# Patient Record
Sex: MALE | Race: WHITE | Employment: FULL TIME | URBAN - NONMETROPOLITAN AREA
[De-identification: names, ages, dates, MRNs, and addresses within clinical notes are randomized per-mention and may not be internally consistent; named-entity substitution may affect disease eponyms.]

---
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
## 2020-04-14 ENCOUNTER — OFFICE VISIT (OUTPATIENT)
Dept: PRIMARY CARE CLINIC | Age: 20
End: 2020-04-14
Payer: COMMERCIAL

## 2020-04-14 ENCOUNTER — HOSPITAL ENCOUNTER (OUTPATIENT)
Age: 20
Setting detail: SPECIMEN
Discharge: HOME | End: 2020-04-14
Payer: COMMERCIAL

## 2020-04-14 VITALS
HEART RATE: 93 BPM | BODY MASS INDEX: 30.54 KG/M2 | OXYGEN SATURATION: 100 % | SYSTOLIC BLOOD PRESSURE: 144 MMHG | TEMPERATURE: 98.2 F | RESPIRATION RATE: 18 BRPM | DIASTOLIC BLOOD PRESSURE: 91 MMHG | WEIGHT: 225.5 LBS | HEIGHT: 72 IN

## 2020-04-14 LAB — S PYO AG THROAT QL: NORMAL

## 2020-04-14 PROCEDURE — 87651 STREP A DNA AMP PROBE: CPT

## 2020-04-14 PROCEDURE — 99213 OFFICE O/P EST LOW 20 MIN: CPT | Performed by: NURSE PRACTITIONER

## 2020-04-14 PROCEDURE — 87880 STREP A ASSAY W/OPTIC: CPT | Performed by: NURSE PRACTITIONER

## 2020-04-14 PROCEDURE — U0002 COVID-19 LAB TEST NON-CDC: HCPCS

## 2020-04-14 NOTE — PATIENT INSTRUCTIONS
cause the common cold. They also cause more serious illnesses like [place] respiratory syndrome (MERS) and severe acute respiratory syndrome (SARS). COVID-19 is caused by a novel coronavirus. That means it's a new type that has not been seen in people before. This virus spreads person-to-person through droplets from coughing and sneezing. It can also spread when you are close to someone who is infected. And it can spread when you touch something that has the virus on it, such as a doorknob or a tabletop. What can you do to protect yourself from coronavirus (COVID-19)? The best way to protect yourself from getting sick is to:  · Avoid areas where there is an outbreak. · Avoid contact with people who may be infected. · Wash your hands often with soap or alcohol-based hand sanitizers. · Avoid crowds and try to stay at least 6 feet away from other people. · Wash your hands often, especially after you cough or sneeze. Use soap and water, and scrub for at least 20 seconds. If soap and water aren't available, use an alcohol-based hand . · Avoid touching your mouth, nose, and eyes. What can you do to avoid spreading the virus to others? To help avoid spreading the virus to others:  · Cover your mouth with a tissue when you cough or sneeze. Then throw the tissue in the trash. · Use a disinfectant to clean things that you touch often. · Stay home if you are sick or have been exposed to the virus. Don't go to school, work, or public areas. And don't use public transportation. · If you are sick:  ? Leave your home only if you need to get medical care. But call the doctor's office first so they know you're coming. And wear a face mask, if you have one.  ? If you have a face mask, wear it whenever you're around other people. It can help stop the spread of the virus when you cough or sneeze. ? Clean and disinfect your home every day. Use household  and disinfectant wipes or sprays.  Take special Instructions  Overview  The coronavirus disease (COVID-19) is caused by a virus. It causes a fever, a cough, and shortness of breath. It mainly spreads person-to-person through droplets from coughing and sneezing. The virus also can spread when people are in close contact with someone who is infected. Most people have mild symptoms and can take care of themselves at home. If their symptoms get worse, they may need care in a hospital. There is no medicine to fight the virus. It's important to not spread the virus to others. You need to isolate yourself while you are sick. Your doctor will tell you when you no longer need to be isolated. Leave your home only if you need to get medical care. Follow-up care is a key part of your treatment and safety. Be sure to make and go to all appointments, and call your doctor if you are having problems. It's also a good idea to know your test results and keep a list of the medicines you take. How can you care for yourself at home? · Get extra rest. It can help you feel better. · Drink plenty of fluids. This helps replace fluids lost from fever. Fluids also help ease a scratchy throat. Water, soup, fruit juice, and hot tea with lemon are good choices. · Take acetaminophen (such as Tylenol) to reduce a fever. It may also help with muscle aches. Read and follow all instructions on the label. · Sponge your body with lukewarm water to help with fever. Don't use cold water or ice. · Use petroleum jelly on sore skin. This can help if the skin around your nose and lips becomes sore from rubbing a lot with tissues. Tips for isolation  · Wear a face mask, if you have one, when you are around other people. It can help stop the spread of the virus when you cough or sneeze. · Limit contact with people in your home. If possible, stay in a separate bedroom and use a separate bathroom. · Avoid contact with pets and other animals.   · Cover your mouth and nose with a tissue when you

## 2020-04-14 NOTE — PROGRESS NOTES
Right cervical: Superficial cervical adenopathy present. No posterior cervical adenopathy. Left cervical: Superficial cervical adenopathy present. No posterior cervical adenopathy. Skin:     General: Skin is warm and dry. Coloration: Skin is not pale. Findings: No erythema or rash. Neurological:      Mental Status: He is alert and oriented to person, place, and time. Psychiatric:         Behavior: Behavior normal. Behavior is cooperative. Results for orders placed or performed in visit on 04/14/20   POCT rapid strep A   Result Value Ref Range    Strep A Ag None Detected None Detected   Centor Score:  +2, will send strep culture. With being symptomatic and recent exposure to possible COVID-19, will obtain COVID-19 test.      Assessment/Plan:  1. Suspected COVID-19 virus infection  - Covid-19 Ambulatory; Future    2. Sore throat  - POCT rapid strep A  - Strep A DNA probe, amplification; Future  - Covid-19 Ambulatory; Future    1. Suspected COVID 19 infection:  · Practice meticulous handwashing and cover cough to prevent spread of infection. · Advised to quarantine self at home until receives results from COVID-19 - will call with results. · Do not return to work or school until symptoms have resolved and no fever for 72 hrs without medication. · PUI (Person Under Investigation) form completed and sent to [facility]. · Get Well Loop program initiated and given Virtual Visit with ABBEY Jang on Thursday, April 16 at 11 AM.  · Encouraged to increase fluids and rest  · Tylenol OTC PRN for pain, discomfort or fever as directed on package  · Cool mist humidifier  · Hot tea with honey and lemon for cough PRN  · Patient instructions given for suspected Covid 19 infection. .  · To ER or call 911 if any increasing difficulty breathing, shortness of breath, inability to swallow, hives, rash, facial/tongue swelling or temp greater than 103 degrees.   · Follow up with

## 2020-04-15 LAB
DIRECT EXAM: NORMAL
Lab: NORMAL
SPECIMEN DESCRIPTION: NORMAL

## 2020-04-16 ENCOUNTER — TELEPHONE (OUTPATIENT)
Dept: PRIMARY CARE CLINIC | Age: 20
End: 2020-04-16

## 2020-04-16 ENCOUNTER — TELEMEDICINE (OUTPATIENT)
Dept: FAMILY MEDICINE CLINIC | Age: 20
End: 2020-04-16
Payer: COMMERCIAL

## 2020-04-16 LAB — SARS-COV-2, NAA: NOT DETECTED

## 2020-04-16 PROCEDURE — 99213 OFFICE O/P EST LOW 20 MIN: CPT | Performed by: NURSE PRACTITIONER

## 2020-04-16 ASSESSMENT — ENCOUNTER SYMPTOMS
NAUSEA: 1
VOMITING: 0
SHORTNESS OF BREATH: 0
DIARRHEA: 1
COUGH: 1

## 2020-04-16 NOTE — TELEPHONE ENCOUNTER
Phone call to patient regarding update on condition. He said he still feels about the same no worse. He did have VV with Neelam Cortés CNP today. He was instructed to report to ED if respiratory distress or change in condition.

## 2020-05-28 ENCOUNTER — OFFICE VISIT (OUTPATIENT)
Dept: FAMILY MEDICINE CLINIC | Age: 20
End: 2020-05-28
Payer: COMMERCIAL

## 2020-05-28 VITALS
HEART RATE: 110 BPM | TEMPERATURE: 98.7 F | WEIGHT: 225 LBS | DIASTOLIC BLOOD PRESSURE: 86 MMHG | BODY MASS INDEX: 30.52 KG/M2 | OXYGEN SATURATION: 99 % | SYSTOLIC BLOOD PRESSURE: 138 MMHG

## 2020-05-28 PROCEDURE — 99213 OFFICE O/P EST LOW 20 MIN: CPT | Performed by: NURSE PRACTITIONER

## 2020-05-28 ASSESSMENT — PATIENT HEALTH QUESTIONNAIRE - PHQ9
1. LITTLE INTEREST OR PLEASURE IN DOING THINGS: 0
SUM OF ALL RESPONSES TO PHQ QUESTIONS 1-9: 0
SUM OF ALL RESPONSES TO PHQ9 QUESTIONS 1 & 2: 0
SUM OF ALL RESPONSES TO PHQ QUESTIONS 1-9: 0
2. FEELING DOWN, DEPRESSED OR HOPELESS: 0

## 2020-05-28 ASSESSMENT — ENCOUNTER SYMPTOMS
NAUSEA: 0
DIARRHEA: 0
VOMITING: 0
SHORTNESS OF BREATH: 0
COUGH: 0

## 2020-05-28 NOTE — PROGRESS NOTES
HPI Notes    Name: Nirmala Montelongo  : 2000         Chief Complaint:     Chief Complaint   Patient presents with    Testicle Pain     Patient complains of right testicle pain, not sure if he feels a lump. Started a couple months ago. History of Present Illness:        Testicle Pain   This is a new problem. The current episode started more than 1 month ago (2 months ago). The problem occurs intermittently. The problem has been waxing and waning. Pertinent negatives include no chest pain, chills, coughing, fever, headaches, nausea or vomiting. Associated symptoms comments: Tenderness in right testicle only with palpation. Nothing aggravates the symptoms. He has tried nothing for the symptoms. Past Medical History:     No past medical history on file. Reviewed all health maintenance requirements and ordered appropriate tests  Health Maintenance Due   Topic Date Due    HPV vaccine (1 - Male 2-dose series) 2011    HIV screen  2015       Past Surgical History:     Past Surgical History:   Procedure Laterality Date    TONSILLECTOMY          Medications:       Prior to Admission medications    Not on File        Allergies:       Patient has no known allergies. Social History:     Tobacco:    reports that he has never smoked. He has never used smokeless tobacco.  Alcohol:      reports no history of alcohol use. Drug Use:  reports no history of drug use. Family History:      No family history on file. Review of Systems:         Review of Systems   Constitutional: Negative for chills and fever. Respiratory: Negative for cough and shortness of breath. Cardiovascular: Negative for chest pain and palpitations. Gastrointestinal: Negative for diarrhea, nausea and vomiting. Genitourinary: Positive for testicular pain (with palpation). Negative for discharge, dysuria, flank pain, frequency, genital sores, penile pain, penile swelling, scrotal swelling and urgency. Neurological: Negative for dizziness, seizures and headaches. Physical Exam:     Vitals:  /86   Pulse 110   Temp 98.7 °F (37.1 °C) (Oral)   Wt 225 lb (102.1 kg)   SpO2 99%   BMI 30.52 kg/m²       Physical Exam  Vitals signs and nursing note reviewed. Constitutional:       Appearance: He is well-developed. Cardiovascular:      Rate and Rhythm: Normal rate and regular rhythm. Heart sounds: S1 normal and S2 normal.   Pulmonary:      Effort: Pulmonary effort is normal. No respiratory distress. Breath sounds: Normal breath sounds. Abdominal:      General: Bowel sounds are normal.      Palpations: Abdomen is soft. Tenderness: There is no abdominal tenderness. Genitourinary:     Penis: Normal and circumcised. Scrotum/Testes:         Right: Tenderness present. Mass, swelling or testicular hydrocele not present. Epididymis:      Right: Tenderness present. Comments: Mild tenderness to the epididymal araya on right. Neg phren's sign. Skin:     General: Skin is warm and dry. Psychiatric:         Behavior: Behavior is cooperative.                Data:     Lab Results   Component Value Date     04/17/2017    K 4.0 04/17/2017     04/17/2017    CO2 26 04/17/2017    BUN 10 04/17/2017    CREATININE 1.05 04/17/2017    GLUCOSE 100 04/17/2017    GLUCOSE 93 11/25/2011    PROT 7.8 04/17/2017    LABALBU 4.5 04/17/2017    BILITOT 0.37 04/17/2017    ALKPHOS 202 04/17/2017    AST 23 04/17/2017    ALT 22 04/17/2017     Lab Results   Component Value Date    WBC 6.7 04/17/2017    RBC 5.24 04/17/2017    RBC 4.52 11/25/2011    HGB 15.0 04/17/2017    HCT 44.3 04/17/2017    MCV 84.5 04/17/2017    MCH 28.5 04/17/2017    MCHC 33.8 04/17/2017    RDW 13.1 04/17/2017     04/17/2017     11/25/2011    MPV NOT REPORTED 04/17/2017     Lab Results   Component Value Date    TSH 1.66 04/17/2017     No results found for: CHOL, HDL, PSA, LABA1C       Assessment & Plan

## 2021-03-25 ENCOUNTER — OFFICE VISIT (OUTPATIENT)
Dept: FAMILY MEDICINE CLINIC | Age: 21
End: 2021-03-25
Payer: COMMERCIAL

## 2021-03-25 VITALS
OXYGEN SATURATION: 99 % | BODY MASS INDEX: 32.69 KG/M2 | SYSTOLIC BLOOD PRESSURE: 130 MMHG | HEART RATE: 84 BPM | DIASTOLIC BLOOD PRESSURE: 80 MMHG | WEIGHT: 241 LBS | TEMPERATURE: 97.6 F

## 2021-03-25 DIAGNOSIS — F41.9 ANXIETY: ICD-10-CM

## 2021-03-25 DIAGNOSIS — R45.89 DEPRESSED MOOD: Primary | ICD-10-CM

## 2021-03-25 DIAGNOSIS — F33.1 MODERATE EPISODE OF RECURRENT MAJOR DEPRESSIVE DISORDER (HCC): ICD-10-CM

## 2021-03-25 PROCEDURE — 99214 OFFICE O/P EST MOD 30 MIN: CPT | Performed by: STUDENT IN AN ORGANIZED HEALTH CARE EDUCATION/TRAINING PROGRAM

## 2021-03-25 RX ORDER — FLUOXETINE HYDROCHLORIDE 20 MG/1
CAPSULE ORAL
Qty: 30 CAPSULE | Refills: 0 | Status: SHIPPED | OUTPATIENT
Start: 2021-03-25 | End: 2021-11-16 | Stop reason: SDUPTHER

## 2021-03-25 ASSESSMENT — PATIENT HEALTH QUESTIONNAIRE - PHQ9
SUM OF ALL RESPONSES TO PHQ QUESTIONS 1-9: 17
7. TROUBLE CONCENTRATING ON THINGS, SUCH AS READING THE NEWSPAPER OR WATCHING TELEVISION: 1
6. FEELING BAD ABOUT YOURSELF - OR THAT YOU ARE A FAILURE OR HAVE LET YOURSELF OR YOUR FAMILY DOWN: 1
SUM OF ALL RESPONSES TO PHQ9 QUESTIONS 1 & 2: 5
4. FEELING TIRED OR HAVING LITTLE ENERGY: 2
3. TROUBLE FALLING OR STAYING ASLEEP: 2
10. IF YOU CHECKED OFF ANY PROBLEMS, HOW DIFFICULT HAVE THESE PROBLEMS MADE IT FOR YOU TO DO YOUR WORK, TAKE CARE OF THINGS AT HOME, OR GET ALONG WITH OTHER PEOPLE: 3
1. LITTLE INTEREST OR PLEASURE IN DOING THINGS: 3
9. THOUGHTS THAT YOU WOULD BE BETTER OFF DEAD, OR OF HURTING YOURSELF: 1

## 2021-03-25 ASSESSMENT — COLUMBIA-SUICIDE SEVERITY RATING SCALE - C-SSRS
2. HAVE YOU ACTUALLY HAD ANY THOUGHTS OF KILLING YOURSELF?: NO
1. WITHIN THE PAST MONTH, HAVE YOU WISHED YOU WERE DEAD OR WISHED YOU COULD GO TO SLEEP AND NOT WAKE UP?: YES
6. HAVE YOU EVER DONE ANYTHING, STARTED TO DO ANYTHING, OR PREPARED TO DO ANYTHING TO END YOUR LIFE?: NO

## 2021-03-25 ASSESSMENT — ENCOUNTER SYMPTOMS
ABDOMINAL PAIN: 0
DIARRHEA: 0
WHEEZING: 0
SINUS PAIN: 0
VOMITING: 0
NAUSEA: 0
SORE THROAT: 0
COUGH: 0

## 2021-03-25 NOTE — PROGRESS NOTES
HPI Notes    Name: Catherine [place]  : 2000         Chief Complaint:     Chief Complaint   Patient presents with   [address]     Patient is here for Anxiety and Depression. States the Anxiety has been going on for about a year and Depression has been going on for awhile off and on. History of Present Illness:      HPI    Is a 25-year-old man with no known past medical history presenting to discuss mental health question with me. States he would like to discuss his depressed mood, as well as recent anxiety. He states that his anxiety has been ongoing for over 12 months, and his mood has been depressed \"on and off\" for at least that long, but over the past three months in particular. He is unable to recall any inciting event, but does have a history of familial discord including two divorces in his parents. He states he is able to concentrate at work and complete necessary tasks, but states that outside of work he is noticing several problems with function, endorsing low appetite, decreased sleep d/t difficulty, self isolation, anhedonia. He has been experiencing psychomotor retardation as well, and has had passive suicidal ideation without a plan. He has been smoking cannabis, 2 bongs/day over the past few months as depressed mood has worsened, was less frequent before that. He has never been treated for MDD before. Past Medical History:     No past medical history on file. Reviewed all health maintenance requirements and ordered appropriate tests  Health Maintenance Due   Topic Date Due    Hepatitis C screen  Never done    HPV vaccine (1 - Male 2-dose series) Never done    HIV screen  Never done    COVID-19 Vaccine (1) Never done       Past Surgical History:     Past Surgical History:   Procedure Laterality Date    TONSILLECTOMY          Medications:       Prior to Admission medications    Not on File        Allergies:       Patient has no known allergies.     Social History: Tobacco:    reports that he has never smoked. He has never used smokeless tobacco.  Alcohol:      reports no history of alcohol use. Drug Use:  reports no history of drug use. Family History:     No family history on file. Review of Systems:         Review of Systems   Constitutional: Negative for fever. HENT: Negative for sinus pain, sneezing and sore throat. Respiratory: Negative for cough and wheezing. Cardiovascular: Negative for chest pain. Gastrointestinal: Negative for abdominal pain, diarrhea, nausea and vomiting. Psychiatric/Behavioral: Positive for behavioral problems, decreased concentration, dysphoric mood and suicidal ideas. Negative for agitation, hallucinations, self-injury and sleep disturbance. The patient is nervous/anxious. The patient is not hyperactive. Physical Exam:     Vitals: Wt 241 lb (109.3 kg)   BMI 32.69 kg/m²       Physical Exam  Vitals signs and nursing note reviewed. Constitutional:       General: He is not in acute distress. Appearance: Normal appearance. He is normal weight. Musculoskeletal: Normal range of motion. General: No swelling or tenderness. Skin:     General: Skin is warm and dry. Capillary Refill: Capillary refill takes less than 2 seconds. Neurological:      General: No focal deficit present. Mental Status: He is alert and oriented to person, place, and time. Mental status is at baseline. Psychiatric:         Attention and Perception: Attention normal.         Mood and Affect: Mood is depressed. Speech: Speech normal.         Behavior: Behavior normal. Behavior is cooperative. Thought Content: Thought content normal.         Cognition and Memory: Cognition and memory normal.      Comments: Good insight. Consistently demonstrating future oriented thought.                  Data:     Lab Results   Component Value Date     04/17/2017    K 4.0 04/17/2017     04/17/2017    CO2 26 04/17/2017    BUN 10 04/17/2017    CREATININE 1.05 04/17/2017    GLUCOSE 100 04/17/2017    GLUCOSE 93 11/25/2011    PROT 7.8 04/17/2017    LABALBU 4.5 04/17/2017    BILITOT 0.37 04/17/2017    ALKPHOS 202 04/17/2017    AST 23 04/17/2017    ALT 22 04/17/2017     Lab Results   Component Value Date    WBC 6.7 04/17/2017    RBC 5.24 04/17/2017    RBC 4.52 11/25/2011    HGB 15.0 04/17/2017    HCT 44.3 04/17/2017    MCV 84.5 04/17/2017    MCH 28.5 04/17/2017    MCHC 33.8 04/17/2017    RDW 13.1 04/17/2017     04/17/2017     11/25/2011    MPV NOT REPORTED 04/17/2017     Lab Results   Component Value Date    TSH 1.66 04/17/2017     No results found for: CHOL, HDL, PSA, LABA1C       Assessment & Plan        Diagnosis Orders   1. Depressed mood     2. Anxiety         1. Based on history, interview he certainly does meet DSM-V criteria for major depressive disorder. He is also experiencing panic attacks and emotional dysregulation. I would recommend treatment for 6 to 12 months with fluoxetine. The patient I had a long discussion about starting fluoxetine. We discussed its mechanism of action, intended goals, adverse effects, as well as common side effects. They were able to verbalize understanding, and repeat plan back to me. I would also recommend he undergo counseling, and cognitive behavioral therapy specifically. I have referred him to family life counseling in [facility]. He states he is not particularly [employer]an, thus I see no need to recommend involving his mary group in treatment. Follow-up in 2 weeks            Completed Refills   Requested Prescriptions      No prescriptions requested or ordered in this encounter     No follow-ups on file. No orders of the defined types were placed in this encounter. No orders of the defined types were placed in this encounter. Patient Instructions     SURVEY:    You may be receiving a survey from [employer] regarding your visit today.     Please

## 2021-03-25 NOTE — PATIENT INSTRUCTIONS
Chance,   I believe that you have Major depression. I'd recommend treatment with counseling (cognitive behavioral therapy) and generic Prozac (fluoxetine). Take 1 prozac capsule daily for 2 weeks, then go to 2 capsules daily after that. Come back and see me in 2 weeks. Total treatment time is usually 6-12 months. You've shown a lot of fortitude coming here and you should be proud. Dr. Dea Auguste:    Alexyserwinmurray Arboleda may be receiving a survey from [employer] regarding your visit today. Please complete the survey to enable us to provide the highest quality of care to you and your family. If you cannot score us a very good on any question, please call the office to discuss how we could of made your experience a very good one. Thank you. Patient Education        Recovering From Depression: Care Instructions  Your Care Instructions     Taking good care of yourself is important as you recover from depression. In time, your symptoms will fade as your treatment takes hold. Do not give up. Instead, focus your energy on getting better. Your mood will improve. It just takes some time. Focus on things that can help you feel better, such as being with friends and family, eating well, and getting enough rest. But take things slowly. Do not do too much too soon. You will begin to feel better gradually. Follow-up care is a key part of your treatment and safety. Be sure to make and go to all appointments, and call your doctor if you are having problems. It's also a good idea to know your test results and keep a list of the medicines you take. How can you care for yourself at home? Be realistic  · If you have a large task to do, break it up into smaller steps you can handle, and just do what you can. · You may want to put off important decisions until your depression has lifted. If you have plans that will have a major impact on your life, such as marriage, divorce, or a job change, try to wait a bit.  Talk it over with effects from your medicine, tell your doctor. Many side effects are mild and will go away on their own after you have been taking the medicine for a few weeks. Some may last longer. Talk to your doctor if side effects are bothering you too much. You might be able to try a different medicine. · Continue counseling. It may help prevent depression from returning, especially if you've had multiple episodes of depression. Talk with your counselor if you are having a hard time attending your sessions or you think the sessions aren't working. Don't just stop going. · Get enough sleep. Talk to your doctor if you are having problems sleeping. · Avoid sleeping pills unless they are prescribed by the doctor treating your depression. Sleeping pills may make you groggy during the day, and they may interact with other medicine you are taking. · If you have any other illnesses, such as diabetes, heart disease, or high blood pressure, make sure to continue with your treatment. Tell your doctor about all of the medicines you take, including those with or without a prescription. · If you or someone you know talks about suicide, self-harm, or feeling hopeless, get help right away. Call the [address] [place] [address] at 1-800-273-talk (1-112.144.9288) or text HOME to 218833 to access the Crisis Text Line. Consider saving these numbers in your phone. When should you call for help? Call 455 anytime you think you may need emergency care. For example, call if:    · You feel like hurting yourself or someone else.     · Someone you know has depression and is about to attempt or is attempting suicide. Call your doctor now or seek immediate medical care if:    · You hear voices.     · Someone you know has depression and:  ? Starts to give away his or her possessions. ? Uses illegal drugs or drinks alcohol heavily. ? Talks or writes about death, including writing suicide notes or talking about guns, knives, or pills.   ? Starts to spend a lot of time alone. ? Acts very aggressively or suddenly appears calm. Watch closely for changes in your health, and be sure to contact your doctor if:    · You do not get better as expected. Where can you learn more? Go to https://chloliseb.PedidosYa / PedidosJÃ¡. org and sign in to your [employer] account. Enter V223 in the Kybernesis box to learn more about \"Recovering From Depression: Care Instructions. \"     If you do not have an account, please click on the \"Sign Up Now\" link. Current as of: September 23, 2020               Content Version: 12.8  © 8742-7458 Healthwise, Incorporated. Care instructions adapted under license by [facility] ([facility]). If you have questions about a medical condition or this instruction, always ask your healthcare professional. [address] any warranty or liability for your use of this information.

## 2021-04-08 ENCOUNTER — OFFICE VISIT (OUTPATIENT)
Dept: FAMILY MEDICINE CLINIC | Age: 21
End: 2021-04-08
Payer: COMMERCIAL

## 2021-04-08 VITALS
OXYGEN SATURATION: 99 % | SYSTOLIC BLOOD PRESSURE: 138 MMHG | TEMPERATURE: 97.2 F | DIASTOLIC BLOOD PRESSURE: 88 MMHG | BODY MASS INDEX: 31.87 KG/M2 | HEART RATE: 104 BPM | WEIGHT: 235 LBS

## 2021-04-08 DIAGNOSIS — G43.109 MIGRAINE WITH AURA AND WITHOUT STATUS MIGRAINOSUS, NOT INTRACTABLE: ICD-10-CM

## 2021-04-08 DIAGNOSIS — F32.1 CURRENT MODERATE EPISODE OF MAJOR DEPRESSIVE DISORDER WITHOUT PRIOR EPISODE (HCC): Primary | ICD-10-CM

## 2021-04-08 PROBLEM — F32.9 CURRENT EPISODE OF MAJOR DEPRESSIVE DISORDER WITHOUT PRIOR EPISODE: Status: ACTIVE | Noted: 2021-04-08

## 2021-04-08 PROCEDURE — 99214 OFFICE O/P EST MOD 30 MIN: CPT | Performed by: STUDENT IN AN ORGANIZED HEALTH CARE EDUCATION/TRAINING PROGRAM

## 2021-04-08 RX ORDER — NAPROXEN SODIUM 550 MG/1
TABLET ORAL
Qty: 60 TABLET | Refills: 3 | Status: SHIPPED | OUTPATIENT
Start: 2021-04-08 | End: 2021-09-10

## 2021-04-08 RX ORDER — FLUOXETINE HYDROCHLORIDE 40 MG/1
40 CAPSULE ORAL DAILY
Qty: 90 CAPSULE | Refills: 1 | Status: SHIPPED | OUTPATIENT
Start: 2021-04-08 | End: 2021-05-20 | Stop reason: SDUPTHER

## 2021-04-08 ASSESSMENT — ENCOUNTER SYMPTOMS
SINUS PAIN: 0
SORE THROAT: 0
COUGH: 0
VOMITING: 0
BACK PAIN: 0
DIARRHEA: 0
WHEEZING: 0
ABDOMINAL PAIN: 0
NAUSEA: 0
PHOTOPHOBIA: 1

## 2021-04-08 NOTE — PATIENT INSTRUCTIONS
on low to relax tight shoulder and neck muscles. · Have someone gently massage your neck and shoulders. · Take your medicines exactly as prescribed. Call your doctor if you think you are having a problem with your medicine. You will get more details on the specific medicines your doctor prescribes. · Don't take medicine for headache pain too often. Talk to your doctor if you are taking medicine more than 2 days a week to stop a headache. Taking too much pain medicine can lead to more headaches. These are called medicine-overuse headaches. To prevent migraines  · Keep a headache diary so you can figure out what triggers your headaches. Avoiding triggers may help you prevent headaches. Record when each headache began, how long it lasted, and what the pain was like. Write down any other symptoms you had with the headache, such as nausea, flashing lights or dark spots, or sensitivity to bright light or loud noise. Note if the headache occurred near your period. List anything that might have triggered the headache. Triggers may include certain foods (chocolate, cheese, wine) or odors, smoke, bright light, stress, or lack of sleep. · If your doctor has prescribed medicine for your migraines, take it as directed. You may have medicine that you take only when you get a migraine and medicine that you take all the time to help prevent migraines. ? If your doctor has prescribed medicine for when you get a headache, take it at the first sign of a migraine, unless your doctor has given you other instructions. ? If your doctor has prescribed medicine to prevent migraines, take it exactly as prescribed. Call your doctor if you think you are having a problem with your medicine. · Find healthy ways to deal with stress. Migraines are most common during or right after stressful times. Try finding ways to reduce stress like practicing mindfulness or deep breathing exercises. · Get plenty of sleep and exercise.  But be careful to not push yourself too hard during exercise. It may trigger a headache. · Eat meals on a regular schedule. Avoid foods and drinks that often trigger migraines. These include chocolate, alcohol (especially red wine and port), aspartame, monosodium glutamate (MSG), and some additives found in foods (such as hot dogs, aguiar, cold cuts, aged cheeses, and pickled foods). · Limit caffeine. Don't drink too much coffee, tea, or soda. But don't quit caffeine suddenly. That can also give you migraines. · Do not smoke or allow others to smoke around you. If you need help quitting, talk to your doctor about stop-smoking programs and medicines. These can increase your chances of quitting for good. · If you are taking birth control pills or hormone therapy, talk to your doctor about whether they are triggering your migraines. When should you call for help? Call 911 anytime you think you may need emergency care. For example, call if:    · You have signs of a stroke. These may include:  ? Sudden numbness, paralysis, or weakness in your face, arm, or leg, especially on only one side of your body. ? Sudden vision changes. ? Sudden trouble speaking. ? Sudden confusion or trouble understanding simple statements. ? Sudden problems with walking or balance. ? A sudden, severe headache that is different from past headaches. Call your doctor now or seek immediate medical care if:    · You have new or worse nausea and vomiting.     · You have a new or higher fever.     · Your headache gets much worse. Watch closely for changes in your health, and be sure to contact your doctor if:    · You are not getting better after 2 days (48 hours). Where can you learn more? Go to https://Jeds Barbeque and Brewlucina.Bespoke. org and sign in to your [employer] account. Enter Z888 in the Meituan.com box to learn more about \"Migraine Headache: Care Instructions. \"     If you do not have an account, please click on the \"Sign Up Now\" link.  Current as of: August 4, 2020               Content Version: 12.8  © 3038-0013 [employer], Incorporated. Care instructions adapted under license by [facility] ([facility]). If you have questions about a medical condition or this instruction, always ask your healthcare professional. [address] any warranty or liability for your use of this information.

## 2021-04-08 NOTE — PROGRESS NOTES
HPI Notes    Name: Lamont Lima  : 2000         Chief Complaint:     Chief Complaint   Patient presents with   [address]     Patient is here for follow up for depression. Started on Prozac and  hasn't seen a change.  Dizziness     Started about a month ago with lightheadedness and blurred vision.  Blurred Vision       History of Present Illness:        HPI    Major Depressive disorder: has been \"forcing himself to go to the gymn, spend time with family\". No change with passive suicidality, mood still lower, sleep is same as is appetite. Has not noticed any bothersome side effects from the Prozac. Has contacted counseling, looking to get set with an appointment. Endorses occasional lightheadedness with blurry vision, endorses pounding unihemispheric headache centered behind right eye with photo/phonophobia. No nausea. Does have blurry vision in context of headache. Dizziness has increased with frequency of headaches. Is reporting about 3 headaches per week. Past Medical History:     No past medical history on file. Reviewed all health maintenance requirements and ordered appropriate tests  Health Maintenance Due   Topic Date Due    Hepatitis C screen  Never done    HPV vaccine (1 - Male 2-dose series) Never done    HIV screen  Never done    COVID-19 Vaccine (1) Never done       Past Surgical History:     Past Surgical History:   Procedure Laterality Date    TONSILLECTOMY          Medications:       Prior to Admission medications    Medication Sig Start Date End Date Taking? Authorizing Provider   naproxen sodium (ANAPROX) 550 MG tablet Take 1100mg PO at onset of headache, may take 550mg one hour later if headache persists 21  Yes Arnie Claire DO   FLUoxetine (PROZAC) 40 MG capsule Take 1 capsule by mouth daily 21  Yes Arnie Claire DO        Allergies:       Patient has no known allergies.     Social History:     Tobacco:    reports that he has never smoked. He has never used smokeless tobacco.  Alcohol:      reports no history of alcohol use. Drug Use:  reports no history of drug use. Family History:     No family history on file. Review of Systems:         Review of Systems   Constitutional: Negative for fever. HENT: Negative for sinus pain, sneezing and sore throat. Eyes: Positive for photophobia and visual disturbance. Respiratory: Negative for cough and wheezing. Cardiovascular: Negative for chest pain. Gastrointestinal: Negative for abdominal pain, diarrhea, nausea and vomiting. Musculoskeletal: Negative for back pain. Skin: Negative for rash. Neurological: Positive for dizziness, light-headedness and headaches. Negative for facial asymmetry, weakness and numbness. Hematological: Negative for adenopathy. Psychiatric/Behavioral: Positive for sleep disturbance and suicidal ideas. Negative for decreased concentration. Depressed mood           Physical Exam:     Vitals:  /88   Pulse 104   Temp 97.2 °F (36.2 °C) (Temporal)   Wt 235 lb (106.6 kg)   SpO2 99%   BMI 31.87 kg/m²       Physical Exam  Vitals signs and nursing note reviewed. Constitutional:       General: He is not in acute distress. Appearance: Normal appearance. He is normal weight. Cardiovascular:      Rate and Rhythm: Normal rate and regular rhythm. Pulses: Normal pulses. Heart sounds: Normal heart sounds. No murmur. Pulmonary:      Effort: Pulmonary effort is normal. No respiratory distress. Breath sounds: Normal breath sounds. Musculoskeletal: Normal range of motion. General: No swelling or tenderness. Skin:     General: Skin is warm and dry. Capillary Refill: Capillary refill takes less than 2 seconds. Neurological:      General: No focal deficit present. Mental Status: He is alert and oriented to person, place, and time. Mental status is at baseline.    Psychiatric:         Mood and Affect: Mood normal.         Behavior: Behavior normal.         Thought Content: Thought content normal.                 Data:     Lab Results   Component Value Date     04/17/2017    K 4.0 04/17/2017     04/17/2017    CO2 26 04/17/2017    BUN 10 04/17/2017    CREATININE 1.05 04/17/2017    GLUCOSE 100 04/17/2017    GLUCOSE 93 11/25/2011    PROT 7.8 04/17/2017    LABALBU 4.5 04/17/2017    BILITOT 0.37 04/17/2017    ALKPHOS 202 04/17/2017    AST 23 04/17/2017    ALT 22 04/17/2017     Lab Results   Component Value Date    WBC 6.7 04/17/2017    RBC 5.24 04/17/2017    RBC 4.52 11/25/2011    HGB 15.0 04/17/2017    HCT 44.3 04/17/2017    MCV 84.5 04/17/2017    MCH 28.5 04/17/2017    MCHC 33.8 04/17/2017    RDW 13.1 04/17/2017     04/17/2017     11/25/2011    MPV NOT REPORTED 04/17/2017     Lab Results   Component Value Date    TSH 1.66 04/17/2017     No results found for: CHOL, HDL, PSA, LABA1C       Assessment & Plan        Diagnosis Orders   1. Current moderate episode of major depressive disorder without prior episode (HCC)  FLUoxetine (PROZAC) 40 MG capsule   2. Migraine with aura and without status migrainosus, not intractable  naproxen sodium (ANAPROX) 550 MG tablet       1. Increase to 40mg fluoxetine PO qD. Reevaluate in 6 weeks  2. Will use abortive therapy with naproxen 550mg. 1100mg PO at onset of headache followed by 550mg one hour later if needed. Completed Refills   Requested Prescriptions     Signed Prescriptions Disp Refills    naproxen sodium (ANAPROX) 550 MG tablet 60 tablet 3     Sig: Take 1100mg PO at onset of headache, may take 550mg one hour later if headache persists    FLUoxetine (PROZAC) 40 MG capsule 90 capsule 1     Sig: Take 1 capsule by mouth daily     Return in about 6 weeks (around 5/20/2021) for Mental Health, MDD.   Orders Placed This Encounter   Medications    naproxen sodium (ANAPROX) 550 MG tablet     Sig: Take 1100mg PO at onset of headache, may take 550mg one hour later if headache persists     Dispense:  60 tablet     Refill:  3    FLUoxetine (PROZAC) 40 MG capsule     Sig: Take 1 capsule by mouth daily     Dispense:  90 capsule     Refill:  1     No orders of the defined types were placed in this encounter. Patient Instructions   Chance,  Start taking two 20mg Prozac capsules/day for total of 40mg once per day. We'll reevaluate you in 6 weeks for this problem. Please keep your appointment with counseling. Based on your history, you seem to have migraine headaches. Call me if you get more than 3 per week. We need to use abortive therapy with naproxen. Take 1100mg naproxen once with a coffee or tea then you may take an additional 550mg 1 hour later if the headache is still there. Dr. Tia Castro:    Sherly Islas may be receiving a survey from "[employer] regarding your visit today. Please complete the survey to enable us to provide the highest quality of care to you and your family. If you cannot score us a very good on any question, please call the office to discuss how we could of made your experience a very good one. Thank you. Electronically signed by Prosper Breen DO on 4/8/2021 at 9:31 AM           Completed Refills   Requested Prescriptions     Signed Prescriptions Disp Refills    naproxen sodium (ANAPROX) 550 MG tablet 60 tablet 3     Sig: Take 1100mg PO at onset of headache, may take 550mg one hour later if headache persists    FLUoxetine (PROZAC) 40 MG capsule 90 capsule 1     Sig: Take 1 capsule by mouth daily         Chance received counseling on the following healthy behaviors: medication adherence  Reviewed prior labs and health maintenance. Continue current medications, diet and exercise. Discussed use, benefit, and side effects of prescribed medications. Barriers to medication compliance addressed. Patient given educational materials - see patient instructions. All patient questions answered.   Patient voiced

## 2021-05-20 ENCOUNTER — OFFICE VISIT (OUTPATIENT)
Dept: FAMILY MEDICINE CLINIC | Age: 21
End: 2021-05-20
Payer: COMMERCIAL

## 2021-05-20 VITALS
OXYGEN SATURATION: 100 % | HEIGHT: 72 IN | WEIGHT: 229 LBS | BODY MASS INDEX: 31.02 KG/M2 | HEART RATE: 64 BPM | DIASTOLIC BLOOD PRESSURE: 80 MMHG | TEMPERATURE: 97 F | SYSTOLIC BLOOD PRESSURE: 120 MMHG

## 2021-05-20 DIAGNOSIS — F41.9 ANXIETY: ICD-10-CM

## 2021-05-20 DIAGNOSIS — F51.02 ADJUSTMENT INSOMNIA: ICD-10-CM

## 2021-05-20 DIAGNOSIS — F32.1 CURRENT MODERATE EPISODE OF MAJOR DEPRESSIVE DISORDER WITHOUT PRIOR EPISODE (HCC): Primary | ICD-10-CM

## 2021-05-20 DIAGNOSIS — Z76.0 MEDICATION REFILL: ICD-10-CM

## 2021-05-20 PROCEDURE — 99214 OFFICE O/P EST MOD 30 MIN: CPT | Performed by: STUDENT IN AN ORGANIZED HEALTH CARE EDUCATION/TRAINING PROGRAM

## 2021-05-20 RX ORDER — FLUOXETINE HYDROCHLORIDE 40 MG/1
40 CAPSULE ORAL DAILY
Qty: 90 CAPSULE | Refills: 1 | Status: SHIPPED | OUTPATIENT
Start: 2021-05-20 | End: 2021-06-14 | Stop reason: SDUPTHER

## 2021-05-20 RX ORDER — HYDROXYZINE HYDROCHLORIDE 25 MG/1
25 TABLET, FILM COATED ORAL 4 TIMES DAILY PRN
Qty: 120 TABLET | Refills: 5 | Status: SHIPPED | OUTPATIENT
Start: 2021-05-20 | End: 2021-05-30

## 2021-05-20 SDOH — ECONOMIC STABILITY: FOOD INSECURITY: WITHIN THE PAST 12 MONTHS, YOU WORRIED THAT YOUR FOOD WOULD RUN OUT BEFORE YOU GOT MONEY TO BUY MORE.: NEVER TRUE

## 2021-05-20 ASSESSMENT — ENCOUNTER SYMPTOMS
VOMITING: 0
WHEEZING: 0
NAUSEA: 0
BACK PAIN: 0
SORE THROAT: 0
ABDOMINAL PAIN: 0
SINUS PAIN: 0
COUGH: 0
DIARRHEA: 0

## 2021-05-20 NOTE — PATIENT INSTRUCTIONS
Chance,  Use hydroxyzine one tablet up to four times a day as needed for symptoms of anxiety. For using it as a sleep aid, take two tablets one hour before bedtime. Dr. Almanzar Memos:    Tressa Sanders may be receiving a survey from [employer] regarding your visit today. Please complete the survey to enable us to provide the highest quality of care to you and your family. If you cannot score us a very good on any question, please call the office to discuss how we could of made your experience a very good one. Thank you.       Clinical Care Team:     Dr. Nitin Hunt, [facility]      ClericalTeam:     Florinda Poe     [address]

## 2021-05-20 NOTE — PROGRESS NOTES
HPI Notes    Name: Jorge Cam  : 2000         Chief Complaint:     Chief Complaint   Patient presents with   [address] Problem     Patient is here for follow up on Depression. he is currently taking Prozac 40mg and doing good.  Medication Refill     Needs prozac refilled. History of Present Illness:        HPI    This is a pleasant 26-year-old man presenting for interval follow-up for treatment of major depression. He is currently treated with Prozac 40 mg p.o. daily. He reports he is \"doing well\". He is reporting improved functionality, has less \"in bed days\" and is going to the gym more, reports increased ease of being social, interacting with the public and family. He is reporting improved relationships with his immediate family, and less agitation as well. He is still reporting insomnia, both difficulty falling asleep and difficulty staying asleep. He is still reporting difficulty with appetite and ordinarily eats once per day. He feels his depression has improved but he is still having \"anxiety symptoms\", particularly cold sweats, frequent need to defecate. He frequently feels a headache behind his right eye when this happens. This does not happen during his work, as \"I am the boss, I know I am the boss, and my anxiety has no place in that situation\". He reports he is more symptomatic during social events, and during his recreational time. Past Medical History:     No past medical history on file. Reviewed all health maintenance requirements and ordered appropriate tests  Health Maintenance Due   Topic Date Due    Hepatitis C screen  Never done    HPV vaccine (1 - Male 2-dose series) Never done    COVID-19 Vaccine (1) Never done    HIV screen  Never done       Past Surgical History:     Past Surgical History:   Procedure Laterality Date    TONSILLECTOMY          Medications:       Prior to Admission medications    Medication Sig Start Date End Date Taking?  Authorizing Provider   FLUoxetine (PROZAC) 40 MG capsule Take 1 capsule by mouth daily 5/20/21  Yes Leeanna Raw, DO   hydrOXYzine (ATARAX) 25 MG tablet Take 1 tablet by mouth 4 times daily as needed for Anxiety 5/20/21 5/30/21 Yes Leeanna Raw, DO   naproxen sodium (ANAPROX) 550 MG tablet Take 1100mg PO at onset of headache, may take 550mg one hour later if headache persists 4/8/21  Yes Leeanna Raw, DO        Allergies:       Patient has no known allergies. Social History:     Tobacco:    reports that he has never smoked. He has never used smokeless tobacco.  Alcohol:      reports no history of alcohol use. Drug Use:  reports no history of drug use. Family History:     No family history on file. Review of Systems:         Review of Systems   Constitutional: Positive for appetite change. Negative for fever. HENT: Negative for sinus pain, sneezing and sore throat. Respiratory: Negative for cough and wheezing. Cardiovascular: Negative for chest pain. Gastrointestinal: Negative for abdominal pain, diarrhea, nausea and vomiting. Musculoskeletal: Negative for back pain. Skin: Negative for rash. Hematological: Negative for adenopathy. Psychiatric/Behavioral: Positive for sleep disturbance. Negative for agitation, behavioral problems, dysphoric mood, hallucinations and suicidal ideas. The patient is nervous/anxious. Physical Exam:     Vitals:  /80   Pulse 64   Temp 97 °F (36.1 °C) (Temporal)   Ht 6' (1.829 m)   Wt 229 lb (103.9 kg)   SpO2 100%   BMI 31.06 kg/m²       Physical Exam  Vitals and nursing note reviewed. Constitutional:       General: He is not in acute distress. Appearance: Normal appearance. Musculoskeletal:         General: No swelling or tenderness. Normal range of motion. Skin:     General: Skin is warm and dry. Capillary Refill: Capillary refill takes less than 2 seconds. Neurological:      General: No focal deficit present. mechanism of action, intended goals, adverse effects, as well as common side effects. They were able to verbalize understanding, and repeat plan back to me. Overall, treatment is progressing well, he is noticing improvement in personal and social functioning. Would recommend he be reevaluated in office in 3 months            Completed Refills   Requested Prescriptions     Signed Prescriptions Disp Refills    FLUoxetine (PROZAC) 40 MG capsule 90 capsule 1     Sig: Take 1 capsule by mouth daily    hydrOXYzine (ATARAX) 25 MG tablet 120 tablet 5     Sig: Take 1 tablet by mouth 4 times daily as needed for Anxiety     Return in about 3 months (around 8/20/2021) for Mental Health--MDD, insomnia. Orders Placed This Encounter   Medications    FLUoxetine (PROZAC) 40 MG capsule     Sig: Take 1 capsule by mouth daily     Dispense:  90 capsule     Refill:  1    hydrOXYzine (ATARAX) 25 MG tablet     Sig: Take 1 tablet by mouth 4 times daily as needed for Anxiety     Dispense:  120 tablet     Refill:  5     No orders of the defined types were placed in this encounter. Patient Instructions   Chance,  Use hydroxyzine one tablet up to four times a day as needed for symptoms of anxiety. For using it as a sleep aid, take two tablets one hour before bedtime. Dr. Oksana David:    Mark Wilkins may be receiving a survey from [employer] regarding your visit today. Please complete the survey to enable us to provide the highest quality of care to you and your family. If you cannot score us a very good on any question, please call the office to discuss how we could of made your experience a very good one. Thank you.       Clinical Care Team:     Dr. Alton Villalpando, [facility]      ClericalTeam:     Tom Sebastian        Electronically signed by Janet Savage DO on 5/20/2021 at 9:19 AM           Completed Refills   Requested Prescriptions     Signed Prescriptions Disp Refills   

## 2021-05-27 ENCOUNTER — OFFICE VISIT (OUTPATIENT)
Dept: FAMILY MEDICINE CLINIC | Age: 21
End: 2021-05-27
Payer: COMMERCIAL

## 2021-05-27 ENCOUNTER — HOSPITAL ENCOUNTER (OUTPATIENT)
Age: 21
Discharge: HOME | End: 2021-05-27
Payer: COMMERCIAL

## 2021-05-27 VITALS
BODY MASS INDEX: 30.92 KG/M2 | DIASTOLIC BLOOD PRESSURE: 80 MMHG | SYSTOLIC BLOOD PRESSURE: 122 MMHG | TEMPERATURE: 97.1 F | WEIGHT: 228 LBS | HEART RATE: 88 BPM | OXYGEN SATURATION: 100 %

## 2021-05-27 DIAGNOSIS — F41.0 PANIC ATTACKS: Primary | ICD-10-CM

## 2021-05-27 DIAGNOSIS — K59.1 FUNCTIONAL DIARRHEA: ICD-10-CM

## 2021-05-27 DIAGNOSIS — F41.9 ANXIETY: ICD-10-CM

## 2021-05-27 LAB — TSH SERPL DL<=0.05 MIU/L-ACNC: 0.81 MIU/L (ref 0.3–5)

## 2021-05-27 PROCEDURE — 36415 COLL VENOUS BLD VENIPUNCTURE: CPT

## 2021-05-27 PROCEDURE — 84443 ASSAY THYROID STIM HORMONE: CPT

## 2021-05-27 PROCEDURE — 99214 OFFICE O/P EST MOD 30 MIN: CPT | Performed by: STUDENT IN AN ORGANIZED HEALTH CARE EDUCATION/TRAINING PROGRAM

## 2021-05-27 RX ORDER — ALPRAZOLAM 0.25 MG/1
0.25 TABLET ORAL 3 TIMES DAILY PRN
Qty: 20 TABLET | Refills: 0 | Status: SHIPPED | OUTPATIENT
Start: 2021-05-27 | End: 2021-06-26

## 2021-05-27 NOTE — PATIENT INSTRUCTIONS
SURVEY:    You may be receiving a survey from [employer] regarding your visit today. Please complete the survey to enable us to provide the highest quality of care to you and your family. If you cannot score us a very good on any question, please call the office to discuss how we could of made your experience a very good one. Thank you.       Clinical Care Team:     Dr. Radha Ayers, [facility]      ClericalTeam:     Teodoro Leong     [facility] [address]

## 2021-05-27 NOTE — PROGRESS NOTES
HPI Notes    Name: Johan Cruz  : 2000         Chief Complaint:     Chief Complaint   Patient presents with    Abdominal Pain     Patient is here for Vomiting, diarrhea and stomach pain.  Emesis     Started saturday     Diarrhea     Been going on for weeks.  Anxiety     Patient is having increased anxiety which is making the diarrhea and vomiting worse. History of Present Illness:      HPI    Panic attack this morning so bad he had to leave work. He reported symptoms started after considering life stressors, vomiting in the bathroom, then felt light headed, chest tightness, dizzy, overwhelming sense of panic. He tried using hydroxyzine this morning, but it did not help. He is reporting that the hydroxyzine is helpful for \"the smaller panic attacks\". He state he has had three panic attacks severe enough that the hydroxyzine did not help. The vomiting is a new feature of his panic attacks. He additionally has a complaint of loose stools for several weeks, usually 2-3 times/day. Indicated type 6, 7 on [place] stool chart. Also endorses midline cramping abdominal pain occasionally relieved by defecating. This is worse when he leaves the house, he never has symptoms at home. He denies melena, hematochezia, fevers, chills, recent travel, new foods. Or food triggers. Past Medical History:     Past Medical History:   Diagnosis Date    Panic attacks 2021      Reviewed all health maintenance requirements and ordered appropriate tests  Health Maintenance Due   Topic Date Due    Hepatitis C screen  Never done    HPV vaccine (1 - Male 2-dose series) Never done    COVID-19 Vaccine (1) Never done    HIV screen  Never done       Past Surgical History:     Past Surgical History:   Procedure Laterality Date    TONSILLECTOMY          Medications:       Prior to Admission medications    Medication Sig Start Date End Date Taking?  Authorizing Provider   ALPRAZolam Francisco Overall) 0.25 MG tablet Take 1 tablet by mouth 3 times daily as needed for Anxiety for up to 30 days. 5/27/21 6/26/21 Yes Leon Pena,    FLUoxetine (PROZAC) 40 MG capsule Take 1 capsule by mouth daily 5/20/21  Yes Leon Pena DO   hydrOXYzine (ATARAX) 25 MG tablet Take 1 tablet by mouth 4 times daily as needed for Anxiety 5/20/21 5/30/21 Yes Jluis Parada, DO   naproxen sodium (ANAPROX) 550 MG tablet Take 1100mg PO at onset of headache, may take 550mg one hour later if headache persists 4/8/21  Yes Marzachery Favors, DO        Allergies:       Patient has no known allergies. Social History:     Tobacco:    reports that he has never smoked. He has never used smokeless tobacco.  Alcohol:      reports no history of alcohol use. Drug Use:  reports no history of drug use. Family History:     No family history on file. Review of Systems:         Review of Systems   Constitutional: Negative for fever. HENT: Negative for sinus pain, sneezing and sore throat. Respiratory: Negative for cough and wheezing. Cardiovascular: Negative for chest pain. Gastrointestinal: Positive for abdominal pain and diarrhea. Negative for nausea and vomiting. Genitourinary: Negative for difficulty urinating, discharge and dysuria. Musculoskeletal: Negative for back pain. Skin: Negative for rash. Hematological: Negative for adenopathy. Psychiatric/Behavioral: Positive for behavioral problems. Negative for sleep disturbance. The patient is nervous/anxious. Physical Exam:     Vitals:  /80   Pulse 88   Temp 97.1 °F (36.2 °C) (Temporal)   Wt 228 lb (103.4 kg)   SpO2 100%   BMI 30.92 kg/m²       Physical Exam  Vitals and nursing note reviewed. Constitutional:       General: He is not in acute distress. Appearance: Normal appearance. Musculoskeletal:         General: No swelling or tenderness. Normal range of motion. Skin:     General: Skin is warm and dry.       Capillary Refill: Capillary refill takes of action, intended goals, adverse effects, as well as common side effects. They were able to verbalize understanding, and repeat plan back to me. We also discussed that this was a controlled medication and that limited quantities would be prescribed. 3.  Based on history, I am strongly suspicious of diarrhea predominant irritable bowel syndrome, or the very least functional constipation. I believe would be beneficial to exclude organic causes of repeated loose stools, diarrhea in the treatment of Xifaxan if work-up suggests irritable bowel syndrome. Follow-up as needed before next appointment. Completed Refills   Requested Prescriptions     Signed Prescriptions Disp Refills    ALPRAZolam (XANAX) 0.25 MG tablet 20 tablet 0     Sig: Take 1 tablet by mouth 3 times daily as needed for Anxiety for up to 30 days. Return if symptoms worsen or fail to improve. Orders Placed This Encounter   Medications    ALPRAZolam (XANAX) 0.25 MG tablet     Sig: Take 1 tablet by mouth 3 times daily as needed for Anxiety for up to 30 days. Dispense:  20 tablet     Refill:  0     Orders Placed This Encounter   Procedures    Giardia Antigen     Standing Status:   Future     Number of Occurrences:   1     Standing Expiration Date:   5/27/2022    Gastrointestinal Panel, Molecular     Standing Status:   Future     Number of Occurrences:   1     Standing Expiration Date:   5/27/2022    Blood Occult Stool #1     Standing Status:   Future     Number of Occurrences:   1     Standing Expiration Date:   5/27/2022    TSH With Reflex Ft4     Standing Status:   Future     Number of Occurrences:   1     Standing Expiration Date:   5/27/2022    Fecal lactoferrin     Standing Status:   Future     Number of Occurrences:   1     Standing Expiration Date:   5/27/2022         Patient Instructions     SURVEY:    You may be receiving a survey from [employer] regarding your visit today.     Please complete the survey to

## 2021-05-28 ENCOUNTER — HOSPITAL ENCOUNTER (OUTPATIENT)
Age: 21
Setting detail: SPECIMEN
Discharge: HOME | End: 2021-05-28
Payer: COMMERCIAL

## 2021-05-28 DIAGNOSIS — K59.1 FUNCTIONAL DIARRHEA: ICD-10-CM

## 2021-05-28 LAB
DATE, STOOL #1: NORMAL
DATE, STOOL #2: NORMAL
DATE, STOOL #3: NORMAL
HEMOCCULT SP1 STL QL: NEGATIVE
HEMOCCULT SP2 STL QL: NORMAL
HEMOCCULT SP3 STL QL: NORMAL
TIME, STOOL #1: 1920
TIME, STOOL #2: NORMAL
TIME, STOOL #3: NORMAL

## 2021-05-28 PROCEDURE — 83630 LACTOFERRIN FECAL (QUAL): CPT

## 2021-05-28 PROCEDURE — 87506 IADNA-DNA/RNA PROBE TQ 6-11: CPT

## 2021-05-28 PROCEDURE — 82272 OCCULT BLD FECES 1-3 TESTS: CPT

## 2021-05-28 PROCEDURE — 87329 GIARDIA AG IA: CPT

## 2021-05-28 ASSESSMENT — ENCOUNTER SYMPTOMS
DIARRHEA: 1
COUGH: 0
WHEEZING: 0
ABDOMINAL PAIN: 1
SORE THROAT: 0
SINUS PAIN: 0
BACK PAIN: 0
VOMITING: 0
NAUSEA: 0

## 2021-05-29 LAB
CAMPYLOBACTER PCR: NORMAL
E COLI ENTEROTOXIGENIC PCR: NORMAL
LACTOFERRIN, QUAL: NORMAL
PLESIOMONAS SHIGELLOIDES PCR: NORMAL
SALMONELLA PCR: NORMAL
SHIGATOXIN GENE PCR: NORMAL
SHIGELLA SP PCR: NORMAL
SPECIMEN DESCRIPTION: NORMAL
VIBRIO PCR: NORMAL
YERSINIA ENTEROCOLITICA PCR: NORMAL

## 2021-06-01 DIAGNOSIS — K59.1 FUNCTIONAL DIARRHEA: Primary | ICD-10-CM

## 2021-06-01 DIAGNOSIS — K58.0 IRRITABLE BOWEL SYNDROME WITH DIARRHEA: ICD-10-CM

## 2021-06-01 LAB
DIRECT EXAM: NORMAL
Lab: NORMAL
SPECIMEN DESCRIPTION: NORMAL

## 2021-06-14 DIAGNOSIS — Z76.0 MEDICATION REFILL: ICD-10-CM

## 2021-06-14 DIAGNOSIS — F32.1 CURRENT MODERATE EPISODE OF MAJOR DEPRESSIVE DISORDER WITHOUT PRIOR EPISODE (HCC): ICD-10-CM

## 2021-06-15 RX ORDER — FLUOXETINE HYDROCHLORIDE 40 MG/1
40 CAPSULE ORAL DAILY
Qty: 90 CAPSULE | Refills: 1 | Status: SHIPPED | OUTPATIENT
Start: 2021-06-15 | End: 2021-09-10

## 2021-09-04 ENCOUNTER — HOSPITAL ENCOUNTER (EMERGENCY)
Age: 21
Discharge: HOME | End: 2021-09-04
Attending: FAMILY MEDICINE
Payer: COMMERCIAL

## 2021-09-04 ENCOUNTER — APPOINTMENT (OUTPATIENT)
Dept: GENERAL RADIOLOGY | Age: 21
End: 2021-09-04
Payer: COMMERCIAL

## 2021-09-04 VITALS
TEMPERATURE: 98.2 F | WEIGHT: 234.1 LBS | OXYGEN SATURATION: 98 % | SYSTOLIC BLOOD PRESSURE: 144 MMHG | RESPIRATION RATE: 18 BRPM | DIASTOLIC BLOOD PRESSURE: 91 MMHG | HEART RATE: 100 BPM | BODY MASS INDEX: 31.75 KG/M2

## 2021-09-04 DIAGNOSIS — J40 BRONCHITIS: Primary | ICD-10-CM

## 2021-09-04 LAB
SARS-COV-2, RAPID: NOT DETECTED
SPECIMEN DESCRIPTION: NORMAL

## 2021-09-04 PROCEDURE — 6360000002 HC RX W HCPCS: Performed by: FAMILY MEDICINE

## 2021-09-04 PROCEDURE — 99284 EMERGENCY DEPT VISIT MOD MDM: CPT

## 2021-09-04 PROCEDURE — C9803 HOPD COVID-19 SPEC COLLECT: HCPCS

## 2021-09-04 PROCEDURE — 94664 DEMO&/EVAL PT USE INHALER: CPT

## 2021-09-04 PROCEDURE — 87635 SARS-COV-2 COVID-19 AMP PRB: CPT

## 2021-09-04 PROCEDURE — 71046 X-RAY EXAM CHEST 2 VIEWS: CPT

## 2021-09-04 RX ORDER — ALBUTEROL SULFATE 2.5 MG/3ML
2.5 SOLUTION RESPIRATORY (INHALATION) ONCE
Status: DISCONTINUED | OUTPATIENT
Start: 2021-09-04 | End: 2021-09-04

## 2021-09-04 RX ORDER — DEXAMETHASONE 4 MG/1
4 TABLET ORAL
Qty: 10 TABLET | Refills: 0 | Status: SHIPPED | OUTPATIENT
Start: 2021-09-04 | End: 2021-09-13

## 2021-09-04 RX ORDER — LEVALBUTEROL INHALATION SOLUTION 0.63 MG/3ML
0.63 SOLUTION RESPIRATORY (INHALATION) ONCE
Status: COMPLETED | OUTPATIENT
Start: 2021-09-04 | End: 2021-09-04

## 2021-09-04 RX ORDER — PROMETHAZINE HYDROCHLORIDE AND CODEINE PHOSPHATE 6.25; 1 MG/5ML; MG/5ML
5 SOLUTION ORAL EVERY 4 HOURS PRN
Qty: 120 ML | Refills: 0 | Status: SHIPPED | OUTPATIENT
Start: 2021-09-04 | End: 2021-09-09

## 2021-09-04 RX ORDER — DEXAMETHASONE 4 MG/1
6 TABLET ORAL ONCE
Status: COMPLETED | OUTPATIENT
Start: 2021-09-04 | End: 2021-09-04

## 2021-09-04 RX ADMIN — DEXAMETHASONE 6 MG: 4 TABLET ORAL at 06:37

## 2021-09-04 RX ADMIN — LEVALBUTEROL 0.63 MG: 0.63 SOLUTION RESPIRATORY (INHALATION) at 05:58

## 2021-09-04 ASSESSMENT — PAIN SCALES - GENERAL: PAINLEVEL_OUTOF10: 3

## 2021-09-04 ASSESSMENT — PAIN DESCRIPTION - LOCATION: LOCATION: GENERALIZED

## 2021-09-04 ASSESSMENT — PAIN DESCRIPTION - DESCRIPTORS: DESCRIPTORS: ACHING

## 2021-09-04 ASSESSMENT — PAIN DESCRIPTION - PAIN TYPE: TYPE: ACUTE PAIN

## 2021-09-04 NOTE — ED PROVIDER NOTES
eMERGENCY dEPARTMENT eNCOUnter        [address] [facility]    Chief Complaint   Patient presents with    Concern For [id number]     patient states on Thursday he developed fever, cough, body ache and chest tightness after coughing. Not vaccinated       HPI    Lorne Gunn is a 24 y.o. male who presents with cough and fever since Thursday. He has had body aches. Concerned about Covid type infection. His girlfriend had Covid test yesterday which was negative. His cough is nonproductive. It causes significant tightness in his chest.  He uses DayQuil and NyQuil without resolution of this problem. No hemoptysis. His mother does smoke. REVIEW OF SYSTEMS    Const: Fever and chills. No loss of taste or smell  GI: No abdominal pain or diarrhea. No nausea or vomiting. : No urinary difficulty or flank pain. CV: Chest discomfort relates mostly to breathing difficulty. Derm: No skin rashes or itching. No hive formation. Neuro: Feels fatigued. No syncope. Occasional headache. No paresthesias or dysesthesias. All body systems reviewed and otherwise negative    PAST MEDICAL HISTORY    Past Medical History:   Diagnosis Date    Panic attacks 5/27/2021       SURGICAL HISTORY    Past Surgical History:   Procedure Laterality Date    TONSILLECTOMY         CURRENT MEDICATIONS    Current Outpatient Rx   Medication Sig Dispense Refill    dexamethasone (DECADRON) 4 MG tablet Take 1 tablet by mouth daily (with breakfast) for 10 days For lung inflammation 10 tablet 0    promethazine-codeine (PHENERGAN WITH CODEINE) 6.25-10 MG/5ML SOLN solution Take 5 mLs by mouth every 4 hours as needed for Cough for up to 5 days. 120 mL 0    FLUoxetine (PROZAC) 40 MG capsule Take 1 capsule by mouth daily 90 capsule 1    naproxen sodium (ANAPROX) 550 MG tablet Take 1100mg PO at onset of headache, may take 550mg one hour later if headache persists 60 tablet 3       ALLERGIES    No Known Allergies    FAMILY HISTORY    History reviewed. No pertinent family history. SOCIAL HISTORY    Social History     Socioeconomic History    Marital status: Single     Spouse name: None    Number of children: None    Years of education: None    Highest education level: None   Occupational History    None   Tobacco Use    Smoking status: Never Smoker    Smokeless tobacco: Never Used   Vaping Use    Vaping Use: Every day    Substances: Never   Substance and Sexual Activity    Alcohol use: No    Drug use: No    Sexual activity: None   Other Topics Concern    None   Social History Narrative    None     Social Determinants of Health     Financial Resource Strain: Low Risk     Difficulty of Paying Living Expenses: Not hard at all   Food Insecurity: No Food Insecurity    Worried About Running Out of Food in the Last Year: Never true    Latoya of Food in the Last Year: Never true   Transportation Needs:     Lack of Transportation (Medical):  Lack of Transportation (Non-Medical):    Physical Activity:     Days of Exercise per Week:     Minutes of Exercise per Session:    Stress:     Feeling of Stress :    Social Connections:     Frequency of Communication with Friends and Family:     Frequency of Social Gatherings with Friends and Family:     Attends [employer] Services:     Active Member of Clubs or Organizations:     Attends Club or Organization Meetings:     Marital Status:    Intimate Partner Violence:     Fear of Current or Ex-Partner:     Emotionally Abused:     Physically Abused:     Sexually Abused:        PHYSICAL EXAM    VITAL SIGNS: BP (!) 144/91   Pulse 100   Temp 98.2 °F (36.8 °C) (Oral)   Resp 18   Wt 234 lb 1.6 oz (106.2 kg)   SpO2 98%   BMI 31.75 kg/m²   Constitutional:  Well developed, well nourished, 49-year-old male with harsh cough and body aches, no acute distress, non-toxic appearance   Eyes: PERRL, conjunctiva normal, sclera white  HENT: Head is normocephalic and no trauma. EACs are patent.   Nares patent with no epistaxis or rhinorrhea. Oropharynx shows tongue and uvula normal size. Posterior erythema but no exudate. The neck is supple and moved well. Trachea is midline. No thyroid tenderness. Respiratory: Harsh cough with expiratory wheeze. No stridor. Cardiovascular:  Normal rate, normal rhythm, no murmurs, no gallops, no rubs   Abdomen is soft and nontender  Musculoskeletal:  No edema or tenderness of the extremities. The patient shows controlled coordinated movements of his extremities. His gait is normal.  Spine is grossly aligned and nontender. Integument:  Well hydrated, no rash     RADIOLOGY/PROCEDURES    Chest x-ray is unremarkable    Covid PCR test is negative  ED COURSE & MEDICAL DECISION MAKING    Pertinent Labs & Imaging studies reviewed. (See chart for details)    Summation      Patient Course: 80-year-old male with cough and chest discomfort is evaluated. Harsh nonproductive cough and tachycardia. Improved breathing after Xopenex treatment. His heart rate does drop also. His chest x-ray is unremarkable. His Covid PCR test is negative. He will be treated for bronchitis and prescriptions for dexamethasone and Phenergan with codeine. Findings are discussed with the patient. He is significantly improved after aerosol treatment. Continue treatment at home. Follow-up with PCP stable at discharge. ED Medications administered this visit:    Medications   dexamethasone (DECADRON) tablet 6 mg (has no administration in time range)   levalbuterol (XOPENEX) nebulization 0.63 mg (0.63 mg Nebulization Given 9/4/21 0558)       New Prescriptions from this visit:    New Prescriptions    DEXAMETHASONE (DECADRON) 4 MG TABLET    Take 1 tablet by mouth daily (with breakfast) for 10 days For lung inflammation    PROMETHAZINE-CODEINE (PHENERGAN WITH CODEINE) 6.25-10 MG/5ML SOLN SOLUTION    Take 5 mLs by mouth every 4 hours as needed for Cough for up to 5 days.        Follow-up:  Rachelle Casiano   [address] [address]  [phone]    Schedule an appointment as soon as possible for a visit in 3 days  If symptoms worsen        Final Impression:   1.  Bronchitis               (Please note that portions of this note were completed with a voice recognition program.  Efforts were made to edit the dictations but occasionally words are mis-transcribed.)        Matthew Palacios DO  09/14/21 0002

## 2021-09-10 ENCOUNTER — HOSPITAL ENCOUNTER (EMERGENCY)
Age: 21
Discharge: HOME | DRG: 872 | End: 2021-09-10
Attending: INTERNAL MEDICINE
Payer: COMMERCIAL

## 2021-09-10 ENCOUNTER — APPOINTMENT (OUTPATIENT)
Dept: GENERAL RADIOLOGY | Age: 21
DRG: 872 | End: 2021-09-10
Payer: COMMERCIAL

## 2021-09-10 ENCOUNTER — APPOINTMENT (OUTPATIENT)
Dept: CT IMAGING | Age: 21
DRG: 872 | End: 2021-09-10
Payer: COMMERCIAL

## 2021-09-10 VITALS
SYSTOLIC BLOOD PRESSURE: 126 MMHG | HEIGHT: 72 IN | WEIGHT: 242.9 LBS | RESPIRATION RATE: 22 BRPM | OXYGEN SATURATION: 97 % | TEMPERATURE: 98.4 F | BODY MASS INDEX: 32.9 KG/M2 | DIASTOLIC BLOOD PRESSURE: 75 MMHG | HEART RATE: 80 BPM

## 2021-09-10 DIAGNOSIS — M02.9: ICD-10-CM

## 2021-09-10 DIAGNOSIS — M25.50 POLYARTHRALGIA: Primary | ICD-10-CM

## 2021-09-10 LAB
ABSOLUTE EOS #: 0.2 K/UL (ref 0–0.4)
ABSOLUTE IMMATURE GRANULOCYTE: ABNORMAL K/UL (ref 0–0.3)
ABSOLUTE LYMPH #: 4.8 K/UL (ref 1–4.8)
ABSOLUTE MONO #: 0.8 K/UL (ref 0–1)
ALBUMIN SERPL-MCNC: 4.1 G/DL (ref 3.5–5.2)
ALBUMIN/GLOBULIN RATIO: ABNORMAL (ref 1–2.5)
ALP BLD-CCNC: 116 U/L (ref 40–129)
ALT SERPL-CCNC: 37 U/L (ref 5–41)
ANION GAP SERPL CALCULATED.3IONS-SCNC: 11 MMOL/L (ref 9–17)
AST SERPL-CCNC: 26 U/L
BASOPHILS # BLD: 3 % (ref 0–2)
BASOPHILS ABSOLUTE: 0.5 K/UL (ref 0–0.2)
BILIRUB SERPL-MCNC: 0.24 MG/DL (ref 0.3–1.2)
BILIRUBIN URINE: NEGATIVE
BUN BLDV-MCNC: 14 MG/DL (ref 6–20)
BUN/CREAT BLD: 12 (ref 9–20)
CALCIUM SERPL-MCNC: 9.7 MG/DL (ref 8.6–10.4)
CHLORIDE BLD-SCNC: 102 MMOL/L (ref 98–107)
CO2: 28 MMOL/L (ref 20–31)
COLOR: YELLOW
COMMENT UA: NORMAL
CREAT SERPL-MCNC: 1.13 MG/DL (ref 0.7–1.2)
D-DIMER QUANTITATIVE: <0.27 MG/L FEU (ref 0–0.59)
DIFFERENTIAL TYPE: YES
DIRECT EXAM: NORMAL
EOSINOPHILS RELATIVE PERCENT: 1 % (ref 0–5)
GFR AFRICAN AMERICAN: >60 ML/MIN
GFR NON-AFRICAN AMERICAN: >60 ML/MIN
GFR SERPL CREATININE-BSD FRML MDRD: ABNORMAL ML/MIN/{1.73_M2}
GFR SERPL CREATININE-BSD FRML MDRD: ABNORMAL ML/MIN/{1.73_M2}
GLUCOSE BLD-MCNC: 100 MG/DL (ref 70–99)
GLUCOSE URINE: NEGATIVE
HCT VFR BLD CALC: 42.3 % (ref 41–53)
HEMOGLOBIN: 14.2 G/DL (ref 13.5–17.5)
IMMATURE GRANULOCYTES: ABNORMAL %
KETONES, URINE: NEGATIVE
LEUKOCYTE ESTERASE, URINE: NEGATIVE
LYMPHOCYTES # BLD: 29 % (ref 13–44)
Lab: NORMAL
MCH RBC QN AUTO: 28.2 PG (ref 26–34)
MCHC RBC AUTO-ENTMCNC: 33.5 G/DL (ref 31–37)
MCV RBC AUTO: 84 FL (ref 80–100)
MONOCYTES # BLD: 5 % (ref 5–9)
NITRITE, URINE: NEGATIVE
NRBC AUTOMATED: ABNORMAL PER 100 WBC
PDW BLD-RTO: 14.4 % (ref 12.1–15.2)
PH UA: 7 (ref 5–8)
PLATELET # BLD: 413 K/UL (ref 140–450)
PLATELET ESTIMATE: ABNORMAL
PMV BLD AUTO: ABNORMAL FL (ref 6–12)
POTASSIUM SERPL-SCNC: 3.8 MMOL/L (ref 3.7–5.3)
PROTEIN UA: NEGATIVE
RBC # BLD: 5.04 M/UL (ref 4.5–5.9)
RBC # BLD: ABNORMAL 10*6/UL
SARS-COV-2, RAPID: NOT DETECTED
SEDIMENTATION RATE, ERYTHROCYTE: 17 MM (ref 0–15)
SEG NEUTROPHILS: 62 % (ref 39–75)
SEGMENTED NEUTROPHILS ABSOLUTE COUNT: 10.4 K/UL (ref 2.1–6.5)
SODIUM BLD-SCNC: 141 MMOL/L (ref 135–144)
SPECIFIC GRAVITY UA: 1.01 (ref 1–1.03)
SPECIMEN DESCRIPTION: NORMAL
SPECIMEN DESCRIPTION: NORMAL
TOTAL CK: 41 U/L (ref 39–308)
TOTAL PROTEIN: 7.2 G/DL (ref 6.4–8.3)
TURBIDITY: CLEAR
URINE HGB: NEGATIVE
UROBILINOGEN, URINE: NORMAL
WBC # BLD: 16.7 K/UL (ref 4.5–13.5)
WBC # BLD: ABNORMAL 10*3/UL

## 2021-09-10 PROCEDURE — 82550 ASSAY OF CK (CPK): CPT

## 2021-09-10 PROCEDURE — 85379 FIBRIN DEGRADATION QUANT: CPT

## 2021-09-10 PROCEDURE — 87205 SMEAR GRAM STAIN: CPT

## 2021-09-10 PROCEDURE — 2580000003 HC RX 258: Performed by: INTERNAL MEDICINE

## 2021-09-10 PROCEDURE — 93005 ELECTROCARDIOGRAM TRACING: CPT | Performed by: INTERNAL MEDICINE

## 2021-09-10 PROCEDURE — 6360000002 HC RX W HCPCS: Performed by: INTERNAL MEDICINE

## 2021-09-10 PROCEDURE — 87880 STREP A ASSAY W/OPTIC: CPT

## 2021-09-10 PROCEDURE — 83874 ASSAY OF MYOGLOBIN: CPT

## 2021-09-10 PROCEDURE — 71275 CT ANGIOGRAPHY CHEST: CPT

## 2021-09-10 PROCEDURE — 86063 ANTISTREPTOLYSIN O SCREEN: CPT

## 2021-09-10 PROCEDURE — 81003 URINALYSIS AUTO W/O SCOPE: CPT

## 2021-09-10 PROCEDURE — 87081 CULTURE SCREEN ONLY: CPT

## 2021-09-10 PROCEDURE — 6370000000 HC RX 637 (ALT 250 FOR IP): Performed by: INTERNAL MEDICINE

## 2021-09-10 PROCEDURE — 36415 COLL VENOUS BLD VENIPUNCTURE: CPT

## 2021-09-10 PROCEDURE — 85025 COMPLETE CBC W/AUTO DIFF WBC: CPT

## 2021-09-10 PROCEDURE — 71046 X-RAY EXAM CHEST 2 VIEWS: CPT

## 2021-09-10 PROCEDURE — 87635 SARS-COV-2 COVID-19 AMP PRB: CPT

## 2021-09-10 PROCEDURE — 85652 RBC SED RATE AUTOMATED: CPT

## 2021-09-10 PROCEDURE — C9803 HOPD COVID-19 SPEC COLLECT: HCPCS

## 2021-09-10 PROCEDURE — 87040 BLOOD CULTURE FOR BACTERIA: CPT

## 2021-09-10 PROCEDURE — 86738 MYCOPLASMA ANTIBODY: CPT

## 2021-09-10 PROCEDURE — 86403 PARTICLE AGGLUT ANTBDY SCRN: CPT

## 2021-09-10 PROCEDURE — 6360000004 HC RX CONTRAST MEDICATION: Performed by: INTERNAL MEDICINE

## 2021-09-10 PROCEDURE — 80053 COMPREHEN METABOLIC PANEL: CPT

## 2021-09-10 RX ORDER — OXYCODONE HYDROCHLORIDE AND ACETAMINOPHEN 5; 325 MG/1; MG/1
1 TABLET ORAL EVERY 6 HOURS PRN
Qty: 10 TABLET | Refills: 0 | Status: SHIPPED | OUTPATIENT
Start: 2021-09-10 | End: 2021-09-13

## 2021-09-10 RX ORDER — METHYLPREDNISOLONE SODIUM SUCCINATE 125 MG/2ML
80 INJECTION, POWDER, LYOPHILIZED, FOR SOLUTION INTRAMUSCULAR; INTRAVENOUS ONCE
Status: COMPLETED | OUTPATIENT
Start: 2021-09-10 | End: 2021-09-10

## 2021-09-10 RX ORDER — ONDANSETRON 2 MG/ML
4 INJECTION INTRAMUSCULAR; INTRAVENOUS ONCE
Status: COMPLETED | OUTPATIENT
Start: 2021-09-10 | End: 2021-09-10

## 2021-09-10 RX ORDER — CYCLOBENZAPRINE HCL 10 MG
10 TABLET ORAL ONCE
Status: COMPLETED | OUTPATIENT
Start: 2021-09-10 | End: 2021-09-10

## 2021-09-10 RX ORDER — OXYCODONE HYDROCHLORIDE AND ACETAMINOPHEN 5; 325 MG/1; MG/1
1 TABLET ORAL ONCE
Status: COMPLETED | OUTPATIENT
Start: 2021-09-10 | End: 2021-09-10

## 2021-09-10 RX ORDER — 0.9 % SODIUM CHLORIDE 0.9 %
1000 INTRAVENOUS SOLUTION INTRAVENOUS ONCE
Status: COMPLETED | OUTPATIENT
Start: 2021-09-10 | End: 2021-09-10

## 2021-09-10 RX ORDER — KETOROLAC TROMETHAMINE 30 MG/ML
30 INJECTION, SOLUTION INTRAMUSCULAR; INTRAVENOUS ONCE
Status: COMPLETED | OUTPATIENT
Start: 2021-09-10 | End: 2021-09-10

## 2021-09-10 RX ORDER — CYCLOBENZAPRINE HCL 10 MG
10 TABLET ORAL 3 TIMES DAILY PRN
Qty: 30 TABLET | Refills: 0 | Status: SHIPPED | OUTPATIENT
Start: 2021-09-10 | End: 2021-09-15

## 2021-09-10 RX ORDER — OXYCODONE HYDROCHLORIDE AND ACETAMINOPHEN 5; 325 MG/1; MG/1
2 TABLET ORAL EVERY 4 HOURS PRN
Status: DISCONTINUED | OUTPATIENT
Start: 2021-09-10 | End: 2021-09-11 | Stop reason: HOSPADM

## 2021-09-10 RX ORDER — MORPHINE SULFATE 4 MG/ML
4 INJECTION, SOLUTION INTRAMUSCULAR; INTRAVENOUS ONCE
Status: COMPLETED | OUTPATIENT
Start: 2021-09-10 | End: 2021-09-10

## 2021-09-10 RX ADMIN — IOPAMIDOL 100 ML: 755 INJECTION, SOLUTION INTRAVENOUS at 18:14

## 2021-09-10 RX ADMIN — KETOROLAC TROMETHAMINE 30 MG: 30 INJECTION, SOLUTION INTRAMUSCULAR; INTRAVENOUS at 15:39

## 2021-09-10 RX ADMIN — ONDANSETRON 4 MG: 2 INJECTION INTRAMUSCULAR; INTRAVENOUS at 18:25

## 2021-09-10 RX ADMIN — SODIUM CHLORIDE 1000 ML: 9 INJECTION, SOLUTION INTRAVENOUS at 15:37

## 2021-09-10 RX ADMIN — OXYCODONE HYDROCHLORIDE AND ACETAMINOPHEN 1 TABLET: 5; 325 TABLET ORAL at 21:00

## 2021-09-10 RX ADMIN — MORPHINE SULFATE 4 MG: 4 INJECTION, SOLUTION INTRAMUSCULAR; INTRAVENOUS at 18:25

## 2021-09-10 RX ADMIN — METHYLPREDNISOLONE SODIUM SUCCINATE 80 MG: 125 INJECTION, POWDER, FOR SOLUTION INTRAMUSCULAR; INTRAVENOUS at 17:37

## 2021-09-10 RX ADMIN — CYCLOBENZAPRINE 10 MG: 10 TABLET, FILM COATED ORAL at 19:23

## 2021-09-10 ASSESSMENT — PAIN SCALES - GENERAL
PAINLEVEL_OUTOF10: 9
PAINLEVEL_OUTOF10: 6
PAINLEVEL_OUTOF10: 6
PAINLEVEL_OUTOF10: 9
PAINLEVEL_OUTOF10: 5
PAINLEVEL_OUTOF10: 8

## 2021-09-10 ASSESSMENT — PAIN DESCRIPTION - LOCATION
LOCATION: LEG
LOCATION: CHEST

## 2021-09-10 ASSESSMENT — PAIN DESCRIPTION - FREQUENCY: FREQUENCY: CONTINUOUS

## 2021-09-10 ASSESSMENT — PAIN DESCRIPTION - PAIN TYPE
TYPE: ACUTE PAIN
TYPE: ACUTE PAIN

## 2021-09-10 ASSESSMENT — PAIN DESCRIPTION - ONSET: ONSET: ON-GOING

## 2021-09-10 ASSESSMENT — PAIN DESCRIPTION - PROGRESSION: CLINICAL_PROGRESSION: GRADUALLY WORSENING

## 2021-09-10 ASSESSMENT — PAIN DESCRIPTION - ORIENTATION
ORIENTATION: LEFT;RIGHT
ORIENTATION: MID

## 2021-09-10 ASSESSMENT — PAIN DESCRIPTION - DESCRIPTORS: DESCRIPTORS: SHOOTING

## 2021-09-10 NOTE — ED PROVIDER NOTES
[facility] ED  EMERGENCY DEPARTMENT ENCOUNTER      Pt Name: Jigar Cooper  MRN: [id number]  [place] 2000  Date of evaluation: 9/10/2021  Provider: Iam Reed MD    [address] [place] [address]       Chief Complaint   Patient presents with    Chest Pain     chest tightness with bilateral leg pain         HISTORY OF PRESENT ILLNESS   (Location/Symptom, Timing/Onset, Context/Setting, Quality, Duration, Modifying Factors, Severity)  Note limiting factors. Jigar Cooper is a 24 y.o. male who has a history of depression, migraines, panic attacks, IBS, presents to the emergency department for evaluation and management of shortness of breath, chest tightness, bilateral leg pain that started today. He denies trauma, prolonged sitting, travel or extensive exercise. He denies tobacco or IV drug use. LUPE Gonzalez reported that she observed that he was SOB with ambulation and looked uncomfortable while bearing weight on his legs. Initially he explained that he is muscles were aching and tightening but then refocused and explained that his ankle and knee joints were painful and the pain was radiating away from them into his muscles. 1 week ago he was evaluated in this emergency department. Treated conservatively for bronchitis. Denies sore throat, fever, rash but states that he has had chills. He has not seen any other providers for this problem. This patient is being evaluated and treated during the COVID-19 pandemic, Delta variant. This patient has received dose of the COVID vaccine. There is an area wide shortage of hospital beds. HPI    Nursing Notes were reviewed. REVIEW OF SYSTEMS    (2-9 systems for level 4, 10 or more for level 5)       REVIEW OF SYSTEMS    Constitutional: Positive for chills, Negative for fatigue and fever. HENT: Negative for dental problem, ear pain and sore throat. Eyes: Negative for pain and redness.    Respiratory: Pos for chest tightness, SOB, Negative for cough. Cardiovascular: Negative for chest pain, palpitations and leg swelling. Gastrointestinal: Negative for abdominal distention, abdominal pain, diarrhea, nausea and vomiting. Genitourinary: Negative for difficulty urinating, dysuria, hematuria and urgency. Musculoskeletal: Positive for polyarthralgia, polymyalgia, negative for back pain and neck pain. Skin: Negative for color change, pallor, rash and wound. Allergic/Immunologic: Negative for environmental, medication and food allergies. Neurological: Negative for dizziness, speech difficulty, weakness and headaches. Hematological: Negative for adenopathy. Does not bruise/bleed easily. Except as noted above the remainder of the review of systems was reviewed and negative. PASTMEDICAL HISTORY     Past Medical History:   Diagnosis Date    Panic attacks 5/27/2021         SURGICAL HISTORY       Past Surgical History:   Procedure Laterality Date    TONSILLECTOMY           CURRENT MEDICATIONS       Discharge Medication List as of 9/10/2021  9:46 PM      CONTINUE these medications which have NOT CHANGED    Details   dexamethasone (DECADRON) 4 MG tablet Take 1 tablet by mouth daily (with breakfast) for 10 days For lung inflammation, Disp-10 tablet, R-0Print             ALLERGIES     Patient has no known allergies. FAMILY HISTORY     History reviewed. No pertinent family history.        SOCIAL HISTORY       Social History     Socioeconomic History    Marital status: Single     Spouse name: None    Number of children: None    Years of education: None    Highest education level: None   Occupational History    None   Tobacco Use    Smoking status: Never Smoker    Smokeless tobacco: Never Used   Vaping Use    Vaping Use: Every day    Substances: Never   Substance and Sexual Activity    Alcohol use: No    Drug use: No    Sexual activity: None   Other Topics Concern    None   Social History Narrative    None     Social Determinants of Health     Financial Resource Strain: Low Risk     Difficulty of Paying Living Expenses: Not hard at all   Food Insecurity: No Food Insecurity    Worried About Running Out of Food in the Last Year: Never true    Latoya of Food in the Last Year: Never true   Transportation Needs:     Lack of Transportation (Medical):  Lack of Transportation (Non-Medical):    Physical Activity:     Days of Exercise per Week:     Minutes of Exercise per Session:    Stress:     Feeling of Stress :    Social Connections:     Frequency of Communication with Friends and Family:     Frequency of Social Gatherings with Friends and Family:     Attends Latter day Services:     Active Member of Clubs or Organizations:     Attends Club or Organization Meetings:     Marital Status:    Intimate Partner Violence:     Fear of Current or Ex-Partner:     Emotionally Abused:     Physically Abused:     Sexually Abused:        SCREENINGS    Shobha Coma Scale  Eye Opening: Spontaneous  Best Verbal Response: Oriented  Best Motor Response: Obeys commands  Shobha Coma Scale Score: 15        PHYSICAL EXAM    (up to 7 for level 4, 8 or more for level 5)     ED Triage Vitals [09/10/21 1506]   BP Temp Temp Source Pulse Resp SpO2 Height Weight   134/85 98.4 °F (36.9 °C) Oral 115 24 100 % 6' (1.829 m) 242 lb 14.4 oz (110.2 kg)       Physical Exam  Physical Exam   Constitutional:  Appears well-developed and well-nourished. Mi;d distress noted from pain in lower extremiites. However, Non toxic in appearance. Supportive Friend at bedside. HENT:     Head: Normocephalic and atraumatic. Right Ear: External ear normal. The canal is normal in appearance without erythema, edema or exudate. TM normal pearly light reflex. LeftEar: External ear normal.  The canal is normal in appearance without erythema, edema or exudate. TM normal pearly light reflex.     Nose: Nose normal.     Mouth/Throat: Oropharynx is clear and mucosa moist. No oropharyngeal exudate noted. Posterior pharynx is pink and noninjected. Eyes: Conjunctivae and EOM are normal. Pupils are equal, round, and reactive to light. No scleral icterus. There is no tearing or drainage. Neck: Normal range of motion. Neck supple. No tracheal deviation present. Cardiovascular: Increased rate, regular rhythm, normal heartsounds and intact distal pulses. Exam reveals no gallop or friction rub. No murmur heard. Pulmonary/Chest: Effort normal and breath sounds are symmetric and normal. Mild SOB with ambulation. There are no wheezes, rales or rhonchi. No tenderness is exhibited upon palpation of the chest wall. Abdominal: Soft. Bowel sounds are normal. No distension or no mass exhibitted. There is no tenderness, rebound, rigidity or guarding. Genitourinary:   No CVA tenderness noted on examination. Musculoskeletal: Normal range of motion of all extremities and torso. No edema, redness or deformity noted of the lower extremities. Palpation of the ankles and knees demonstrates tenderness but no overlying warmth, erythema or edema. Palpation of the large muscle groups of the lower extremities demonstrates no tenderness, erythema, induration or masses. Palpation of the hips and upper extremity joints demonstrates no tenderness with palpation or range of motion. Lymphadenopathy:  No cervical adenopathy. Neurological:   alert and oriented to person, place, and time. Normal speech, normal comprehension, normal cognition,  Reflexes are normal.  There are no cranial nerve deficits. Normal muscle tone, motor and sensory function including SILT exhibited. Coordination normal and gait normal. Strength 5/5 in all extremities and torso. Skin: Skin is warm and dry. No rash, petechiae, purpura noted. No diaphoresis. No erythema. No pallor. Psychiatric: Pleasant and cooperative. Normal mood and affect.  Behavior is  normal. Judgment and thought content normal.     DIAGNOSTIC RESULTS     EKG: All EKG's are interpreted by the Emergency Department Physician who either signs or Co-signs this chart in the absence of a cardiologist.    A 12-lead EKG was performed at 1507. There is sinus tachycardia with a ventricular rate 108 bpm.  There is a normal MO interval, QRS duration, QT, QTC and axis. NNonspecific T wave changes are noted in the lateral leads. RADIOLOGY:   Non-plain film images such as CT, Ultrasoundand MRI are read by the radiologist. Plain radiographic images are visualized and preliminarily interpreted by the emergency physician with the below findings:    Not indicated. Interpretation per the Radiologist below, if available at the time of this note:    CTA CHEST W CONTRAST   Final Result   No evidence of pulmonary embolus. No thoracic aortic aneurysm or    dissection. No evidence of pneumonia noting minimal dependent changes. Included    abdomen without obvious acute pathology although noting a small hiatus hernia    and air and mild wall thickening in the esophagus suggesting reflux and    possible esophagitis. Bony structures with endplate changes greater than typical for a patient of    this age, clinical correlation suggested.          XR CHEST (2 VW)   Final Result   Negative chest.                  ED BEDSIDE ULTRASOUND:   Performed by ED Physician - none    LABS:  Labs Reviewed   CULTURE, BLOOD 1 - Abnormal; Notable for the following components:       Result Value    Culture   (*)     Value: POSITIVE BLOOD CULTURE, RN NOTIFIED: NIKOLE BUSTAMANTE IN ER [id number] [id number] PER NM    Culture STAPHYLOCOCCUS SPECIES (*)     All other components within normal limits   CBC WITH AUTO DIFFERENTIAL - Abnormal; Notable for the following components:    WBC 16.7 (*)     Basophils 3 (*)     Segs Absolute 10.40 (*)     Basophils Absolute 0.50 (*)     All other components within normal limits   COMPREHENSIVE METABOLIC PANEL W/ REFLEX TO MG FOR LOW K - Abnormal; Notable for the following components:    Glucose 100 (*) Total Bilirubin 0.24 (*)     All other components within normal limits   SEDIMENTATION RATE - Abnormal; Notable for the following components:    Sed Rate 17 (*)     All other components within normal limits   MYOGLOBIN, SERUM - Abnormal; Notable for the following components:    Myoglobin <21 (*)     All other components within normal limits   COVID-19, RAPID   CULTURE, BLOOD 2   STREP SCREEN GROUP A THROAT   CULTURE, THROAT   CK   D-DIMER, QUANTITATIVE   URINALYSIS   ANTISTREPTOLYSIN O SCREEN   MYCOPLASMA PNEUMONIAE ANTIBODY, IGM       All other labs were within normal range or not returned as of this dictation. EMERGENCY DEPARTMENT COURSE and DIFFERENTIAL DIAGNOSIS/MDM:   Vitals:    Vitals:    09/10/21 2119 09/10/21 2135 09/10/21 2142 09/10/21 2150   BP: 139/82 134/82 134/82 126/75   Pulse: 86 83 96 80   Resp: 18 20 18 22   Temp:       TempSrc:       SpO2: 99% 98% 100% 97%   Weight:       Height:           Noted    MDM    CRITICAL CARE TIME   Total Critical Care time was 50 minutes, excluding separately reportable procedures. This included evaluation of data, bedside patient evaluation and care, contact and communication with other providers and hospitals and contact and communication with family. There was a high probability of clinically significant/life threatening deterioration in the patient's condition which required my urgent intervention. [facility]  ED Course as of Sep 12 1414   Fri Sep 10, 2021   1800 Patient's pain only slightly improved after analgesics. He is more comfortable an while lying in the bed and as long his joints are not disrupted. [MS]   4406 Patient appears more comfortable in bed. However when asked he states that the pain in his joints continues to be moderate to severe with only minimal improvement after analgesics were provided. [MS]   7461 I requested a call be placed to transfer center to initiate consultation with infectious disease at [facility]. [facility] in [facility]. [MS]   2041 Infectious disease consultant from [facility] in [place], Dr. Garrett Scott, returned call to discuss this case. I explained to him that the patient had intractable tenderness and pain in his knees and ankles bilaterally. I was concerned about this patient. Leukocytosis and intractable pain. I explained to him that he was evaluated with CT of the chest which was negative for PE. No trauma preceding this. I explained that there was no warmth, erythema or swelling involving these joints. He had no history of arthritis, there was no history of rheumatological conditions in him or his family members.  recommended performing myoglobin, mycoplasma, test for strep pharyngitis, throat culture, ASO titer, mycoplasma IgM. I asked about starting antibiotics due to leukocytosis, and he said that he would not start antibiotics at this time. I reported that blood cultures were drawn x2. [MS]      ED Course User Index  [MS] Josephine Valle MD       CONSULTS:  IP CONSULT TO INTERNAL MEDICINE    PROCEDURES:  Unless otherwise noted below, none     Procedures      Summation    Eren Meeks is a 24 y.o. male who has a history of  depression, migraines, panic attacks, IBS, presented with polyarthralgias and possible polymyalgias likely reactive to a viral infection. No evidence of rhabdomyolysis. Sepsis considered but not likely however blood cultures have been drawn and are pending. Septic joints considered but not likely in absence of history making him higher risk and appropriate clinical findings. Anxiety considered but also deemed unlikely. Acute Covid infection was highly suspected but Covid negative x2 this week. Repeat Covid test planned for tomorrow. No history of IV drug use, murmurs to suggest endocarditis. Infectious disease was consulted at [facility]. Bobby[facility] and this case discussed with him.   He believes his case is most consistent with a post viral syndrome or reactive arthritis and work-up is pending. He recommended NOT starting antibiotics at this time. Because of patient's presentation he was watched carefully in the ED, treated for pain and evaluated thoroughly with recommendations added from ID consultation. He is improved, well hydrated, nontoxic, hemodynamically stable, neurologically intact, and satisfactory for discharge for outpatient management. Findings discussed at length with patient. I gave him ample opportunity to ask questions. .  Plan is to repeat Covid test in 1 day using a home-going test.  Percocet and muscle relaxants were provided to help with pain control and muscle spasms. The patient was evaluated during the global COVID-19  pandemic, and that diagnosis was suspected/considered upon their initial presentation. Their evaluation, treatment and testing was consistent with current guidelines for patients who present with complaints or symptoms that may be related to COVID-19 . The patient met criteria for COVID-19 testing per current protocol. COVID-19 negative. I instructed the patient to followup with the PCP for evaluation of response to management of acute illness in 2 days. I instructed the patient to return to the ER in 1 day if his condition continues or worsens , if there is any concern for altered mental status, difficulty breathing, dehydration or loss of function.         ED Medicationsadministered this visit:    Medications   ketorolac (TORADOL) injection 30 mg (30 mg IntraVENous Given 9/10/21 1539)   0.9 % sodium chloride bolus (0 mLs IntraVENous Stopped 9/10/21 1639)   methylPREDNISolone sodium (SOLU-MEDROL) injection 80 mg (80 mg IntraVENous Given 9/10/21 1737)   iopamidol (ISOVUE-370) 76 % injection 100 mL (100 mLs IntraVENous Given 9/10/21 1814)   morphine sulfate (PF) injection 4 mg (4 mg IntraVENous Given 9/10/21 1825)   ondansetron (ZOFRAN) injection 4 mg (4 mg IntraVENous Given 9/10/21 1825)   cyclobenzaprine (FLEXERIL) tablet 10 mg (10 mg Oral Given 9/10/21 1923)   oxyCODONE-acetaminophen (PERCOCET) 5-325 MG per tablet 1 tablet (1 tablet Oral Given 9/10/21 2100)       New Prescriptions from this visit:    Discharge Medication List as of 9/10/2021  9:46 PM      START taking these medications    Details   oxyCODONE-acetaminophen (PERCOCET) 5-325 MG per tablet Take 1 tablet by mouth every 6 hours as needed for Pain for up to 3 days. Intended supply: 3 days. Take lowest dose possible to manage pain, Disp-10 tablet, R-0Print      cyclobenzaprine (FLEXERIL) 10 MG tablet Take 1 tablet by mouth 3 times daily as needed for Muscle spasms Caution may cause drowsiness. Do not drive or operate machinery while taking., Disp-30 tablet, R-0Normal             Follow-up:  [facility] ED  [address] [address]  [phone]  Go to   As needed, If symptoms worsen    [address] [place] [address],   [address] [facility], [address]  [phone]    Schedule an appointment as soon as possible for a visit in 2 days          Final Impression:   1. Polyarthralgia    2. Reactive arthropathy, unspecified ([address]               (Please note that portions of this note werecompleted with a voice recognition program.  Efforts were made to edit the dictations but occasionally words are mis-transcribed.)    FINAL IMPRESSION      1. Polyarthralgia    2.  Reactive arthropathy, unspecified [facility])          DISPOSITION/PLAN   DISPOSITION        PATIENT REFERRED TO:  [facility] ED  [address] [address]  [phone]  Go to   As needed, If symptoms worsen    [address] [facility], DO  [address]  Amanda Calabrese    Schedule an appointment as soon as possible for a visit in 2 days        DISCHARGE MEDICATIONS:  Discharge Medication List as of 9/10/2021  9:46 PM      START taking these medications    Details   oxyCODONE-acetaminophen (PERCOCET) 5-325 MG per tablet Take 1 tablet by mouth every 6 hours as needed for Pain for up to 3 days. Intended supply: 3 days. Take lowest dose possible to manage pain, Disp-10 tablet, R-0Print      cyclobenzaprine (FLEXERIL) 10 MG tablet Take 1 tablet by mouth 3 times daily as needed for Muscle spasms Caution may cause drowsiness. Do not drive or operate machinery while taking., Disp-30 tablet, R-0Normal           Periodic Controlled Substance Monitoring: Possible medication side effects, risk of tolerance/dependence & alternative treatments discussed., No signs of potential drug abuse or diversion identified.  Margot Oviedo MD)    (Please note that portions of this note were completed with a voice recognition program.  Efforts were made to edit the dictations but occasionally words are mis-transcribed.)    Margot Oviedo MD (electronically signed)  Attending Emergency Physician            Margot Oviedo MD  09/12/21 6782

## 2021-09-11 ENCOUNTER — APPOINTMENT (OUTPATIENT)
Dept: CT IMAGING | Age: 21
DRG: 872 | End: 2021-09-11
Payer: COMMERCIAL

## 2021-09-11 ENCOUNTER — APPOINTMENT (OUTPATIENT)
Dept: GENERAL RADIOLOGY | Age: 21
DRG: 872 | End: 2021-09-11
Payer: COMMERCIAL

## 2021-09-11 ENCOUNTER — HOSPITAL ENCOUNTER (INPATIENT)
Age: 21
LOS: 1 days | Discharge: HOME | DRG: 872 | End: 2021-09-12
Attending: FAMILY MEDICINE | Admitting: INTERNAL MEDICINE
Payer: COMMERCIAL

## 2021-09-11 DIAGNOSIS — R78.81 BACTEREMIA: Primary | ICD-10-CM

## 2021-09-11 LAB
ABSOLUTE BANDS #: 1.58 K/UL (ref 0–1)
ABSOLUTE EOS #: ABNORMAL K/UL (ref 0–0.4)
ABSOLUTE IMMATURE GRANULOCYTE: ABNORMAL K/UL (ref 0–0.3)
ABSOLUTE LYMPH #: 2.11 K/UL (ref 1–4.8)
ABSOLUTE MONO #: 0.79 K/UL (ref 0–1)
ALBUMIN SERPL-MCNC: 4 G/DL (ref 3.5–5.2)
ALBUMIN/GLOBULIN RATIO: ABNORMAL (ref 1–2.5)
ALP BLD-CCNC: 110 U/L (ref 40–129)
ALT SERPL-CCNC: 37 U/L (ref 5–41)
ANION GAP SERPL CALCULATED.3IONS-SCNC: 15 MMOL/L (ref 9–17)
ANTISTREPTOLYSIN-O: <20 IU/ML (ref 0–200)
AST SERPL-CCNC: 19 U/L
BANDS: 6 % (ref 0–10)
BASOPHILS # BLD: ABNORMAL % (ref 0–2)
BASOPHILS ABSOLUTE: ABNORMAL K/UL (ref 0–0.2)
BILIRUB SERPL-MCNC: 0.29 MG/DL (ref 0.3–1.2)
BUN BLDV-MCNC: 23 MG/DL (ref 6–20)
BUN/CREAT BLD: 23 (ref 9–20)
CALCIUM SERPL-MCNC: 9.6 MG/DL (ref 8.6–10.4)
CHLORIDE BLD-SCNC: 100 MMOL/L (ref 98–107)
CO2: 23 MMOL/L (ref 20–31)
CREAT SERPL-MCNC: 1 MG/DL (ref 0.7–1.2)
DIFFERENTIAL TYPE: ABNORMAL
EOSINOPHILS RELATIVE PERCENT: ABNORMAL % (ref 0–5)
GFR AFRICAN AMERICAN: >60 ML/MIN
GFR NON-AFRICAN AMERICAN: >60 ML/MIN
GFR SERPL CREATININE-BSD FRML MDRD: ABNORMAL ML/MIN/{1.73_M2}
GFR SERPL CREATININE-BSD FRML MDRD: ABNORMAL ML/MIN/{1.73_M2}
GLUCOSE BLD-MCNC: 152 MG/DL (ref 70–99)
HCT VFR BLD CALC: 41.6 % (ref 41–53)
HEMOGLOBIN: 14 G/DL (ref 13.5–17.5)
IMMATURE GRANULOCYTES: ABNORMAL %
LACTIC ACID: 2.5 MMOL/L (ref 0.5–2.2)
LYMPHOCYTES # BLD: 8 % (ref 13–44)
MCH RBC QN AUTO: 28.4 PG (ref 26–34)
MCHC RBC AUTO-ENTMCNC: 33.7 G/DL (ref 31–37)
MCV RBC AUTO: 84.2 FL (ref 80–100)
MONOCYTES # BLD: 3 % (ref 5–9)
MORPHOLOGY: ABNORMAL
MYOGLOBIN: <21 NG/ML (ref 28–72)
NRBC AUTOMATED: ABNORMAL PER 100 WBC
PDW BLD-RTO: 14 % (ref 12.1–15.2)
PLATELET # BLD: 444 K/UL (ref 140–450)
PLATELET ESTIMATE: ABNORMAL
PMV BLD AUTO: ABNORMAL FL (ref 6–12)
POTASSIUM SERPL-SCNC: 4 MMOL/L (ref 3.7–5.3)
RBC # BLD: 4.94 M/UL (ref 4.5–5.9)
RBC # BLD: ABNORMAL 10*6/UL
SEG NEUTROPHILS: 83 % (ref 39–75)
SEGMENTED NEUTROPHILS ABSOLUTE COUNT: 21.92 K/UL (ref 2.1–6.5)
SODIUM BLD-SCNC: 138 MMOL/L (ref 135–144)
TOTAL PROTEIN: 7.2 G/DL (ref 6.4–8.3)
WBC # BLD: 26.4 K/UL (ref 4.5–13.5)
WBC # BLD: ABNORMAL 10*3/UL

## 2021-09-11 PROCEDURE — 85025 COMPLETE CBC W/AUTO DIFF WBC: CPT

## 2021-09-11 PROCEDURE — 83605 ASSAY OF LACTIC ACID: CPT

## 2021-09-11 PROCEDURE — 6360000004 HC RX CONTRAST MEDICATION: Performed by: INTERNAL MEDICINE

## 2021-09-11 PROCEDURE — 94761 N-INVAS EAR/PLS OXIMETRY MLT: CPT

## 2021-09-11 PROCEDURE — 1200000000 HC SEMI PRIVATE

## 2021-09-11 PROCEDURE — 74177 CT ABD & PELVIS W/CONTRAST: CPT

## 2021-09-11 PROCEDURE — 6360000002 HC RX W HCPCS: Performed by: FAMILY MEDICINE

## 2021-09-11 PROCEDURE — 96365 THER/PROPH/DIAG IV INF INIT: CPT

## 2021-09-11 PROCEDURE — 80053 COMPREHEN METABOLIC PANEL: CPT

## 2021-09-11 PROCEDURE — 99284 EMERGENCY DEPT VISIT MOD MDM: CPT

## 2021-09-11 PROCEDURE — 2580000003 HC RX 258: Performed by: FAMILY MEDICINE

## 2021-09-11 PROCEDURE — 6360000002 HC RX W HCPCS: Performed by: INTERNAL MEDICINE

## 2021-09-11 PROCEDURE — 6370000000 HC RX 637 (ALT 250 FOR IP): Performed by: INTERNAL MEDICINE

## 2021-09-11 RX ORDER — 0.9 % SODIUM CHLORIDE 0.9 %
1000 INTRAVENOUS SOLUTION INTRAVENOUS ONCE
Status: COMPLETED | OUTPATIENT
Start: 2021-09-11 | End: 2021-09-11

## 2021-09-11 RX ORDER — SODIUM CHLORIDE 0.9 % (FLUSH) 0.9 %
5-40 SYRINGE (ML) INJECTION PRN
Status: DISCONTINUED | OUTPATIENT
Start: 2021-09-11 | End: 2021-09-13 | Stop reason: HOSPADM

## 2021-09-11 RX ORDER — ACETAMINOPHEN 650 MG/1
650 SUPPOSITORY RECTAL EVERY 6 HOURS PRN
Status: DISCONTINUED | OUTPATIENT
Start: 2021-09-11 | End: 2021-09-13 | Stop reason: HOSPADM

## 2021-09-11 RX ORDER — CYCLOBENZAPRINE HCL 10 MG
10 TABLET ORAL 3 TIMES DAILY PRN
Status: DISCONTINUED | OUTPATIENT
Start: 2021-09-11 | End: 2021-09-13 | Stop reason: HOSPADM

## 2021-09-11 RX ORDER — ACETAMINOPHEN 325 MG/1
650 TABLET ORAL EVERY 6 HOURS PRN
Status: DISCONTINUED | OUTPATIENT
Start: 2021-09-11 | End: 2021-09-13 | Stop reason: HOSPADM

## 2021-09-11 RX ORDER — SODIUM CHLORIDE 9 MG/ML
25 INJECTION, SOLUTION INTRAVENOUS PRN
Status: DISCONTINUED | OUTPATIENT
Start: 2021-09-11 | End: 2021-09-13 | Stop reason: HOSPADM

## 2021-09-11 RX ORDER — POLYETHYLENE GLYCOL 3350 17 G/17G
17 POWDER, FOR SOLUTION ORAL DAILY PRN
Status: DISCONTINUED | OUTPATIENT
Start: 2021-09-11 | End: 2021-09-13 | Stop reason: HOSPADM

## 2021-09-11 RX ORDER — ONDANSETRON 4 MG/1
4 TABLET, ORALLY DISINTEGRATING ORAL EVERY 8 HOURS PRN
Status: DISCONTINUED | OUTPATIENT
Start: 2021-09-11 | End: 2021-09-13 | Stop reason: HOSPADM

## 2021-09-11 RX ORDER — ONDANSETRON 2 MG/ML
4 INJECTION INTRAMUSCULAR; INTRAVENOUS EVERY 6 HOURS PRN
Status: DISCONTINUED | OUTPATIENT
Start: 2021-09-11 | End: 2021-09-13 | Stop reason: HOSPADM

## 2021-09-11 RX ORDER — SODIUM CHLORIDE 0.9 % (FLUSH) 0.9 %
5-40 SYRINGE (ML) INJECTION EVERY 12 HOURS SCHEDULED
Status: DISCONTINUED | OUTPATIENT
Start: 2021-09-11 | End: 2021-09-13 | Stop reason: HOSPADM

## 2021-09-11 RX ORDER — OXYCODONE HYDROCHLORIDE AND ACETAMINOPHEN 5; 325 MG/1; MG/1
1 TABLET ORAL EVERY 6 HOURS PRN
Status: DISCONTINUED | OUTPATIENT
Start: 2021-09-11 | End: 2021-09-13 | Stop reason: HOSPADM

## 2021-09-11 RX ADMIN — OXYCODONE HYDROCHLORIDE AND ACETAMINOPHEN 1 TABLET: 5; 325 TABLET ORAL at 20:50

## 2021-09-11 RX ADMIN — ENOXAPARIN SODIUM 40 MG: 40 INJECTION SUBCUTANEOUS at 18:49

## 2021-09-11 RX ADMIN — SODIUM CHLORIDE 1000 ML: 9 INJECTION, SOLUTION INTRAVENOUS at 15:41

## 2021-09-11 RX ADMIN — PIPERACILLIN SODIUM AND TAZOBACTAM SODIUM 4500 MG: 4; .5 INJECTION, POWDER, LYOPHILIZED, FOR SOLUTION INTRAVENOUS at 15:42

## 2021-09-11 RX ADMIN — IOPAMIDOL 75 ML: 755 INJECTION, SOLUTION INTRAVENOUS at 22:15

## 2021-09-11 RX ADMIN — VANCOMYCIN HYDROCHLORIDE 2000 MG: 1 INJECTION, POWDER, LYOPHILIZED, FOR SOLUTION INTRAVENOUS at 16:18

## 2021-09-11 ASSESSMENT — PAIN DESCRIPTION - LOCATION
LOCATION: ANKLE;KNEE
LOCATION: KNEE;ANKLE

## 2021-09-11 ASSESSMENT — PAIN DESCRIPTION - PAIN TYPE
TYPE: ACUTE PAIN
TYPE: ACUTE PAIN

## 2021-09-11 ASSESSMENT — PAIN DESCRIPTION - ONSET
ONSET: ON-GOING
ONSET: ON-GOING

## 2021-09-11 ASSESSMENT — PAIN SCALES - GENERAL
PAINLEVEL_OUTOF10: 6
PAINLEVEL_OUTOF10: 4
PAINLEVEL_OUTOF10: 6

## 2021-09-11 ASSESSMENT — PAIN DESCRIPTION - DESCRIPTORS
DESCRIPTORS: ACHING;CONSTANT
DESCRIPTORS: ACHING;CONSTANT

## 2021-09-11 ASSESSMENT — PAIN DESCRIPTION - PROGRESSION: CLINICAL_PROGRESSION: GRADUALLY IMPROVING

## 2021-09-11 ASSESSMENT — PAIN DESCRIPTION - ORIENTATION
ORIENTATION: RIGHT;LEFT
ORIENTATION: LEFT;RIGHT

## 2021-09-11 ASSESSMENT — PAIN - FUNCTIONAL ASSESSMENT
PAIN_FUNCTIONAL_ASSESSMENT: PREVENTS OR INTERFERES SOME ACTIVE ACTIVITIES AND ADLS
PAIN_FUNCTIONAL_ASSESSMENT: PREVENTS OR INTERFERES SOME ACTIVE ACTIVITIES AND ADLS

## 2021-09-11 ASSESSMENT — PAIN DESCRIPTION - FREQUENCY
FREQUENCY: CONTINUOUS
FREQUENCY: CONTINUOUS

## 2021-09-11 NOTE — ED NOTES
Patient updated that Dr. Mumtaz Martinez is talking with Infectious disease from [facility]. V's.       Carleen Pallas, LUPE  09/11/21 7076

## 2021-09-12 VITALS
TEMPERATURE: 98.9 F | HEART RATE: 100 BPM | SYSTOLIC BLOOD PRESSURE: 145 MMHG | BODY MASS INDEX: 32.64 KG/M2 | WEIGHT: 240.96 LBS | OXYGEN SATURATION: 100 % | HEIGHT: 72 IN | RESPIRATION RATE: 18 BRPM | DIASTOLIC BLOOD PRESSURE: 98 MMHG

## 2021-09-12 LAB
ABSOLUTE EOS #: 0.1 K/UL (ref 0–0.4)
ABSOLUTE IMMATURE GRANULOCYTE: ABNORMAL K/UL (ref 0–0.3)
ABSOLUTE LYMPH #: 3.4 K/UL (ref 1–4.8)
ABSOLUTE MONO #: 0.9 K/UL (ref 0–1)
ANION GAP SERPL CALCULATED.3IONS-SCNC: 11 MMOL/L (ref 9–17)
BASOPHILS # BLD: 0 % (ref 0–2)
BASOPHILS ABSOLUTE: 0 K/UL (ref 0–0.2)
BUN BLDV-MCNC: 22 MG/DL (ref 6–20)
BUN/CREAT BLD: 22 (ref 9–20)
CALCIUM SERPL-MCNC: 9 MG/DL (ref 8.6–10.4)
CHLORIDE BLD-SCNC: 101 MMOL/L (ref 98–107)
CO2: 27 MMOL/L (ref 20–31)
CREAT SERPL-MCNC: 1 MG/DL (ref 0.7–1.2)
DIFFERENTIAL TYPE: YES
EKG ATRIAL RATE: 108 BPM
EKG P AXIS: 25 DEGREES
EKG P-R INTERVAL: 124 MS
EKG Q-T INTERVAL: 320 MS
EKG QRS DURATION: 82 MS
EKG QTC CALCULATION (BAZETT): 428 MS
EKG R AXIS: 39 DEGREES
EKG T AXIS: 43 DEGREES
EKG VENTRICULAR RATE: 108 BPM
EOSINOPHILS RELATIVE PERCENT: 1 % (ref 0–5)
GFR AFRICAN AMERICAN: >60 ML/MIN
GFR NON-AFRICAN AMERICAN: >60 ML/MIN
GFR SERPL CREATININE-BSD FRML MDRD: ABNORMAL ML/MIN/{1.73_M2}
GFR SERPL CREATININE-BSD FRML MDRD: ABNORMAL ML/MIN/{1.73_M2}
GLUCOSE BLD-MCNC: 103 MG/DL (ref 70–99)
HCT VFR BLD CALC: 40.1 % (ref 41–53)
HEMOGLOBIN: 13.2 G/DL (ref 13.5–17.5)
IMMATURE GRANULOCYTES: ABNORMAL %
LACTIC ACID, WHOLE BLOOD: NORMAL MMOL/L (ref 0.7–2.1)
LACTIC ACID: 1.8 MMOL/L (ref 0.5–2.2)
LYMPHOCYTES # BLD: 18 % (ref 13–44)
MCH RBC QN AUTO: 28 PG (ref 26–34)
MCHC RBC AUTO-ENTMCNC: 33 G/DL (ref 31–37)
MCV RBC AUTO: 85 FL (ref 80–100)
MONOCYTES # BLD: 5 % (ref 5–9)
NRBC AUTOMATED: ABNORMAL PER 100 WBC
PDW BLD-RTO: 14.3 % (ref 12.1–15.2)
PLATELET # BLD: 382 K/UL (ref 140–450)
PLATELET ESTIMATE: ABNORMAL
PMV BLD AUTO: ABNORMAL FL (ref 6–12)
POTASSIUM SERPL-SCNC: 4.4 MMOL/L (ref 3.7–5.3)
RBC # BLD: 4.72 M/UL (ref 4.5–5.9)
RBC # BLD: ABNORMAL 10*6/UL
SEG NEUTROPHILS: 76 % (ref 39–75)
SEGMENTED NEUTROPHILS ABSOLUTE COUNT: 14.7 K/UL (ref 2.1–6.5)
SODIUM BLD-SCNC: 139 MMOL/L (ref 135–144)
WBC # BLD: 19.2 K/UL (ref 4.5–13.5)
WBC # BLD: ABNORMAL 10*3/UL

## 2021-09-12 PROCEDURE — 2580000003 HC RX 258: Performed by: INTERNAL MEDICINE

## 2021-09-12 PROCEDURE — 85025 COMPLETE CBC W/AUTO DIFF WBC: CPT

## 2021-09-12 PROCEDURE — 6360000002 HC RX W HCPCS: Performed by: INTERNAL MEDICINE

## 2021-09-12 PROCEDURE — 36415 COLL VENOUS BLD VENIPUNCTURE: CPT

## 2021-09-12 PROCEDURE — 6370000000 HC RX 637 (ALT 250 FOR IP): Performed by: INTERNAL MEDICINE

## 2021-09-12 PROCEDURE — 93010 ELECTROCARDIOGRAM REPORT: CPT | Performed by: INTERNAL MEDICINE

## 2021-09-12 PROCEDURE — 80048 BASIC METABOLIC PNL TOTAL CA: CPT

## 2021-09-12 PROCEDURE — 94761 N-INVAS EAR/PLS OXIMETRY MLT: CPT

## 2021-09-12 PROCEDURE — 83605 ASSAY OF LACTIC ACID: CPT

## 2021-09-12 RX ORDER — VANCOMYCIN HYDROCHLORIDE 1 G/20ML
INJECTION, POWDER, LYOPHILIZED, FOR SOLUTION INTRAVENOUS
Status: DISPENSED
Start: 2021-09-12 | End: 2021-09-12

## 2021-09-12 RX ORDER — VANCOMYCIN HYDROCHLORIDE 1 G/20ML
INJECTION, POWDER, LYOPHILIZED, FOR SOLUTION INTRAVENOUS
Status: DISCONTINUED
Start: 2021-09-12 | End: 2021-09-13 | Stop reason: HOSPADM

## 2021-09-12 RX ORDER — VANCOMYCIN HYDROCHLORIDE 500 MG/10ML
INJECTION, POWDER, LYOPHILIZED, FOR SOLUTION INTRAVENOUS
Status: DISPENSED
Start: 2021-09-12 | End: 2021-09-12

## 2021-09-12 RX ORDER — VANCOMYCIN HYDROCHLORIDE 500 MG/10ML
INJECTION, POWDER, LYOPHILIZED, FOR SOLUTION INTRAVENOUS
Status: DISCONTINUED
Start: 2021-09-12 | End: 2021-09-13 | Stop reason: HOSPADM

## 2021-09-12 RX ADMIN — ENOXAPARIN SODIUM 40 MG: 40 INJECTION SUBCUTANEOUS at 08:43

## 2021-09-12 RX ADMIN — VANCOMYCIN HYDROCHLORIDE 1500 MG: 500 INJECTION, POWDER, LYOPHILIZED, FOR SOLUTION INTRAVENOUS at 16:04

## 2021-09-12 RX ADMIN — POLYETHYLENE GLYCOL 3350 17 G: 17 POWDER, FOR SOLUTION ORAL at 08:47

## 2021-09-12 RX ADMIN — VANCOMYCIN HYDROCHLORIDE 1500 MG: 500 INJECTION, POWDER, LYOPHILIZED, FOR SOLUTION INTRAVENOUS at 04:49

## 2021-09-12 RX ADMIN — SODIUM CHLORIDE, PRESERVATIVE FREE 10 ML: 5 INJECTION INTRAVENOUS at 08:44

## 2021-09-12 RX ADMIN — SODIUM CHLORIDE, PRESERVATIVE FREE 10 ML: 5 INJECTION INTRAVENOUS at 16:05

## 2021-09-12 ASSESSMENT — PAIN SCALES - GENERAL
PAINLEVEL_OUTOF10: 0
PAINLEVEL_OUTOF10: 0
PAINLEVEL_OUTOF10: 3
PAINLEVEL_OUTOF10: 0

## 2021-09-12 ASSESSMENT — PAIN DESCRIPTION - DESCRIPTORS: DESCRIPTORS: ACHING

## 2021-09-12 ASSESSMENT — PAIN DESCRIPTION - LOCATION: LOCATION: KNEE

## 2021-09-12 ASSESSMENT — PAIN DESCRIPTION - PAIN TYPE: TYPE: ACUTE PAIN

## 2021-09-12 ASSESSMENT — PAIN DESCRIPTION - ORIENTATION: ORIENTATION: RIGHT;LEFT

## 2021-09-12 ASSESSMENT — PAIN DESCRIPTION - ONSET: ONSET: PROGRESSIVE

## 2021-09-12 NOTE — H&P
History & Physical    Patient:  Sabas Rogel  YOB: 2000  Date of Service: 9/12/2021  MRN: [id number]   Acct:   [de-identified]   Primary Care Physician: Crystal Hendricks DO    Chief Complaint:   Chief Complaint   Patient presents with    Blood Infection     pt was scene in ED yesterday and today his blood cultures are positive        History of Present Illness: The patient is a 24 y.o. male generally healthy, presented to the emergency room for evaluation of positive blood cultures. The patient initially came to the emergency room on 9/4/2021 complaining of fever of 102 at home, chills, body aches, congestion, cough, chest tightness. He did not receive Covid vaccine. He was concerned about Covid infection. Patient's work-up in the emergency room revealed negative Covid test.  He was not hypoxic. Chest x-ray revealed no active disease in the chest.  He was discharged home with Decadron and cough syrup. Patient states initially he felt better, however developed severe pain in lower extremities, joints, felt short of breath again with associated chest tightness and came back to the emergency room on 9/10. He had no fever, he was tachycardic with heart rate 115. He was not hypoxic. He did have elevated WBC count 16.7. His BMP, LFTs were normal.  Myoglobin level was less than 21. He was retested for Covid and it was negative again. Patient underwent CTA chest to rule out PE. CTA was negative for any acute findings. Patient was initially going to be transferred to higher level care facility due to severe pain in joints and leukocytosis. He had 2 blood cultures drawn in ER. He was treated with IV Solu-Medrol 80 mg x 1, IV Toradol, Flexeril. He felt better and eventually went home with instruction to come back to the emergency room should his condition worsen.   Yesterday 1 out of 2 blood cultures came back preliminary showing gram-positive cocci in clusters and the patient was called and instructed to come back to the emergency room. Patient states that he is feeling better. He does not have fevers, chills anymore. Occasionally has some shortness of breath and chest tightness. Pain in bilateral legs and joints resolved. ER physician discussed patient's case with ID from [facility] and she recommended admitting the patient for empiric IV antibiotics. Past Medical History:        Diagnosis Date    Panic attacks 5/27/2021       Past Surgical History:        Procedure Laterality Date    TONSILLECTOMY         Home Medications:   No current facility-administered medications on file prior to encounter. Current Outpatient Medications on File Prior to Encounter   Medication Sig Dispense Refill    oxyCODONE-acetaminophen (PERCOCET) 5-325 MG per tablet Take 1 tablet by mouth every 6 hours as needed for Pain for up to 3 days. Intended supply: 3 days. Take lowest dose possible to manage pain 10 tablet 0    dexamethasone (DECADRON) 4 MG tablet Take 1 tablet by mouth daily (with breakfast) for 10 days For lung inflammation 10 tablet 0    cyclobenzaprine (FLEXERIL) 10 MG tablet Take 1 tablet by mouth 3 times daily as needed for Muscle spasms Caution may cause drowsiness. Do not drive or operate machinery while taking. 30 tablet 0       Allergies:  Patient has no known allergies. Social History:    reports that he has never smoked. He has never used smokeless tobacco. He reports that he does not drink alcohol and does not use drugs. Family History:   Family history reviewed, noncontributory    Review of systems:  Constitutional: no fever, no night sweats, no fatigue  Head: no headache, no head injury, no migranes. Eye: no blurring of vision, no double vision.   Ears: no hearing difficulty, no tinnitus  Mouth/throat: no ulceration, dental caries, dysphagia  Lungs: no cough, occasional shortness of breath, no wheeze  CVS: no palpitation, no chest pain  GI: no abdominal pain, no nausea , no vomiting, no constipation  FELY: no dysuria, frequency and urgency, no hematuria, no kidney stones  Musculoskeletal: no joint pain, swelling , stiffness  Endocrine: no polyuria, polydypsia, no cold or heat intolerence  Hematology: no anemia, no easy brusing or bleeding, no hx of clotting disorder  Dermatology: no skin rash, no eczema, no prurities,  Psychiatry: no depression, no anxiety, history of panic attacks  Neurology: no syncope, no seizures, no numbness or tingling of hands, no numbness or tingling of feet, no paresis      Vitals:   Vitals:    09/12/21 0900   BP:  131/88   Pulse:  79   Resp:  18   Temp:  97.8   SpO2: 97%      BMI: Body mass index is 32.68 kg/m².     Physical Exam:  General Appearance: alert and oriented to person, place and time, in no acute distress  Cardiovascular: normal rate, regular rhythm, normal S1 and S2, no murmurs, rubs, clicks, or gallops, distal pulses intact  Pulmonary/Chest: clear to auscultation bilaterally- no wheezes, rales or rhonchi, normal air movement, no respiratory distress  Abdomen: soft, non-tender, non-distended, normal bowel sounds  Extremities: no cyanosis, clubbing or edema, pulse   Skin: warm and dry, no rash or erythema  Head: normocephalic and atraumatic  Eyes: pupils equal, round, and reactive to light  Neck: supple and non-tender without mass, no thyromegaly   Musculoskeletal: normal range of motion, no joint swelling, deformity or tenderness  Neurological: alert, oriented, normal speech, no focal findings or movement disorder noted    Review of Labs and Diagnostic Testing:    Recent Results (from the past 24 hour(s))   Lactic Acid    Collection Time: 09/11/21  3:00 PM   Result Value Ref Range    Lactic Acid 2.5 (H) 0.5 - 2.2 mmol/L   CBC Auto Differential    Collection Time: 09/11/21  3:00 PM   Result Value Ref Range    WBC 26.4 (HH) 4.5 - 13.5 k/uL    RBC 4.94 4.5 - 5.9 m/uL    Hemoglobin 14.0 13.5 - 17.5 g/dL    Hematocrit 41.6 41 - 53 % MCV 84.2 80 - 100 fL    MCH 28.4 26 - 34 pg    MCHC 33.7 31 - 37 g/dL    RDW 14.0 12.1 - 15.2 %    Platelets 846 950 - 015 k/uL    MPV NOT REPORTED 6.0 - 12.0 fL    NRBC Automated NOT REPORTED per 100 WBC    Differential Type NOT REPORTED     Immature Granulocytes NOT REPORTED 0 %    Absolute Immature Granulocyte NOT REPORTED 0.00 - 0.30 k/uL    WBC Morphology NOT REPORTED     RBC Morphology NOT REPORTED     Platelet Estimate NOT REPORTED     Seg Neutrophils 83 (H) 39 - 75 %    Lymphocytes 8 (L) 13 - 44 %    Monocytes 3 (L) 5 - 9 %    Eosinophils %      0 - 5 %    Basophils      0 - 2 %    Bands 6 0 - 10 %    Segs Absolute 21.92 (H) 2.1 - 6.5 k/uL    Absolute Lymph # 2.11 1.0 - 4.8 k/uL    Absolute Mono # 0.79 0.0 - 1.0 k/uL    Absolute Eos #      0.0 - 0.4 k/uL    Basophils Absolute      0.0 - 0.2 k/uL    Absolute Bands # 1.58 (H) 0.0 - 1.0 k/uL    Morphology Manual Differential Performed    Comprehensive Metabolic Panel    Collection Time: 09/11/21  3:00 PM   Result Value Ref Range    Glucose 152 (H) 70 - 99 mg/dL    BUN 23 (H) 6 - 20 mg/dL    CREATININE 1.00 0.70 - 1.20 mg/dL    Bun/Cre Ratio 23 (H) 9 - 20    Calcium 9.6 8.6 - 10.4 mg/dL    Sodium 138 135 - 144 mmol/L    Potassium 4.0 3.7 - 5.3 mmol/L    Chloride 100 98 - 107 mmol/L    CO2 23 20 - 31 mmol/L    Anion Gap 15 9 - 17 mmol/L    Alkaline Phosphatase 110 40 - 129 U/L    ALT 37 5 - 41 U/L    AST 19 <40 U/L    Total Bilirubin 0.29 (L) 0.30 - 1.20 mg/dL    Total Protein 7.2 6.4 - 8.3 g/dL    Albumin 4.0 3.5 - 5.2 g/dL    Albumin/Globulin Ratio NOT REPORTED 1.0 - 2.5    GFR Non-African American >60 >60 mL/min    GFR African American >60 >60 mL/min    GFR Comment          GFR Staging NOT REPORTED    CBC Auto Differential    Collection Time: 09/12/21  5:13 AM   Result Value Ref Range    WBC 19.2 (H) 4.5 - 13.5 k/uL    RBC 4.72 4.5 - 5.9 m/uL    Hemoglobin 13.2 (L) 13.5 - 17.5 g/dL    Hematocrit 40.1 (L) 41 - 53 %    MCV 85.0 80 - 100 fL    MCH 28.0 26 - 34 pg MCHC 33.0 31 - 37 g/dL    RDW 14.3 12.1 - 15.2 %    Platelets 364 370 - 809 k/uL    MPV NOT REPORTED 6.0 - 12.0 fL    NRBC Automated NOT REPORTED per 100 WBC    Differential Type YES     Seg Neutrophils 76 (H) 39 - 75 %    Lymphocytes 18 13 - 44 %    Monocytes 5 5 - 9 %    Eosinophils % 1 0 - 5 %    Basophils 0 0 - 2 %    Immature Granulocytes NOT REPORTED 0 %    Segs Absolute 14.70 (H) 2.1 - 6.5 k/uL    Absolute Lymph # 3.40 1.0 - 4.8 k/uL    Absolute Mono # 0.90 0.0 - 1.0 k/uL    Absolute Eos # 0.10 0.0 - 0.4 k/uL    Basophils Absolute 0.00 0.0 - 0.2 k/uL    Absolute Immature Granulocyte NOT REPORTED 0.00 - 0.30 k/uL    WBC Morphology NOT REPORTED     RBC Morphology NOT REPORTED     Platelet Estimate NOT REPORTED    Basic Metabolic Panel    Collection Time: 09/12/21  5:13 AM   Result Value Ref Range    Glucose 103 (H) 70 - 99 mg/dL    BUN 22 (H) 6 - 20 mg/dL    CREATININE 1.00 0.70 - 1.20 mg/dL    Bun/Cre Ratio 22 (H) 9 - 20    Calcium 9.0 8.6 - 10.4 mg/dL    Sodium 139 135 - 144 mmol/L    Potassium 4.4 3.7 - 5.3 mmol/L    Chloride 101 98 - 107 mmol/L    CO2 27 20 - 31 mmol/L    Anion Gap 11 9 - 17 mmol/L    GFR Non-African American >60 >60 mL/min    GFR African American >60 >60 mL/min    GFR Comment          GFR Staging NOT REPORTED    Lactic Acid, Plasma    Collection Time: 09/12/21  5:22 AM   Result Value Ref Range    Lactic Acid 1.8 0.5 - 2.2 mmol/L    Lactic Acid, Whole Blood NOT REPORTED 0.7 - 2.1 mmol/L       Radiology:     CT ABDOMEN PELVIS W IV CONTRAST Additional Contrast? None    Result Date: 9/12/2021  EXAM: CT abdomen and pelvis with contrast dated 9/11/2021 10:15 PM EDT HISTORY: 35-year-old male with abdominal pain with leukocytosis. COMPARISON STUDY: 3/4/2013 without contrast. TECHNIQUE: Multidetector spiral CT of the abdomen and pelvis was performed from lung bases to pubic symphysis. 75 mL Isovue-370 intravenous contrast was administered during this examination.  Portal venous imaging was space height loss and slight posterior calcification and relative narrowing of the central canal on image 32 of series 2. There is gas noted within the subcutaneous tissues on image 89 of series 2 overlying the left ventral abdominal wall which may represent the sequela of injection. No acute abdominal or pelvic finding. Large volume of stool in the colon consistent with constipation. Mild hepatic steatosis. Assessment/ Plan:    1. Bacteremia - 1 out of 2 blood cultures from 9/10 came back positive for gram-positive cocci in clusters. Final identification pending. Patient has no symptoms. He did have leukocytosis, however he has been taking steroids since 9/4 and leukocytosis could be steroid induced. His vitals are stable, he is afebrile. It is believed that blood culture is most likely contaminant specimen. We will continue on IV vancomycin until final result is available  2. Leukocytosis -believed to be steroid-induced, resolving after stopping steroids  3. Recent viral illness -COVID-19 came back negative x2. However, it is possible the test was false negative. Patient advised to follow-up with PCP and have Covid antibodies checked in 2-3 weeks        Medical Necessity: Inpatient admission is appropriate for this patient secondary to the need of IV antibiotics    Estimated length of stay: 2 days. The beneficiary may reasonably be expected to be discharged or transferred to a hospital within 96 hours after admission.     DVT prophylaxis:   [] Lovenox   [] SCDs   [] SQ Heparin   [] Encourage ambulation, low risk for DVT, no chemical or mechanical    prophylaxis necessary      [] Already on Anticoagulation    Anticipated Disposition upon discharge:   [] Home   [] Home with Home Health   [] [facility]   [] [facility] [address]      Electronically signed by Halle Ta MD on 9/12/2021 at 11:07 AM

## 2021-09-12 NOTE — PROGRESS NOTES
Patient c/o 6/10 constant aching pain to bilateral knees and ankles. Head to toe assessment complete. Patient with hypoactive bowel sounds. Patient denies passing flatus. Dr. Jonelle Ness notified. New order obtained for CT abdomen. Patient denies abdominal pain, reports tightness.

## 2021-09-12 NOTE — ED PROVIDER NOTES
eMERGENCY dEPARTMENT eNCOUnter      [address] [facility]    Chief Complaint   Patient presents with    Blood Infection     pt was scene in ED yesterday and today his blood cultures are positive        HPI    Moisés Loo is a 24 y.o. male who presents to the ER after blood cultures revealed gram positive cocci in clusters. Patient was seen in the ER yesterday for polyarthralgia. Patient's arthralgia has improved. PAST MEDICAL HISTORY    Past Medical History:   Diagnosis Date    Panic attacks 5/27/2021       SURGICAL HISTORY    Past Surgical History:   Procedure Laterality Date    TONSILLECTOMY         CURRENT MEDICATIONS        ALLERGIES    No Known Allergies    FAMILY HISTORY    History reviewed. No pertinent family history. SOCIAL HISTORY    Social History     Socioeconomic History    Marital status: Single     Spouse name: None    Number of children: None    Years of education: None    Highest education level: None   Occupational History    None   Tobacco Use    Smoking status: Never Smoker    Smokeless tobacco: Never Used   Vaping Use    Vaping Use: Every day    Substances: Never   Substance and Sexual Activity    Alcohol use: No    Drug use: No    Sexual activity: None   Other Topics Concern    None   Social History Narrative    None     Social Determinants of Health     Financial Resource Strain: Low Risk     Difficulty of Paying Living Expenses: Not hard at all   Food Insecurity: No Food Insecurity    Worried About Running Out of Food in the Last Year: Never true    Latoya of Food in the Last Year: Never true   Transportation Needs:     Lack of Transportation (Medical):      Lack of Transportation (Non-Medical):    Physical Activity:     Days of Exercise per Week:     Minutes of Exercise per Session:    Stress:     Feeling of Stress :    Social Connections:     Frequency of Communication with Friends and Family:     Frequency of Social Gatherings with Friends and Family:  Attends Rastafari Services:     Active Member of Clubs or Organizations:     Attends Club or Organization Meetings:     Marital Status:    Intimate Partner Violence:     Fear of Current or Ex-Partner:     Emotionally Abused:     Physically Abused:     Sexually Abused:        REVIEW OF SYSTEMS    Constitutional:  Denies fever, chills, weight loss. Fatigue present  Eyes:  Denies photophobia or discharge   HENT:  Denies sore throat or ear pain   Respiratory:  Denies cough or shortness of breath   Cardiovascular:  Denies chest pain, palpitations or swelling   GI:  Denies abdominal pain, nausea, vomiting, or diarrhea   Musculoskeletal:  Denies back pain. Pain of lower extremities present. Skin:  Denies rash   Neurologic:  Denies headache, focal weakness or sensory changes   Endocrine:  Denies polyuria or polydypsia   Lymphatic:  Denies swollen glands   Psychiatric:  Denies depression, suicidal ideation or homicidal ideation   All systems negative except as marked. PHYSICAL EXAM    VITAL SIGNS: /80   Pulse 74   Temp 97.9 °F (36.6 °C) (Oral)   Resp 18   Ht 6' (1.829 m)   Wt 240 lb 15.4 oz (109.3 kg)   SpO2 100%   BMI 32.68 kg/m²    Constitutional:  Well developed, Well nourished, mild acute distress, Non-toxic appearance. HENT:  Normocephalic, Atraumatic, Bilateral external ears normal, Oropharynx moist, No oral exudates, Nose normal. Neck- Normal range of motion, No tenderness, Supple, No stridor. Eyes:  PERRL, EOMI, Conjunctiva normal, No discharge. Respiratory:  Normal breath sounds, No respiratory distress, No wheezing, No chest tenderness. Cardiovascular:  Normal heart rate, Normal rhythm, No murmurs, No rubs, No gallops. GI:  Bowel sounds normal, Soft, No tenderness, No masses, No pulsatile masses. : External genitalia appear normal, No masses or lesions. No discharge. No CVA tenderness.    Musculoskeletal:  Intact distal pulses, No edema, No tenderness, No cyanosis, No clubbing. Good range of motion in all major joints. No tenderness to palpation or major deformities noted. Back- No tenderness. Integument:  Warm, Dry, No erythema, No rash. Lymphatic:  No lymphadenopathy noted. Neurologic:  Alert & oriented x 3, Normal motor function, Normal sensory function, No focal deficits noted. Psychiatric:  Affect normal, Judgment normal, Mood normal.     EKG        RADIOLOGY        PROCEDURES        ED COURSE & MEDICAL DECISION MAKING    Pertinent Labs & Imaging studies reviewed. (See chart for details)  Labs Reviewed   LACTIC ACID - Abnormal; Notable for the following components:       Result Value    Lactic Acid 2.5 (*)     All other components within normal limits   CBC WITH AUTO DIFFERENTIAL - Abnormal; Notable for the following components:    WBC 26.4 (*)     Seg Neutrophils 83 (*)     Lymphocytes 8 (*)     Monocytes 3 (*)     Segs Absolute 21.92 (*)     Absolute Bands # 1.58 (*)     All other components within normal limits   COMPREHENSIVE METABOLIC PANEL - Abnormal; Notable for the following components:    Glucose 152 (*)     BUN 23 (*)     Bun/Cre Ratio 23 (*)     Total Bilirubin 0.29 (*)     All other components within normal limits   LACTIC ACID, PLASMA   CBC WITH AUTO DIFFERENTIAL   BASIC METABOLIC PANEL     Patient received IV Zosyn and IV vancomycin in the ER. I spoke with ,Infectious disease specialist, and he recommended admission and treatment with IV Vancomycin. I spoke with Dr. Marielle Larose, and she accepted admission. FINAL IMPRESSION    1.  Bacteremia            Ayde Blancas MD  09/12/21 [id number]

## 2021-09-12 NOTE — PROGRESS NOTES
Pharmacy Note  Vancomycin Consult    Yonatan Yu is a 24 y.o. male started on Vancomycin for bacteremia; consult received from Dr. Jay Elder to manage therapy. Also receiving the following antibiotics: zosyn x 1 dose. Patient Active Problem List   Diagnosis    Current episode of major depressive disorder without prior episode    Migraine with aura and without status migrainosus, not intractable    Adjustment insomnia    Functional diarrhea    Panic attacks    Irritable bowel syndrome with diarrhea    Bacteremia     Allergies:  Patient has no known allergies. Temp max: afebrile    Recent Labs     09/10/21  1530 09/11/21  1500   BUN 14 23*   CREATININE 1.13 1.00   WBC 16.7* 26.4*     No intake or output data in the 24 hours ending 09/11/21 2031  Culture Date      Source                       Results  9/10/21                blood                         GPCs in clusters    Ht Readings from Last 1 Encounters:   09/11/21 6' (1.829 m)        Wt Readings from Last 1 Encounters:   09/11/21 240 lb 12.8 oz (109.2 kg)       Body mass index is 32.66 kg/m². Estimated Creatinine Clearance: 149 mL/min (based on SCr of 1 mg/dL). Goal Trough Level: 15-20 mcg/mL    Assessment/Plan:  Will initiate Vancomycin with a one time loading dose of 2000 mg x1, followed by 1500 mg IV every 12 hours. Estimated steady state trough is 17 mcg/ml with . Timing of trough level will be determined based on culture results, renal function, and clinical response. Thank you for the consult. Will continue to follow. [id number] JED RENTERIA, BCPS  9/11/2021  8:34 PM

## 2021-09-12 NOTE — PROGRESS NOTES
Pt verbalizes that he \"really wants to go home\". Dr. Chris Mckee aware but pt continues to insist he wants to go home. Explained rationale for wanting pt to stay until final BC results. Much reassurance and support given to pt who states he feels better than when he came. Much education provided. Dr. Chris Mckee updated and aware that pt is willing to stay at this point.

## 2021-09-13 ENCOUNTER — TELEPHONE (OUTPATIENT)
Dept: FAMILY MEDICINE CLINIC | Age: 21
End: 2021-09-13

## 2021-09-13 LAB
CULTURE: ABNORMAL
CULTURE: NORMAL
Lab: ABNORMAL
Lab: NORMAL
MYCOPLASMA PNEUMONIAE IGM: 1.32
SPECIMEN DESCRIPTION: ABNORMAL
SPECIMEN DESCRIPTION: NORMAL

## 2021-09-13 NOTE — PROGRESS NOTES
Dr. Sarah Ramirez made aware of patient leaving AMA. Patient reports he is feeling much better and that he will follow up with his PCP as an outpatient. All questions answered. Denies further needs.

## 2021-09-13 NOTE — TELEPHONE ENCOUNTER
[facility] Transitions Initial Follow Up Call    Outreach made within 2 business days of discharge: Yes    Patient: Carole Gamez Patient : 2000   MRN: [id number]  Reason for Admission: There are no discharge diagnoses documented for the most recent discharge. Discharge Date: 21       Spoke with: Corey Funes    Discharge department/facility: Bacteremia  TCM Interactive Patient Contact:  Was patient able to fill all prescriptions: Yes  Was patient instructed to bring all medications to the follow-up visit: Yes  Is patient taking all medications as directed in the discharge summary?  Yes  Does patient understand their discharge instructions: Yes  Does patient have questions or concerns that need addressed prior to 7-14 day follow up office visit: no    Scheduled appointment with PCP within 7-14 days    Follow Up  Future Appointments   Date Time Provider Carmelo Kirby   9/15/2021  9:00 Jael Moore [place], [place]

## 2021-09-13 NOTE — TELEPHONE ENCOUNTER
Patient is scheduled for a hospital follow up on 9/15/21. Patient was discharged on 9/12/21.   DX Bacteremia    Health Maintenance   Topic Date Due    Hepatitis C screen  Never done    HPV vaccine (1 - Male 2-dose series) Never done    COVID-19 Vaccine (1) Never done    HIV screen  Never done    Flu vaccine (1) 09/01/2021    DTaP/Tdap/Td vaccine (7 - Td or Tdap) 08/24/2022    Hepatitis B vaccine  Completed    Hib vaccine  Completed    Varicella vaccine  Completed    Meningococcal (ACWY) vaccine  Completed    Hepatitis A vaccine  Aged Out    Pneumococcal 0-64 years Vaccine  Aged Out             (applicable per patient's age: Cancer Screenings, Depression Screening, Fall Risk Screening, Immunizations)    AST (U/L)   Date Value   09/11/2021 19     ALT (U/L)   Date Value   09/11/2021 37     BUN (mg/dL)   Date Value   09/12/2021 22 (H)      (goal A1C is < 7)   (goal LDL is <100) need 30-50% reduction from baseline     BP Readings from Last 3 Encounters:   09/12/21 (!) 145/98   09/10/21 126/75   09/04/21 (!) 144/91    (goal /80)      All Future Testing planned in [facility]:  Lab Frequency Next Occurrence       Next Visit Date:  Future Appointments   Date Time Provider Carmelo Kirby   9/15/2021  9:00 [facility]            Patient Active Problem List:     Current episode of major depressive disorder without prior episode     Migraine with aura and without status migrainosus, not intractable     Adjustment insomnia     Functional diarrhea     Panic attacks     Irritable bowel syndrome with diarrhea     Bacteremia

## 2021-09-13 NOTE — DISCHARGE SUMMARY
Hospitalist Discharge Summary    Patient:  Marcell Caceres  YOB: 2000    MRN: [id number]   Acct: [de-identified]    Primary Care Physician: Sobia Graham DO    Admit date:  9/11/2021    Discharge date:  9/12/21  PATIENT LEFT AMA      Discharge Diagnoses:     Bacteremia    Discharge Medications:       Cheryl Faes   Home Medication Instructions OKI:[id number]    Printed on:09/13/21 1017   Medication Information                      cyclobenzaprine (FLEXERIL) 10 MG tablet  Take 1 tablet by mouth 3 times daily as needed for Muscle spasms Caution may cause drowsiness. Do not drive or operate machinery while taking. dexamethasone (DECADRON) 4 MG tablet  Take 1 tablet by mouth daily (with breakfast) for 10 days For lung inflammation             oxyCODONE-acetaminophen (PERCOCET) 5-325 MG per tablet  Take 1 tablet by mouth every 6 hours as needed for Pain for up to 3 days. Intended supply: 3 days. Take lowest dose possible to manage pain                    The patient is a 24 y.o. male generally healthy, presented to the emergency room for evaluation of positive blood cultures. The patient initially came to the emergency room on 9/4/2021 complaining of fever of 102 at home, chills, body aches, congestion, cough, chest tightness. He did not receive Covid vaccine. He was concerned about Covid infection. Patient's work-up in the emergency room revealed negative Covid test.  He was not hypoxic. Chest x-ray revealed no active disease in the chest.  He was discharged home with Decadron and cough syrup. Patient states initially he felt better, however developed severe pain in lower extremities, joints, felt short of breath again with associated chest tightness and came back to the emergency room on 9/10. He had no fever, he was tachycardic with heart rate 115. He was not hypoxic. He did have elevated WBC count 16.7. His BMP, LFTs were normal.  Myoglobin level was less than 21.   He was retested for Covid and it was negative again. Patient underwent CTA chest to rule out PE. CTA was negative for any acute findings. Patient was initially going to be transferred to higher level care facility due to severe pain in joints and leukocytosis. He had 2 blood cultures drawn in ER. He was treated with IV Solu-Medrol 80 mg x 1, IV Toradol, Flexeril. He felt better and eventually went home with instruction to come back to the emergency room should his condition worsen. Yesterday 1 out of 2 blood cultures came back preliminary showing gram-positive cocci in clusters and the patient was called and instructed to come back to the emergency room. Patient states that he is feeling better. He does not have fevers, chills anymore. Occasionally has some shortness of breath and chest tightness. Pain in bilateral legs and joints resolved. ER physician discussed patient's case with ID from [facility] and she recommended admitting the patient for empiric IV antibiotics. He was started on IV vancomycin. Later in the evening I was called by RN and informed the patient decided to leave AMA. He reported to the nurse that he was feeling better and was going to follow-up with his PCP for blood culture results. The patient was advised to follow-up with PCP as soon as possible.   Will await final report on blood cultures.             Electronically signed by Tevin Bailon MD on 9/13/2021 at 10:17 AM  Discharging Hospitalist

## 2021-09-15 ENCOUNTER — HOSPITAL ENCOUNTER (OUTPATIENT)
Age: 21
Discharge: HOME | End: 2021-09-15
Payer: COMMERCIAL

## 2021-09-15 ENCOUNTER — OFFICE VISIT (OUTPATIENT)
Dept: FAMILY MEDICINE CLINIC | Age: 21
End: 2021-09-15
Payer: COMMERCIAL

## 2021-09-15 VITALS
RESPIRATION RATE: 20 BRPM | HEART RATE: 98 BPM | HEIGHT: 73 IN | DIASTOLIC BLOOD PRESSURE: 82 MMHG | OXYGEN SATURATION: 100 % | SYSTOLIC BLOOD PRESSURE: 120 MMHG | BODY MASS INDEX: 31.46 KG/M2 | WEIGHT: 237.4 LBS

## 2021-09-15 DIAGNOSIS — Z09 HOSPITAL DISCHARGE FOLLOW-UP: Primary | ICD-10-CM

## 2021-09-15 DIAGNOSIS — R78.81 POSITIVE BLOOD CULTURE: ICD-10-CM

## 2021-09-15 DIAGNOSIS — K59.1 FUNCTIONAL DIARRHEA: ICD-10-CM

## 2021-09-15 DIAGNOSIS — Z09 HOSPITAL DISCHARGE FOLLOW-UP: ICD-10-CM

## 2021-09-15 DIAGNOSIS — F32.1 CURRENT MODERATE EPISODE OF MAJOR DEPRESSIVE DISORDER WITHOUT PRIOR EPISODE (HCC): ICD-10-CM

## 2021-09-15 LAB
ABSOLUTE EOS #: 0.3 K/UL (ref 0–0.4)
ABSOLUTE IMMATURE GRANULOCYTE: ABNORMAL K/UL (ref 0–0.3)
ABSOLUTE LYMPH #: 2.4 K/UL (ref 1–4.8)
ABSOLUTE MONO #: 0.6 K/UL (ref 0–1)
BASOPHILS # BLD: 0 % (ref 0–2)
BASOPHILS ABSOLUTE: 0 K/UL (ref 0–0.2)
DIFFERENTIAL TYPE: YES
EOSINOPHILS RELATIVE PERCENT: 2 % (ref 0–5)
HCT VFR BLD CALC: 43.1 % (ref 41–53)
HEMOGLOBIN: 14.2 G/DL (ref 13.5–17.5)
IMMATURE GRANULOCYTES: ABNORMAL %
LYMPHOCYTES # BLD: 22 % (ref 13–44)
MCH RBC QN AUTO: 27.8 PG (ref 26–34)
MCHC RBC AUTO-ENTMCNC: 33 G/DL (ref 31–37)
MCV RBC AUTO: 84.3 FL (ref 80–100)
MONOCYTES # BLD: 6 % (ref 5–9)
NRBC AUTOMATED: ABNORMAL PER 100 WBC
PDW BLD-RTO: 14.1 % (ref 12.1–15.2)
PLATELET # BLD: 367 K/UL (ref 140–450)
PLATELET ESTIMATE: ABNORMAL
PMV BLD AUTO: ABNORMAL FL (ref 6–12)
RBC # BLD: 5.11 M/UL (ref 4.5–5.9)
RBC # BLD: ABNORMAL 10*6/UL
SEG NEUTROPHILS: 70 % (ref 39–75)
SEGMENTED NEUTROPHILS ABSOLUTE COUNT: 7.7 K/UL (ref 2.1–6.5)
WBC # BLD: 10.9 K/UL (ref 4.5–13.5)
WBC # BLD: ABNORMAL 10*3/UL

## 2021-09-15 PROCEDURE — 99495 TRANSJ CARE MGMT MOD F2F 14D: CPT | Performed by: STUDENT IN AN ORGANIZED HEALTH CARE EDUCATION/TRAINING PROGRAM

## 2021-09-15 PROCEDURE — 85025 COMPLETE CBC W/AUTO DIFF WBC: CPT

## 2021-09-15 PROCEDURE — 1111F DSCHRG MED/CURRENT MED MERGE: CPT | Performed by: STUDENT IN AN ORGANIZED HEALTH CARE EDUCATION/TRAINING PROGRAM

## 2021-09-15 PROCEDURE — 36415 COLL VENOUS BLD VENIPUNCTURE: CPT

## 2021-09-15 PROCEDURE — 87040 BLOOD CULTURE FOR BACTERIA: CPT

## 2021-09-15 ASSESSMENT — ENCOUNTER SYMPTOMS
COUGH: 1
SORE THROAT: 0
WHEEZING: 0
RHINORRHEA: 0
SINUS PRESSURE: 0
SHORTNESS OF BREATH: 1
BACK PAIN: 0
SINUS PAIN: 0
DIARRHEA: 0
NAUSEA: 0
VOMITING: 0
ABDOMINAL PAIN: 0

## 2021-09-15 NOTE — LETTER
[facility] [address] [place] [id number]  Phone: [phone]  Fax: [address], DO        September 15, 2021     Patient: Era Urrutia   YOB: 2000   Date of Visit: 9/15/2021       To Whom It May Concern: It is my medical opinion that [employer] should remain out of work until 9/20/21. please excuse any missed time in between 9/4/21 and 9/20. If you have any questions or concerns, please don't hesitate to call.     Sincerely,          Kevin Villaseñor, DO

## 2021-09-15 NOTE — PATIENT INSTRUCTIONS
SURVEY:    You may be receiving a survey from [employer] regarding your visit today. Please complete the survey to enable us to provide the highest quality of care to you and your family. If you cannot score us a very good on any question, please call the office to discuss how we could of made your experience a very good one. Thank you.       Clinical Care Team:     Dr. Francy Bird, URMILA      ClericalTeam:     [id number] [facility]

## 2021-09-15 NOTE — PROGRESS NOTES
Post-Discharge Transitional Care Management Services or Hospital Follow Up      Sabas Madrid   YOB: 2000    Date of Office Visit:  9/15/2021  Date of Hospital Admission: 9/11/21  Date of Hospital Discharge: 9/12/21  Readmission Risk Score(high >=14%. Medium >=10%):Readmission Risk Score: 13      Care management risk score Rising risk (score 2-5) and Complex Care (Scores >=6): 0     Non face to face  following discharge, date last encounter closed (first attempt may have been earlier): 9/13/2021  4:05 PM 9/13/2021  4:05 PM    Call initiated 2 business days of discharge: Yes     Patient Active Problem List   Diagnosis    Current episode of major depressive disorder without prior episode    Migraine with aura and without status migrainosus, not intractable    Adjustment insomnia    Functional diarrhea    Panic attacks    Irritable bowel syndrome with diarrhea    Bacteremia       No Known Allergies    Medications listed as ordered at the time of discharge from hospital  There are no discharge medications for this patient. Medications marked \"taking\" at this time  No outpatient medications have been marked as taking for the 9/15/21 encounter (Office Visit) with Michelle Gates DO.        Medications patient taking as of now reconciled against medications ordered at time of hospital discharge: Yes    Chief Complaint   Patient presents with    Follow-Up from Hospital     Admitted 09/10/2021 left the next day AMA 09/11/2021 was scheduled for another round 09/12/2021 but never completed the IV  Vancomycin       HPI    Inpatient course: Discharge summary reviewed- see chart. This is a 80-year-old man who presented to the emergency department initially on 4 September for symptoms concerning for acute infection, versus COVID-19. Was found to be negative for COVID-19, and he was discharged with oral steroids.  He returned on 9/10, after having \"agonizing leg pain\", at which time he had some initially positive blood cultures. Infectious disease was consulted who recommended inpatient admission and treatment. He was unable to be transferred to [facility], and was admitted to our facility. He was initially treated with IV vancomycin, and his white blood cell count did respond appreciably to this decreasing from 26.4-19.2k/ul, however he decided to leave [address] the following day. Since discharge, blood culture has speciated to coagulase-negative Staph in 1 of 2 tubes. The other 2 has not grown anything. Interval history/Current status: He reports he is feeling \"better, but not 100%\". He is reporting only mild cough, SOA, as well as mild weakness/fatigue. He states he has been off work for the past 2.5 weeks due to feeling ill. He has not been febrile at home. Review of Systems   Constitutional: Positive for fatigue. Negative for fever. HENT: Negative for congestion, postnasal drip, rhinorrhea, sinus pressure, sinus pain, sneezing and sore throat. Respiratory: Positive for cough and shortness of breath. Negative for wheezing. Cardiovascular: Negative for chest pain. Gastrointestinal: Negative for abdominal pain, diarrhea, nausea and vomiting. Musculoskeletal: Negative for back pain and myalgias. Skin: Negative for rash. Neurological: Positive for weakness. Hematological: Negative for adenopathy. Psychiatric/Behavioral: Negative for sleep disturbance. Vitals:    09/15/21 0857   BP: 120/82   Site: Left Upper Arm   Position: Sitting   Cuff Size: Medium Adult   Pulse: 98   Resp: 20   SpO2: 100%   Weight: 237 lb 6.4 oz (107.7 kg)   Height: 6' 1\" (1.854 m)     Body mass index is 31.32 kg/m².    Wt Readings from Last 3 Encounters:   09/15/21 237 lb 6.4 oz (107.7 kg)   09/12/21 240 lb 15.4 oz (109.3 kg)   09/10/21 242 lb 14.4 oz (110.2 kg)     BP Readings from Last 3 Encounters:   09/15/21 120/82   09/12/21 (!) 145/98   09/10/21 126/75       Physical Exam  Vitals and nursing note reviewed. Constitutional:       General: He is not in acute distress. Appearance: Normal appearance. HENT:      Right Ear: Tympanic membrane, ear canal and external ear normal. There is no impacted cerumen. Left Ear: Tympanic membrane, ear canal and external ear normal. There is no impacted cerumen. Nose: Nose normal. No congestion or rhinorrhea. Mouth/Throat:      Pharynx: Oropharynx is clear. Comments: Mild geographic tongue  Eyes:      Conjunctiva/sclera: Conjunctivae normal.   Cardiovascular:      Rate and Rhythm: Normal rate and regular rhythm. Pulses: Normal pulses. Heart sounds: Normal heart sounds. No murmur heard. Pulmonary:      Effort: Pulmonary effort is normal. No respiratory distress. Breath sounds: Normal breath sounds. No wheezing. Musculoskeletal:         General: No swelling or tenderness. Normal range of motion. Cervical back: Normal range of motion and neck supple. No tenderness. Lymphadenopathy:      Cervical: No cervical adenopathy. Skin:     General: Skin is warm and dry. Capillary Refill: Capillary refill takes less than 2 seconds. Findings: No rash. Neurological:      General: No focal deficit present. Mental Status: He is alert and oriented to person, place, and time. Mental status is at baseline. Psychiatric:         Mood and Affect: Mood normal.         Behavior: Behavior normal.         Thought Content: Thought content normal.                 Assessment/Plan:  1. Hospital discharge follow-up  Reviewed hospital documentation from emergency department, inpatient notes, also reviewed labs. - [place] DISCHARGE MEDS RECONCILED W/ CURRENT OUTPATIENT MED LIST    2.  Positive blood culture  This is a very interesting case, the blood cultures alone would lead me to believe this was likely a contaminant and a false positive, however his white count was remarkably high, and did demonstrate a left shift with a notable amount of segmented neutrophils, which is consistent with bacteremia, and not consistent with viremia. I feel the most reasonable course of action would be to repeat a CBC today as well as blood cultures. I would not recommend he return to the emergency department or be rehospitalized at this time, based on his physical exam, this is inconsistent with somebody who is bacteremic. He is overall well-appearing.  - CBC Auto Differential; Future    3. Functional diarrhea  Successfully treated with Xifaxan, not currently a problem. 4. Current moderate episode of major depressive disorder without prior episode [facility])  Patient self discontinued his fluoxetine over a month ago, and did not notify our office. 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 I had a long discussion with him about the danger of self discontinuing medications without first consulting me, he stated he would consult in future.     Follow-up depending on results of blood culture, CBC      Medical Decision Making: moderate complexity

## 2021-09-16 LAB
CULTURE: NORMAL
Lab: NORMAL
SPECIMEN DESCRIPTION: NORMAL

## 2021-09-21 LAB
CULTURE: NORMAL
Lab: NORMAL
SPECIMEN DESCRIPTION: NORMAL

## 2021-10-04 ENCOUNTER — PATIENT MESSAGE (OUTPATIENT)
Dept: FAMILY MEDICINE CLINIC | Age: 21
End: 2021-10-04

## 2021-10-04 DIAGNOSIS — F41.0 PANIC ATTACKS: Primary | ICD-10-CM

## 2021-10-04 NOTE — TELEPHONE ENCOUNTER
[facility], will you see if I have any availability for tomorrow for Chance? I can see him a day early if his complaint is really that bad.

## 2021-10-04 NOTE — TELEPHONE ENCOUNTER
From: Joy Chopra  To: Jaylin Queen DO  Sent: 10/4/2021 11:53 AM EDT  Subject: Non-Urgent Medical Question    Hey Doctor Fabiola Solomon, I'm sending you a message because I was seen a week or so ago and we briefly talked about about my depression and anxiety and you told me to contact you if anything changed, well my depression is still doing good and not seeming to be a problem but my anxiety is back and it's really affecting me every day I'm having multiple anxiety/panic attacks everyday and they are not like the ones we talked about previously I would really like to get in to speak with you about it I have an appointment Wednesday at [address] [place] [address] but this is something I would really like to get in for sooner as it's making my every day life harder.

## 2021-10-05 ENCOUNTER — OFFICE VISIT (OUTPATIENT)
Dept: FAMILY MEDICINE CLINIC | Age: 21
End: 2021-10-05
Payer: COMMERCIAL

## 2021-10-05 VITALS
WEIGHT: 236.6 LBS | BODY MASS INDEX: 31.36 KG/M2 | OXYGEN SATURATION: 99 % | RESPIRATION RATE: 20 BRPM | HEIGHT: 73 IN | HEART RATE: 102 BPM | SYSTOLIC BLOOD PRESSURE: 132 MMHG | DIASTOLIC BLOOD PRESSURE: 80 MMHG

## 2021-10-05 DIAGNOSIS — F32.5 MAJOR DEPRESSIVE DISORDER WITH SINGLE EPISODE, IN FULL REMISSION (HCC): ICD-10-CM

## 2021-10-05 DIAGNOSIS — F41.0 PANIC ATTACKS: Primary | ICD-10-CM

## 2021-10-05 DIAGNOSIS — G43.109 MIGRAINE WITH AURA AND WITHOUT STATUS MIGRAINOSUS, NOT INTRACTABLE: ICD-10-CM

## 2021-10-05 PROBLEM — R78.81 BACTEREMIA: Status: RESOLVED | Noted: 2021-09-11 | Resolved: 2021-10-05

## 2021-10-05 PROCEDURE — 99214 OFFICE O/P EST MOD 30 MIN: CPT | Performed by: STUDENT IN AN ORGANIZED HEALTH CARE EDUCATION/TRAINING PROGRAM

## 2021-10-05 RX ORDER — FLUOXETINE HYDROCHLORIDE 20 MG/1
20 CAPSULE ORAL DAILY
Qty: 30 CAPSULE | Refills: 3 | Status: SHIPPED | OUTPATIENT
Start: 2021-10-05 | End: 2021-10-18 | Stop reason: DRUGHIGH

## 2021-10-05 ASSESSMENT — ENCOUNTER SYMPTOMS
ABDOMINAL PAIN: 0
WHEEZING: 0
DIARRHEA: 0
SORE THROAT: 0
SINUS PAIN: 0
NAUSEA: 0
VOMITING: 0
BACK PAIN: 0
COUGH: 0

## 2021-10-05 NOTE — PATIENT INSTRUCTIONS
Chance,    You're definitely having panic attacks and may have panic disorder. I'd restart your fluoxetine (prozac) 20mg once a day and go up to 40 mg after 7 days if you need to. You may use your xanax tabs as needed for the bad panic attacks  For your headaches, both tension and migraine, you may use 2 Naprosyn tabs at onset of headache and may take another in 1 hour if needed. Come back in 4 weeks. Keep track of how many panic attacks you have each week and bring the number to discuss.     Dr. Tracy Sutton

## 2021-10-05 NOTE — PROGRESS NOTES
HPI Notes    Name: Za Bazzi  : 2000         Chief Complaint:     Chief Complaint   Patient presents with    Anxiety     Would like to discuss panic attacks increased Heart rate, chest tightness, dizziness and shakey onset  last week and its been every day        History of Present Illness:      HPI    This is a 77-year-old man with medical history significant for major depression, panic attacks presenting for evaluation of the latter. He had actually sent me a message on my chart on 10/4/2021 stating that his symptoms of major depression are well controlled, and his mood is overall good, but he has been having an increased amount of panic attacks, and wished to be evaluated in the office soon for that. These have been going on more frequently for about ten days. He recounts that these are \"different\" and are happening once daily to multiple times a day. Prior panic attacks had a noticeable \"buildup\", but these \"hit full on\". His symptoms include chest tightness, sense of impending doom, racing heart rate, \"unconrollable body shaking\". These episodes last approximately 20 minutes at the longest.     He reports no job or familial stressors at home, and actually states that his functionality is improved and his mood is good at home. He reports that he gets headaches more frequently, particularly when he \"comes down off a panic attack\". This is described as a pressure on the top right aspect of his head. Several of these have turned into migraine headaches. He has been taking ibuprofen which are effective for the more mild headaches, but not for the migraine headaches.      Past Medical History:     Past Medical History:   Diagnosis Date    IBS (irritable bowel syndrome)     Insomnia     Major depressive disorder     Migraine     Panic attacks 2021      Reviewed all health maintenance requirements and ordered appropriate tests  Health Maintenance Due   Topic Date Due    Hepatitis C screen Never done    HPV vaccine (1 - Male 2-dose series) Never done    COVID-19 Vaccine (1) Never done    HIV screen  Never done    Flu vaccine (1) 09/01/2021       Past Surgical History:     Past Surgical History:   Procedure Laterality Date    TONSILLECTOMY          Medications:       Prior to Admission medications    Not on File        Allergies:       Patient has no known allergies. Social History:     Tobacco:    reports that he has never smoked. He has never used smokeless tobacco.  Alcohol:      reports no history of alcohol use. Drug Use:  reports no history of drug use. Family History:     No family history on file. Review of Systems:         Review of Systems   Constitutional: Negative for fever. HENT: Negative for sinus pain, sneezing and sore throat. Respiratory: Negative for cough and wheezing. Cardiovascular: Negative for chest pain. Gastrointestinal: Negative for abdominal pain, diarrhea, nausea and vomiting. Musculoskeletal: Negative for back pain. Skin: Negative for rash. Psychiatric/Behavioral: Negative for behavioral problems, dysphoric mood and sleep disturbance. The patient is nervous/anxious. Asymptomatic now, but does report chest tightness, racing heart rate, sense of impending doom, increased amount of headaches. Physical Exam:     Vitals:  /80 (Site: Left Upper Arm, Position: Sitting, Cuff Size: Large Adult)   Pulse 102   Resp 20   Ht 6' 1\" (1.854 m)   Wt 236 lb 9.6 oz (107.3 kg)   SpO2 99%   BMI 31.22 kg/m²       Physical Exam  Vitals and nursing note reviewed. Constitutional:       General: He is not in acute distress. Appearance: Normal appearance. Musculoskeletal:         General: No swelling or tenderness. Normal range of motion. Skin:     General: Skin is warm and dry. Capillary Refill: Capillary refill takes less than 2 seconds. Neurological:      General: No focal deficit present.       Mental Status: He is alert and oriented to person, place, and time. Mental status is at baseline. Psychiatric:         Mood and Affect: Mood normal.         Behavior: Behavior normal.         Thought Content: Thought content normal.                 Data:     Lab Results   Component Value Date     09/12/2021    K 4.4 09/12/2021     09/12/2021    CO2 27 09/12/2021    BUN 22 09/12/2021    CREATININE 1.00 09/12/2021    GLUCOSE 103 09/12/2021    GLUCOSE 93 11/25/2011    PROT 7.2 09/11/2021    LABALBU 4.0 09/11/2021    BILITOT 0.29 09/11/2021    ALKPHOS 110 09/11/2021    AST 19 09/11/2021    ALT 37 09/11/2021     Lab Results   Component Value Date    WBC 10.9 09/15/2021    RBC 5.11 09/15/2021    RBC 4.52 11/25/2011    HGB 14.2 09/15/2021    HCT 43.1 09/15/2021    MCV 84.3 09/15/2021    MCH 27.8 09/15/2021    MCHC 33.0 09/15/2021    RDW 14.1 09/15/2021     09/15/2021     11/25/2011    MPV NOT REPORTED 09/15/2021     Lab Results   Component Value Date    TSH 0.81 05/27/2021     No results found for: CHOL, HDL, PSA, LABA1C       Assessment & Plan        Diagnosis Orders   1. Panic attacks     2. Major depressive disorder with single episode, in full remission ([address]     3. Migraine with aura and without status migrainosus, not intractable         1. Ddx includes panic disorder principally, but may be simply untreated, or suboptimally treated, panic attacks. Would recommend restarting Prozac for prophylactic therapy at 20mg, may increase to 40mg. Xanax 0.25mg tab may be used PRN for anxiolysis during panic attacks. 3. For migraine headaches, I reiterated he may use Naprosyn 1100mg at onset of headache, may take 550mg an hour later if needed. Follow up in 4 weeks for this problem. Completed Refills   Requested Prescriptions      No prescriptions requested or ordered in this encounter     Return in about 4 weeks (around 11/2/2021) for panic disorder.   No orders of the defined types were placed in this

## 2021-10-18 ENCOUNTER — APPOINTMENT (OUTPATIENT)
Dept: GENERAL RADIOLOGY | Age: 21
End: 2021-10-18
Payer: COMMERCIAL

## 2021-10-18 ENCOUNTER — HOSPITAL ENCOUNTER (EMERGENCY)
Age: 21
Discharge: HOME | End: 2021-10-18
Attending: EMERGENCY MEDICINE
Payer: COMMERCIAL

## 2021-10-18 ENCOUNTER — OFFICE VISIT (OUTPATIENT)
Dept: FAMILY MEDICINE CLINIC | Age: 21
End: 2021-10-18
Payer: COMMERCIAL

## 2021-10-18 VITALS
DIASTOLIC BLOOD PRESSURE: 70 MMHG | HEART RATE: 84 BPM | BODY MASS INDEX: 30.59 KG/M2 | WEIGHT: 230.8 LBS | HEIGHT: 73 IN | RESPIRATION RATE: 20 BRPM | SYSTOLIC BLOOD PRESSURE: 118 MMHG | OXYGEN SATURATION: 98 %

## 2021-10-18 VITALS
SYSTOLIC BLOOD PRESSURE: 124 MMHG | OXYGEN SATURATION: 96 % | HEART RATE: 70 BPM | TEMPERATURE: 98 F | RESPIRATION RATE: 17 BRPM | BODY MASS INDEX: 30.56 KG/M2 | WEIGHT: 231.6 LBS | DIASTOLIC BLOOD PRESSURE: 77 MMHG

## 2021-10-18 DIAGNOSIS — F32.5 MAJOR DEPRESSIVE DISORDER WITH SINGLE EPISODE, IN FULL REMISSION (HCC): ICD-10-CM

## 2021-10-18 DIAGNOSIS — F41.0 PANIC ATTACKS: Primary | ICD-10-CM

## 2021-10-18 DIAGNOSIS — F41.1 ANXIETY STATE: Primary | ICD-10-CM

## 2021-10-18 LAB
EKG ATRIAL RATE: 79 BPM
EKG P AXIS: 29 DEGREES
EKG P-R INTERVAL: 122 MS
EKG Q-T INTERVAL: 346 MS
EKG QRS DURATION: 82 MS
EKG QTC CALCULATION (BAZETT): 396 MS
EKG R AXIS: 46 DEGREES
EKG T AXIS: 4 DEGREES
EKG VENTRICULAR RATE: 79 BPM
TROPONIN INTERP: NORMAL
TROPONIN T: NORMAL NG/ML
TROPONIN, HIGH SENSITIVITY: <6 NG/L (ref 0–22)

## 2021-10-18 PROCEDURE — 36415 COLL VENOUS BLD VENIPUNCTURE: CPT

## 2021-10-18 PROCEDURE — 93005 ELECTROCARDIOGRAM TRACING: CPT | Performed by: EMERGENCY MEDICINE

## 2021-10-18 PROCEDURE — 6360000002 HC RX W HCPCS: Performed by: EMERGENCY MEDICINE

## 2021-10-18 PROCEDURE — 84484 ASSAY OF TROPONIN QUANT: CPT

## 2021-10-18 PROCEDURE — 99285 EMERGENCY DEPT VISIT HI MDM: CPT

## 2021-10-18 PROCEDURE — 99214 OFFICE O/P EST MOD 30 MIN: CPT | Performed by: STUDENT IN AN ORGANIZED HEALTH CARE EDUCATION/TRAINING PROGRAM

## 2021-10-18 PROCEDURE — 96374 THER/PROPH/DIAG INJ IV PUSH: CPT

## 2021-10-18 PROCEDURE — 96375 TX/PRO/DX INJ NEW DRUG ADDON: CPT

## 2021-10-18 PROCEDURE — 93010 ELECTROCARDIOGRAM REPORT: CPT | Performed by: INTERNAL MEDICINE

## 2021-10-18 PROCEDURE — 71045 X-RAY EXAM CHEST 1 VIEW: CPT

## 2021-10-18 RX ORDER — LORAZEPAM 2 MG/ML
1 INJECTION INTRAMUSCULAR ONCE
Status: COMPLETED | OUTPATIENT
Start: 2021-10-18 | End: 2021-10-18

## 2021-10-18 RX ORDER — FLUOXETINE HYDROCHLORIDE 40 MG/1
40 CAPSULE ORAL DAILY
Qty: 30 CAPSULE | Refills: 3 | Status: SHIPPED | OUTPATIENT
Start: 2021-10-18 | End: 2021-12-02

## 2021-10-18 RX ORDER — KETOROLAC TROMETHAMINE 30 MG/ML
15 INJECTION, SOLUTION INTRAMUSCULAR; INTRAVENOUS ONCE
Status: COMPLETED | OUTPATIENT
Start: 2021-10-18 | End: 2021-10-18

## 2021-10-18 RX ORDER — CLONAZEPAM 0.5 MG/1
0.5 TABLET ORAL EVERY EVENING
Qty: 30 TABLET | Refills: 1 | Status: SHIPPED | OUTPATIENT
Start: 2021-10-18 | End: 2021-10-27 | Stop reason: SINTOL

## 2021-10-18 RX ADMIN — LORAZEPAM 1 MG: 2 INJECTION, SOLUTION INTRAMUSCULAR; INTRAVENOUS at 02:00

## 2021-10-18 RX ADMIN — KETOROLAC TROMETHAMINE 15 MG: 30 INJECTION, SOLUTION INTRAMUSCULAR; INTRAVENOUS at 02:01

## 2021-10-18 ASSESSMENT — PAIN SCALES - GENERAL
PAINLEVEL_OUTOF10: 5
PAINLEVEL_OUTOF10: 5

## 2021-10-18 ASSESSMENT — ENCOUNTER SYMPTOMS
COUGH: 0
COLOR CHANGE: 0
VOMITING: 0
ABDOMINAL PAIN: 0
BACK PAIN: 0
WHEEZING: 0
CHOKING: 0
DIARRHEA: 0
NAUSEA: 0
SORE THROAT: 0
SINUS PAIN: 0

## 2021-10-18 ASSESSMENT — PAIN DESCRIPTION - LOCATION: LOCATION: CHEST

## 2021-10-18 NOTE — ED PROVIDER NOTES
[facility] ED  eMERGENCY dEPARTMENT eNCOUnter      Pt Name: Ari Ch  MRN: [id number]  [place] 2000  Date of evaluation: 10/18/2021  Provider: Rocael Beaver MD    CHIEF COMPLAINT       Chief Complaint   Patient presents with    Chest Pain     Pt has been having chest pain/ anxiety since yesterday. Patient has anxiety but feels this is the worst panic attack he has ever had. Patient is a 70-year-old male who presents to the emergency department complaining of chest pain with anxiety since yesterday. He states it feels like panic attacks but it is just not going away. He denies any pain in his legs. He denies any abdominal pain. He states it feels like sharp pain when he takes a deep breath. He denies other associated symptoms. He denies any fever or chills. Nursing Notes were reviewed. REVIEW OF SYSTEMS    (2-9 systems for level 4, 10 or more for level 5)     Review of Systems   Constitutional: Negative for chills and fever. Respiratory: Negative for choking. Skin: Negative for color change and rash. Neurological: Negative for weakness and numbness. Except as noted above the remainder of the review of systems was reviewed and negative. PAST MEDICAL HISTORY     Past Medical History:   Diagnosis Date    IBS (irritable bowel syndrome)     Insomnia     Major depressive disorder     Migraine     Panic attacks 05/27/2021         SURGICAL HISTORY       Past Surgical History:   Procedure Laterality Date    TONSILLECTOMY           ALLERGIES     Patient has no known allergies. FAMILY HISTORY     History reviewed. No pertinent family history.        SOCIAL HISTORY       Social History     Socioeconomic History    Marital status: Single     Spouse name: None    Number of children: None    Years of education: None    Highest education level: None   Occupational History    None   Tobacco Use    Smoking status: Never Smoker    Smokeless tobacco: Never Used Vaping Use    Vaping Use: Every day    Substances: Never   Substance and Sexual Activity    Alcohol use: No    Drug use: No    Sexual activity: None   Other Topics Concern    None   Social History Narrative    None     Social Determinants of Health     Financial Resource Strain: Low Risk     Difficulty of Paying Living Expenses: Not hard at all   Food Insecurity: No Food Insecurity    Worried About Running Out of Food in the Last Year: Never true    Latoya of Food in the Last Year: Never true   Transportation Needs:     Lack of Transportation (Medical):  Lack of Transportation (Non-Medical):    Physical Activity:     Days of Exercise per Week:     Minutes of Exercise per Session:    Stress:     Feeling of Stress :    Social Connections:     Frequency of Communication with Friends and Family:     Frequency of Social Gatherings with Friends and Family:     Attends [employer] Services:     Active Member of Clubs or Organizations:     Attends Club or Organization Meetings:     Marital Status:    Intimate Partner Violence:     Fear of Current or Ex-Partner:     Emotionally Abused:     Physically Abused:     Sexually Abused:            PHYSICAL EXAM    (up to 7 for level 4, 8 ormore for level 5)     ED Triage Vitals [10/18/21 0147]   BP Temp Temp Source Pulse Resp SpO2 Height Weight   (!) 147/88 98 °F (36.7 °C) Oral 86 16 100 % -- 231 lb 9.6 oz (105.1 kg)       Physical Exam     Physical    Vital signs and nursing notes were reviewed as well as the social, family, and past medical history. Gen. appearance: Patient is alert and oriented and in no acute distress    Head: Atraumatic, normocephalic    Neck: Supple, trachea/thyroid normal    EENT: PERRLA, EOMI, conjunctiva normal.    Skin: Warm and dry with no rash    Cardiovascular: Heart RRR, no gallops or rubs, no aortic enlargement or bruits noted.     Respiratory: Lungs clear, no wheezing, no rales, normal breath

## 2021-10-18 NOTE — PROGRESS NOTES
HPI Notes    Name: Michelle Hernandez  : 2000         Chief Complaint:     Chief Complaint   Patient presents with    Anxiety     Pt states he was in the ED for panic attacks and would like to discuss medication change  states his panic attact lasted from saturday until today ,       History of Present Illness:      HPI    This is a 59-year-old man with medical history significant for major depression, currently in remission, as well as panic attacks presenting to discuss the latter. He comes to our office more or less directly from his emergency department visit discharge, at which time he was seen for a panic attack lasting longer than 1 day. He was concerned that he was having cardiac chest pain due to the length of time this was going on, but ECG this morning was negative for acute coronary syndrome. He currently takes 20 mg p.o. daily Prozac. He had utilized alprazolam three times on 10/16, which did not relieve his symptoms (mainly chest pain, feeling of panic, upset stomach/nausea, dizziness). He was treated with Ativan in the emergency department, which was very effective at relieving his anxiety. He is not using any recreational drugs, not smoking cannabis. He states that over the past month, he has had 15-20 panic attacks ranging in severity each day. These have markedly worsened in frequency since his most recent brief hospitalization for transient bacteremia. He has missed work several times due to panic attacks. He has also been reporting difficulty sleeping due to these symptoms (noticing some symptoms when trying to lie down, and then \"getting myself worked up\").      Past Medical History:     Past Medical History:   Diagnosis Date    IBS (irritable bowel syndrome)     Insomnia     Major depressive disorder     Migraine     Panic attacks 2021      Reviewed all health maintenance requirements and ordered appropriate tests  Health Maintenance Due   Topic Date Due    Hepatitis C screen  Never done    HPV vaccine (1 - Male 2-dose series) Never done    COVID-19 Vaccine (1) Never done    HIV screen  Never done    Flu vaccine (1) 09/01/2021       Past Surgical History:     Past Surgical History:   Procedure Laterality Date    TONSILLECTOMY          Medications:       Prior to Admission medications    Not on File        Allergies:       Patient has no known allergies. Social History:     Tobacco:    reports that he has never smoked. He has never used smokeless tobacco.  Alcohol:      reports no history of alcohol use. Drug Use:  reports no history of drug use. Family History:     No family history on file. Review of Systems:     Review of Systems   Constitutional: Negative for fever. HENT: Negative for sinus pain, sneezing and sore throat. Respiratory: Negative for cough and wheezing. Cardiovascular: Negative for chest pain. Gastrointestinal: Negative for abdominal pain, diarrhea, nausea and vomiting. Musculoskeletal: Negative for back pain. Skin: Negative for rash. Psychiatric/Behavioral: Positive for sleep disturbance. Negative for decreased concentration, dysphoric mood and suicidal ideas. The patient is nervous/anxious. Physical Exam:     Vitals:  /70 (Site: Left Upper Arm, Position: Sitting, Cuff Size: Large Adult)   Pulse 84   Resp 20   Ht 6' 1\" (1.854 m)   Wt 230 lb 12.8 oz (104.7 kg)   SpO2 98%   BMI 30.45 kg/m²       Physical Exam  Vitals and nursing note reviewed. Constitutional:       General: He is not in acute distress. Appearance: Normal appearance. Musculoskeletal:         General: No swelling or tenderness. Normal range of motion. Skin:     General: Skin is warm and dry. Capillary Refill: Capillary refill takes less than 2 seconds. Neurological:      General: No focal deficit present. Mental Status: He is alert and oriented to person, place, and time. Mental status is at baseline.    Psychiatric:         Mood and Affect: Mood normal.         Behavior: Behavior normal.         Thought Content: Thought content normal.           Data:     Lab Results   Component Value Date     09/12/2021    K 4.4 09/12/2021     09/12/2021    CO2 27 09/12/2021    BUN 22 09/12/2021    CREATININE 1.00 09/12/2021    GLUCOSE 103 09/12/2021    GLUCOSE 93 11/25/2011    PROT 7.2 09/11/2021    LABALBU 4.0 09/11/2021    BILITOT 0.29 09/11/2021    ALKPHOS 110 09/11/2021    AST 19 09/11/2021    ALT 37 09/11/2021     Lab Results   Component Value Date    WBC 10.9 09/15/2021    RBC 5.11 09/15/2021    RBC 4.52 11/25/2011    HGB 14.2 09/15/2021    HCT 43.1 09/15/2021    MCV 84.3 09/15/2021    MCH 27.8 09/15/2021    MCHC 33.0 09/15/2021    RDW 14.1 09/15/2021     09/15/2021     11/25/2011    MPV NOT REPORTED 09/15/2021     Lab Results   Component Value Date    TSH 0.81 05/27/2021     No results found for: CHOL, HDL, PSA, LABA1C       Assessment & Plan        Diagnosis Orders   1. Panic attacks     2. Major depressive disorder with single episode, in full remission ([facility] [address]         1. Frequency of his panic attacks raises concern for panic disorder, in addition to his major depression in remission. Short acting benzodiazepines have been conducted due to the frequency of his panic attacks, and I certainly would not recommend Xanax in continuation, as he would be utilized in an excessive amount of this medication. I would recommend switching to a longer acting benzodiazepine, such as clonazepam dose at bedtime, and then may increase dose either remaining at bedtime, or divided twice daily. The patient I had a long discussion about starting clonazepam 0.5 mg nightly. We discussed its mechanism of action, intended goals, adverse effects, as well as common side effects. They were able to verbalize understanding, and repeat plan back to me.     He is to send us a [employer] message in 5 days updating us on whether or not he is having any side effects, and how efficacious this is. 2. Continue Prozac at 40mg dose. Follow up as needed prior to next OV on 11/02      Completed Refills   Requested Prescriptions     Pending Prescriptions Disp Refills    clonazePAM (KLONOPIN) 0.5 MG tablet 30 tablet 1     Sig: Take 1 tablet by mouth every evening for 60 days.  FLUoxetine (PROZAC) 40 MG capsule 30 capsule 3     Sig: Take 1 capsule by mouth daily     No follow-ups on file. No orders of the defined types were placed in this encounter. No orders of the defined types were placed in this encounter. Patient Instructions       SURVEY:    You may be receiving a survey from [employer] regarding your visit today. Please complete the survey to enable us to provide the highest quality of care to you and your family. If you cannot score us a very good on any question, please call the office to discuss how we could of made your experience a very good one. Thank you. Clinical Care Team:     Dr. Debra Perera, [facility]      ClericalTeam:     [facility]  Patient Education        Panic Attacks: Care Instructions  Overview     During a panic attack, you may have a feeling of intense fear or terror, trouble breathing, chest pain or tightness, heartbeat changes, dizziness, sweating, and shaking. A panic attack starts suddenly and usually lasts from 5 to 20 minutes but may last even longer. An attack can begin with a stressful event. Or it can happen without a cause. Although panic attacks can cause scary symptoms, you can learn to manage them with self-care, counseling, and medicine. Follow-up care is a key part of your treatment and safety. Be sure to make and go to all appointments, and call your doctor if you are having problems. It's also a good idea to know your test results and keep a list of the medicines you take. How can you care for yourself at home?   · Take your medicine exactly as directed. Call your doctor if you think you are having a problem with your medicine. · Go to your counseling sessions and follow-up appointments. · Recognize and accept your anxiety. Then, when you are in a situation that makes you anxious, say to yourself, \"This is not an emergency. I feel uncomfortable, but I am not in danger. I can keep going even if I feel anxious. \"  · Be kind to your body:  ? Relieve tension with exercise or a massage. ? Get enough rest.  ? Avoid alcohol, caffeine, nicotine, and illegal drugs. They can increase your anxiety level, cause sleep problems, or trigger a panic attack. ? Learn and do relaxation techniques. See below for more about these techniques. · Engage your mind. Get out and do something you enjoy. Go to a funny movie, or take a walk or hike. Plan your day. Having too much or too little to do can make you anxious. · Keep a record of your symptoms. Discuss your fears with a good friend or family member, or join a support group for people with similar problems. Talking to others sometimes relieves stress. · Get involved in social groups, or volunteer to help others. Being alone sometimes makes things seem worse than they are. · Get at least 30 minutes of exercise on most days of the week to relieve stress. Walking is a good choice. You also may want to do other activities, such as running, swimming, cycling, or playing tennis or team sports. Relaxation techniques  Do relaxation exercises for 10 to 20 minutes a day. You can play soothing, relaxing music while you do them, if you wish. · Tell others in your house that you are going to do your relaxation exercises. Ask them not to disturb you. · Find a comfortable place, away from all distractions and noise. · Lie down on your back, or sit with your back straight. · Focus on your breathing. Make it slow and steady. · Breathe in through your nose. Breathe out through either your nose or mouth.   · Breathe deeply, filling up the area between your navel and your rib cage. Breathe so that your belly goes up and down. · Do not hold your breath. · Breathe like this for 5 to 10 minutes. Notice the feeling of calmness throughout your whole body. As you continue to breathe slowly and deeply, relax by doing the following for another 5 to 10 minutes:  · Tighten and relax each muscle group in your body. You can begin at your toes and work your way up to your head. · Imagine your muscle groups relaxing and becoming heavy. · Empty your mind of all thoughts. · Let yourself relax more and more deeply. · Become aware of the state of calmness that surrounds you. · When your relaxation time is over, you can bring yourself back to alertness by moving your fingers and toes and then your hands and feet and then stretching and moving your entire body. Sometimes people fall asleep during relaxation, but they usually wake up shortly afterward. · Always give yourself time to return to full alertness before you drive a car or do anything that might cause an accident if you are not fully alert. Never play a relaxation tape while driving a car. When should you call for help? Call 911 anytime you think you may need emergency care. For example, call if:    · You feel you cannot stop from hurting yourself or someone else. Watch closely for changes in your health, and be sure to contact your doctor if:    · Your panic attacks get worse.     · You have new or different anxiety.     · You are not getting better as expected. Where can you learn more? Go to https://[employer]. org and sign in to your [employer] account. Enter H601 in the Medigus box to learn more about \"Panic Attacks: Care Instructions. \"     If you do not have an account, please click on the \"Sign Up Now\" link. Current as of: June 16, 2021               Content Version: 13.0  © 0358-0571 Healthwise, Incorporated.    Care instructions adapted under license by [facility] ([facility]). If you have questions about a medical condition or this instruction, always ask your healthcare professional. [place] [address] any warranty or liability for your use of this information. Electronically signed by Cachorro Garcia DO on 10/18/2021 at 7:26 AM           Completed Refills   Requested Prescriptions     Pending Prescriptions Disp Refills    clonazePAM (KLONOPIN) 0.5 MG tablet 30 tablet 1     Sig: Take 1 tablet by mouth every evening for 60 days.  FLUoxetine (PROZAC) 40 MG capsule 30 capsule 3     Sig: Take 1 capsule by mouth daily         Chance received counseling on the following healthy behaviors: medication adherence  Reviewed prior labs and health maintenance. Continue current medications, diet and exercise. Discussed use, benefit, and side effects of prescribed medications. Barriers to medication compliance addressed. Patient given educational materials - see patient instructions. All patient questions answered. Patient voiced understanding.

## 2021-10-18 NOTE — PATIENT INSTRUCTIONS
Chance,    I'm a bit concerned you have panic disorder. Take today off. Go back to work tomorrow. Continue Fluoxetine at 40mg once a day  STOP Xanax  START Clonazepam 0.5mg at bedtime. Send me a [employer] message on Friday to let me know how it's going; we may increase it at that time. Come back on 11/02    Dr. Devyn Almanza:    You may be receiving a survey from "[employer] regarding your visit today. Please complete the survey to enable us to provide the highest quality of care to you and your family. If you cannot score us a very good on any question, please call the office to discuss how we could of made your experience a very good one. Thank you. Clinical Care Team:     Dr. Abby Gamble, RAMESH Osuna:     Navin Claire  Patient Education        Panic Attacks: Care Instructions  Overview     During a panic attack, you may have a feeling of intense fear or terror, trouble breathing, chest pain or tightness, heartbeat changes, dizziness, sweating, and shaking. A panic attack starts suddenly and usually lasts from 5 to 20 minutes but may last even longer. An attack can begin with a stressful event. Or it can happen without a cause. Although panic attacks can cause scary symptoms, you can learn to manage them with self-care, counseling, and medicine. Follow-up care is a key part of your treatment and safety. Be sure to make and go to all appointments, and call your doctor if you are having problems. It's also a good idea to know your test results and keep a list of the medicines you take. How can you care for yourself at home? · Take your medicine exactly as directed. Call your doctor if you think you are having a problem with your medicine. · Go to your counseling sessions and follow-up appointments. · Recognize and accept your anxiety.  Then, when you are in a situation that makes you anxious, say to yourself, \"This is not an emergency. I feel uncomfortable, but I am not in danger. I can keep going even if I feel anxious. \"  · Be kind to your body:  ? Relieve tension with exercise or a massage. ? Get enough rest.  ? Avoid alcohol, caffeine, nicotine, and illegal drugs. They can increase your anxiety level, cause sleep problems, or trigger a panic attack. ? Learn and do relaxation techniques. See below for more about these techniques. · Engage your mind. Get out and do something you enjoy. Go to a funny movie, or take a walk or hike. Plan your day. Having too much or too little to do can make you anxious. · Keep a record of your symptoms. Discuss your fears with a good friend or family member, or join a support group for people with similar problems. Talking to others sometimes relieves stress. · Get involved in social groups, or volunteer to help others. Being alone sometimes makes things seem worse than they are. · Get at least 30 minutes of exercise on most days of the week to relieve stress. Walking is a good choice. You also may want to do other activities, such as running, swimming, cycling, or playing tennis or team sports. Relaxation techniques  Do relaxation exercises for 10 to 20 minutes a day. You can play soothing, relaxing music while you do them, if you wish. · Tell others in your house that you are going to do your relaxation exercises. Ask them not to disturb you. · Find a comfortable place, away from all distractions and noise. · Lie down on your back, or sit with your back straight. · Focus on your breathing. Make it slow and steady. · Breathe in through your nose. Breathe out through either your nose or mouth. · Breathe deeply, filling up the area between your navel and your rib cage. Breathe so that your belly goes up and down. · Do not hold your breath. · Breathe like this for 5 to 10 minutes. Notice the feeling of calmness throughout your whole body.   As you continue to breathe slowly and deeply, relax by doing the following for another 5 to 10 minutes:  · Tighten and relax each muscle group in your body. You can begin at your toes and work your way up to your head. · Imagine your muscle groups relaxing and becoming heavy. · Empty your mind of all thoughts. · Let yourself relax more and more deeply. · Become aware of the state of calmness that surrounds you. · When your relaxation time is over, you can bring yourself back to alertness by moving your fingers and toes and then your hands and feet and then stretching and moving your entire body. Sometimes people fall asleep during relaxation, but they usually wake up shortly afterward. · Always give yourself time to return to full alertness before you drive a car or do anything that might cause an accident if you are not fully alert. Never play a relaxation tape while driving a car. When should you call for help? Call 911 anytime you think you may need emergency care. For example, call if:    · You feel you cannot stop from hurting yourself or someone else. Watch closely for changes in your health, and be sure to contact your doctor if:    · Your panic attacks get worse.     · You have new or different anxiety.     · You are not getting better as expected. Where can you learn more? Go to https://[employer]. org and sign in to your [employer] account. Enter H601 in the ITYZ box to learn more about \"Panic Attacks: Care Instructions. \"     If you do not have an account, please click on the \"Sign Up Now\" link. Current as of: June 16, 2021               Content Version: 13.0  © 3538-1467 Healthwise, Incorporated. Care instructions adapted under license by [address] If you have questions about a medical condition or this instruction, always ask your healthcare professional. [place] [address] any warranty or liability for your use of this information.

## 2021-10-19 ENCOUNTER — TELEPHONE (OUTPATIENT)
Dept: FAMILY MEDICINE CLINIC | Age: 21
End: 2021-10-19

## 2021-10-19 ENCOUNTER — HOSPITAL ENCOUNTER (EMERGENCY)
Age: 21
Discharge: HOME | End: 2021-10-19
Attending: EMERGENCY MEDICINE
Payer: COMMERCIAL

## 2021-10-19 ENCOUNTER — APPOINTMENT (OUTPATIENT)
Dept: GENERAL RADIOLOGY | Age: 21
End: 2021-10-19
Payer: COMMERCIAL

## 2021-10-19 VITALS
BODY MASS INDEX: 30.41 KG/M2 | RESPIRATION RATE: 16 BRPM | SYSTOLIC BLOOD PRESSURE: 129 MMHG | DIASTOLIC BLOOD PRESSURE: 86 MMHG | OXYGEN SATURATION: 100 % | HEART RATE: 88 BPM | TEMPERATURE: 98.1 F | WEIGHT: 230.5 LBS

## 2021-10-19 DIAGNOSIS — R07.89 CHEST TIGHTNESS: ICD-10-CM

## 2021-10-19 DIAGNOSIS — F41.0 PANIC ATTACK: Primary | ICD-10-CM

## 2021-10-19 LAB
EKG ATRIAL RATE: 75 BPM
EKG P AXIS: 30 DEGREES
EKG P-R INTERVAL: 124 MS
EKG Q-T INTERVAL: 372 MS
EKG QRS DURATION: 80 MS
EKG QTC CALCULATION (BAZETT): 415 MS
EKG R AXIS: 41 DEGREES
EKG T AXIS: 12 DEGREES
EKG VENTRICULAR RATE: 75 BPM

## 2021-10-19 PROCEDURE — 6370000000 HC RX 637 (ALT 250 FOR IP): Performed by: EMERGENCY MEDICINE

## 2021-10-19 PROCEDURE — 93005 ELECTROCARDIOGRAM TRACING: CPT | Performed by: EMERGENCY MEDICINE

## 2021-10-19 PROCEDURE — 99284 EMERGENCY DEPT VISIT MOD MDM: CPT

## 2021-10-19 PROCEDURE — 93010 ELECTROCARDIOGRAM REPORT: CPT | Performed by: INTERNAL MEDICINE

## 2021-10-19 RX ORDER — LORAZEPAM 0.5 MG/1
1 TABLET ORAL ONCE
Status: COMPLETED | OUTPATIENT
Start: 2021-10-19 | End: 2021-10-19

## 2021-10-19 RX ADMIN — LORAZEPAM 1 MG: 0.5 TABLET ORAL at 03:21

## 2021-10-19 ASSESSMENT — ENCOUNTER SYMPTOMS
SHORTNESS OF BREATH: 0
VOMITING: 0
DIARRHEA: 0
WHEEZING: 0
CHEST TIGHTNESS: 0
BACK PAIN: 0
ABDOMINAL PAIN: 0
COUGH: 0
NAUSEA: 0

## 2021-10-19 NOTE — ED PROVIDER NOTES
[facility] ED  eMERGENCY dEPARTMENT eNCOUnter      Pt Name: Adriana Hammonds  MRN: [id number]  [place] 2000  Date of evaluation: 10/19/2021  Provider: Kayce Raphael DO    CHIEF COMPLAINT     Chief Complaint   Patient presents with    Panic Attack     Patient has anxiety and sees a doctor he just started Klonipine today. HISTORY OF PRESENT ILLNESS    Sabas CJ Tierney is a 24 y.o. male who presents to the emergency department from home with chest tightness. Feels he is having anxiety attack. Was here 1 day ago for the same symptoms. Did a troponin at that time which was unremarkable. CXR yesterday also normal.  He was discharged home. He improved after receiving Ativan. Patient was seen also in September for chest pain. He had a CTA of the chest at that time which was negative. Switched to [employer] yesterday when he saw his family doctor at 7 AM.  He has a long history of panic attacks. They took him off of Xanax and put him on the Klonopin. He states the new medication  is just not working. Triage notes and Nursing notes were reviewed by myself. Any discrepancies are addressed above. PAST MEDICAL HISTORY     Past Medical History:   Diagnosis Date    IBS (irritable bowel syndrome)     Insomnia     Major depressive disorder     Migraine     Panic attacks 05/27/2021       SURGICAL HISTORY       Past Surgical History:   Procedure Laterality Date    TONSILLECTOMY         CURRENT MEDICATIONS       Current Discharge Medication List      CONTINUE these medications which have NOT CHANGED    Details   clonazePAM (KLONOPIN) 0.5 MG tablet Take 1 tablet by mouth every evening for 60 days. Qty: 30 tablet, Refills: 1    Associated Diagnoses: Panic attacks      FLUoxetine (PROZAC) 40 MG capsule Take 1 capsule by mouth daily  Qty: 30 capsule, Refills: 3    Associated Diagnoses: Panic attacks;  Major depressive disorder with single episode, in full remission ([facility] [address]             ALLERGIES Patient has no known allergies. FAMILY HISTORY     No family history on file. SOCIAL HISTORY     Social History     Socioeconomic History    Marital status: Single     Spouse name: Not on file    Number of children: Not on file    Years of education: Not on file    Highest education level: Not on file   Occupational History    Not on file   Tobacco Use    Smoking status: Never Smoker    Smokeless tobacco: Never Used   Vaping Use    Vaping Use: Every day    Substances: Never   Substance and Sexual Activity    Alcohol use: No    Drug use: No    Sexual activity: Not on file   Other Topics Concern    Not on file   Social History Narrative    Not on file     Social Determinants of Health     Financial Resource Strain: Low Risk     Difficulty of Paying Living Expenses: Not hard at all   Food Insecurity: No Food Insecurity    Worried About [address] in the Last Year: Never true    [address] [place] in the Last Year: Never true   Transportation Needs:     Lack of Transportation (Medical):  Lack of Transportation (Non-Medical):    Physical Activity:     Days of Exercise per Week:     Minutes of Exercise per Session:    Stress:     Feeling of Stress :    Social Connections:     Frequency of Communication with Friends and Family:     Frequency of Social Gatherings with Friends and Family:     Attends Christian Services:     Active Member of Clubs or Organizations:     Attends Club or Organization Meetings:     Marital Status:    Intimate Partner Violence:     Fear of Current or Ex-Partner:     Emotionally Abused:     Physically Abused:     Sexually Abused:        REVIEW OF SYSTEMS       Review of Systems   Constitutional: Negative for activity change, chills, diaphoresis, fatigue and fever. Respiratory: Negative for cough, chest tightness, shortness of breath and wheezing. Cardiovascular: Positive for chest pain. Negative for palpitations and leg swelling. Gastrointestinal: Negative for abdominal pain, diarrhea, nausea and vomiting. Genitourinary: Negative for dysuria, flank pain and frequency. Musculoskeletal: Negative for back pain, myalgias and neck pain. Skin: Negative for pallor, rash and wound. Neurological: Negative for dizziness, syncope, light-headedness and headaches. Psychiatric/Behavioral: Negative for hallucinations, self-injury, sleep disturbance and suicidal ideas. Except as noted above the remainder of the review of systems was reviewed and is negative. PHYSICAL EXAM    (up to 7 for level 4, 8 or more for level 5)     ED Triage Vitals   BP Temp Temp src Pulse Resp SpO2 Height Weight   -- -- -- -- -- -- -- --       Physical Exam  Vitals and nursing note reviewed. Constitutional:       General: He is not in acute distress. Appearance: Normal appearance. He is well-developed. He is not ill-appearing, toxic-appearing or diaphoretic. HENT:      Head: Normocephalic and atraumatic. Eyes:      General:         Right eye: No discharge. Left eye: No discharge. Conjunctiva/sclera: Conjunctivae normal.      Pupils: Pupils are equal, round, and reactive to light. Neck:      Trachea: Trachea normal.   Cardiovascular:      Rate and Rhythm: Normal rate and regular rhythm. Pulses: Normal pulses. Heart sounds: Normal heart sounds. Pulmonary:      Effort: Pulmonary effort is normal. No respiratory distress. Breath sounds: Normal breath sounds. No stridor. No wheezing, rhonchi or rales. Chest:      Chest wall: No tenderness. Musculoskeletal:         General: No swelling, tenderness or deformity. Normal range of motion. Cervical back: Normal range of motion and neck supple. No rigidity. No spinous process tenderness or muscular tenderness. Right lower leg: No edema. Left lower leg: No edema. Skin:     General: Skin is warm and dry.       Capillary Refill: Capillary refill takes less than 2 seconds. Coloration: Skin is not pale. Findings: No erythema or rash. Neurological:      Mental Status: He is alert and oriented to person, place, and time. GCS: GCS eye subscore is 4. GCS verbal subscore is 5. GCS motor subscore is 6. Psychiatric:         Attention and Perception: Attention and perception normal.         Mood and Affect: Affect normal. Mood is anxious. Speech: Speech normal.         Behavior: Behavior normal. Behavior is cooperative. Thought Content: Thought content is not paranoid or delusional. Thought content does not include homicidal or suicidal ideation. Thought content does not include homicidal or suicidal plan. Cognition and Memory: Cognition and memory normal.         Judgment: Judgment normal.         DIAGNOSTIC RESULTS     EKG: (none if blank)  All EKG's areinterpreted by the Emergency Department Physician who either signs or Co-signs this chart in the absence of a cardiologist.  NSR. No acute ischemic changes. Rate 75 qtc 415. RADIOLOGY: (none if blank)   Interpretationper the Radiologist below, if available at the time of this note:    No orders to display       LABS:  Labs Reviewed - No data to display    All other labs were within normal range or not returned as of this dictation. EMERGENCY DEPARTMENT COURSE and Medical Decision Making:     MDM/   Patient evaluated for panic attack. History of panic attacks and this feels just like previous. Was seen yesterday for the same thing and felt better after Ativan. Chest x-ray and troponin yesterday were negative. I do not feel repeat blood work or imaging is indicated. EKG is unremarkable. Unlikely ACS or PE. There is no tachycardia or hypoxia. He is 100% on room air. PERC negative. He also just had a CTA chest 1 month ago for chest pain that was negative for PE. Lungs are clear to auscultation bilaterally. Dose of Ativan given.   Advised he needs to speak with his family physician for any medication changes. No suicidal homicidal ideation. Stable for outpatient follow-up. Given signs and symptoms of when to return to the emergency department. Strict return precautions and follow up instructions were discussed with the patient with which the patient agrees    ED Medications administered this visit:    Medications   LORazepam (ATIVAN) tablet 1 mg (1 mg Oral Given 10/19/21 0321)       CONSULTS: (None if blank)  None    Procedures: (None if blank)       CLINICAL IMPRESSION      1. Panic attack    2.  Chest tightness          DISPOSITION/PLAN    DISPOSITION        PATIENT REFERRED TO:  Alcon Barrientos DO  [address] [facility],  [address] [phone]    Schedule an appointment as soon as possible for a visit in 2 days  If symptoms worsen return to ER      DISCHARGE MEDICATIONS:  Current Discharge Medication List                 (Please note that portions of this note were completed with a voicerecognition program.  Efforts were made to edit the dictations but occasionally words are mis-transcribed.)      Scott Sandhu DO (electronically signed)  Attending Emergency Department Provider       Scott Sandhu DO  10/19/21 5023

## 2021-10-19 NOTE — TELEPHONE ENCOUNTER
Patient would like to talk to Dr. Yesenia Garrison. He states his meds are not working. He was back in the ED last evening. Please advise.     Health Maintenance   Topic Date Due    Hepatitis C screen  Never done    HPV vaccine (1 - Male 2-dose series) Never done    COVID-19 Vaccine (1) Never done    HIV screen  Never done    Flu vaccine (1) 09/01/2021    DTaP/Tdap/Td vaccine (7 - Td or Tdap) 08/24/2022    Hepatitis B vaccine  Completed    Hib vaccine  Completed    Varicella vaccine  Completed    Meningococcal (ACWY) vaccine  Completed    Hepatitis A vaccine  Aged Out    Pneumococcal 0-64 years Vaccine  Aged Out             (applicable per patient's age: Cancer Screenings, Depression Screening, Fall Risk Screening, Immunizations)    AST (U/L)   Date Value   09/11/2021 19     ALT (U/L)   Date Value   09/11/2021 37     BUN (mg/dL)   Date Value   09/12/2021 22 (H)      (goal A1C is < 7)   (goal LDL is <100) need 30-50% reduction from baseline     BP Readings from Last 3 Encounters:   10/19/21 129/86   10/18/21 118/70   10/18/21 124/77    (goal /80)      All Future Testing planned in [facility]:  Lab Frequency Next Occurrence       Next Visit Date:  Future Appointments   Date Time Provider Carmelo Kirby   11/2/2021  8:00 AM DO Cheng Oliveira [facility]            Patient Active Problem List:     Major depressive disorder with single episode, in full remission ([facility] [address]     Migraine with aura and without status migrainosus, not intractable     Adjustment insomnia     Functional diarrhea     Panic attacks     Irritable bowel syndrome with diarrhea

## 2021-10-20 NOTE — TELEPHONE ENCOUNTER
Pt states the hydroxyzine makes him dizzy he is doing the box breathing still is not working , he would like to know if he can take the klonopin once in the morning and once before bed?  Due to not being able to sleep

## 2021-10-27 ENCOUNTER — HOSPITAL ENCOUNTER (OUTPATIENT)
Age: 21
Discharge: HOME | End: 2021-10-27
Payer: COMMERCIAL

## 2021-10-27 ENCOUNTER — OFFICE VISIT (OUTPATIENT)
Dept: FAMILY MEDICINE CLINIC | Age: 21
End: 2021-10-27
Payer: COMMERCIAL

## 2021-10-27 VITALS
BODY MASS INDEX: 30.14 KG/M2 | HEIGHT: 73 IN | SYSTOLIC BLOOD PRESSURE: 130 MMHG | WEIGHT: 227.4 LBS | HEART RATE: 110 BPM | RESPIRATION RATE: 22 BRPM | DIASTOLIC BLOOD PRESSURE: 74 MMHG | OXYGEN SATURATION: 98 %

## 2021-10-27 DIAGNOSIS — F51.02 ADJUSTMENT INSOMNIA: ICD-10-CM

## 2021-10-27 DIAGNOSIS — F41.0 PANIC ATTACKS: Primary | ICD-10-CM

## 2021-10-27 DIAGNOSIS — F41.0 PANIC ATTACKS: ICD-10-CM

## 2021-10-27 LAB
ABSOLUTE EOS #: 0.1 K/UL (ref 0–0.4)
ABSOLUTE IMMATURE GRANULOCYTE: ABNORMAL K/UL (ref 0–0.3)
ABSOLUTE LYMPH #: 1.6 K/UL (ref 1–4.8)
ABSOLUTE MONO #: 0.5 K/UL (ref 0–1)
ALBUMIN SERPL-MCNC: 4.5 G/DL (ref 3.5–5.2)
ALBUMIN/GLOBULIN RATIO: ABNORMAL (ref 1–2.5)
ALP BLD-CCNC: 125 U/L (ref 40–129)
ALT SERPL-CCNC: 17 U/L (ref 5–41)
ANION GAP SERPL CALCULATED.3IONS-SCNC: 9 MMOL/L (ref 9–17)
AST SERPL-CCNC: 18 U/L
BASOPHILS # BLD: 0 % (ref 0–2)
BASOPHILS ABSOLUTE: 0 K/UL (ref 0–0.2)
BILIRUB SERPL-MCNC: 0.31 MG/DL (ref 0.3–1.2)
BUN BLDV-MCNC: 17 MG/DL (ref 6–20)
BUN/CREAT BLD: 14 (ref 9–20)
CALCIUM SERPL-MCNC: 9.7 MG/DL (ref 8.6–10.4)
CHLORIDE BLD-SCNC: 102 MMOL/L (ref 98–107)
CO2: 28 MMOL/L (ref 20–31)
CREAT SERPL-MCNC: 1.22 MG/DL (ref 0.7–1.2)
DIFFERENTIAL TYPE: YES
EOSINOPHILS RELATIVE PERCENT: 1 % (ref 0–5)
GFR AFRICAN AMERICAN: >60 ML/MIN
GFR NON-AFRICAN AMERICAN: >60 ML/MIN
GFR SERPL CREATININE-BSD FRML MDRD: ABNORMAL ML/MIN/{1.73_M2}
GFR SERPL CREATININE-BSD FRML MDRD: ABNORMAL ML/MIN/{1.73_M2}
GLUCOSE BLD-MCNC: 88 MG/DL (ref 70–99)
HCT VFR BLD CALC: 42.1 % (ref 41–53)
HEMOGLOBIN: 14.4 G/DL (ref 13.5–17.5)
IMMATURE GRANULOCYTES: ABNORMAL %
LYMPHOCYTES # BLD: 17 % (ref 13–44)
MCH RBC QN AUTO: 28.7 PG (ref 26–34)
MCHC RBC AUTO-ENTMCNC: 34.1 G/DL (ref 31–37)
MCV RBC AUTO: 84.1 FL (ref 80–100)
MONOCYTES # BLD: 6 % (ref 5–9)
NRBC AUTOMATED: ABNORMAL PER 100 WBC
PDW BLD-RTO: 14.4 % (ref 12.1–15.2)
PLATELET # BLD: 417 K/UL (ref 140–450)
PLATELET ESTIMATE: ABNORMAL
PMV BLD AUTO: ABNORMAL FL (ref 6–12)
POTASSIUM SERPL-SCNC: 3.7 MMOL/L (ref 3.7–5.3)
RBC # BLD: 5.01 M/UL (ref 4.5–5.9)
RBC # BLD: ABNORMAL 10*6/UL
SEG NEUTROPHILS: 76 % (ref 39–75)
SEGMENTED NEUTROPHILS ABSOLUTE COUNT: 7.3 K/UL (ref 2.1–6.5)
SODIUM BLD-SCNC: 139 MMOL/L (ref 135–144)
TOTAL PROTEIN: 7.7 G/DL (ref 6.4–8.3)
WBC # BLD: 9.5 K/UL (ref 4.5–13.5)
WBC # BLD: ABNORMAL 10*3/UL

## 2021-10-27 PROCEDURE — 85025 COMPLETE CBC W/AUTO DIFF WBC: CPT

## 2021-10-27 PROCEDURE — 36415 COLL VENOUS BLD VENIPUNCTURE: CPT

## 2021-10-27 PROCEDURE — 99214 OFFICE O/P EST MOD 30 MIN: CPT | Performed by: STUDENT IN AN ORGANIZED HEALTH CARE EDUCATION/TRAINING PROGRAM

## 2021-10-27 PROCEDURE — 80053 COMPREHEN METABOLIC PANEL: CPT

## 2021-10-27 RX ORDER — ALPRAZOLAM 0.5 MG/1
0.5 TABLET ORAL NIGHTLY PRN
Qty: 30 TABLET | Refills: 0 | Status: SHIPPED | OUTPATIENT
Start: 2021-10-27 | End: 2021-11-26

## 2021-10-27 RX ORDER — TRAZODONE HYDROCHLORIDE 100 MG/1
100 TABLET ORAL NIGHTLY
Qty: 30 TABLET | Refills: 0 | Status: SHIPPED | OUTPATIENT
Start: 2021-10-27 | End: 2021-11-11 | Stop reason: SINTOL

## 2021-10-27 ASSESSMENT — PATIENT HEALTH QUESTIONNAIRE - PHQ9
SUM OF ALL RESPONSES TO PHQ9 QUESTIONS 1 & 2: 0
1. LITTLE INTEREST OR PLEASURE IN DOING THINGS: 0
SUM OF ALL RESPONSES TO PHQ QUESTIONS 1-9: 0
SUM OF ALL RESPONSES TO PHQ QUESTIONS 1-9: 0
2. FEELING DOWN, DEPRESSED OR HOPELESS: 0
SUM OF ALL RESPONSES TO PHQ QUESTIONS 1-9: 0

## 2021-10-27 ASSESSMENT — ENCOUNTER SYMPTOMS
ABDOMINAL PAIN: 0
COUGH: 0
WHEEZING: 0
VOMITING: 0
DIARRHEA: 0
SORE THROAT: 0
NAUSEA: 0
SINUS PAIN: 0
BACK PAIN: 0

## 2021-10-27 NOTE — PROGRESS NOTES
HPI Notes    Name: Memo Lees  : 2000         Chief Complaint:     Chief Complaint   Patient presents with    Anxiety     after increased Klonopin did state that it has helped him come down from his panic attack but c/o  SE from  [address] off balance, shakey, SOB , seeing Double,     Insomnia     states he is still unable to sleep     Chest Pain       History of Present Illness:      HPI    This is a 24year old man presenting for follow up for treatment of insomnia, panic attacks. He states this past month, his life has been \"hell\". He is reporting that he is having daily panic attacks, but they vary in severity. He gets chest discomfort, seeing double, feels very dizzy, SOA every day. He reports that clonazepam has actually made these symptoms worse and he has stopped taking this two days ago. He reports that these symptoms are still present, but not as severe since stopping the clonazepam. He continues to utilize his fluoxetine every day. He reports \"it just feels like I'm sick every day. I'm at work, with my mind occupied, in a nonstressful situation and I'll tip into a panic attack. I can't finish shifts at work. Sometimes I feel like I can't even get my words out\". He still has marked difficulty with insomnia, both sleep onset and maintenance. He is practicing good sleep hygiene as well. He has not started any new herbal supplements, or OTC medications.      Past Medical History:     Past Medical History:   Diagnosis Date    IBS (irritable bowel syndrome)     Insomnia     Major depressive disorder     Migraine     Panic attacks 2021      Reviewed all health maintenance requirements and ordered appropriate tests  Health Maintenance Due   Topic Date Due    Hepatitis C screen  Never done    HPV vaccine (1 - Male 2-dose series) Never done    COVID-19 Vaccine (1) Never done    HIV screen  Never done    Flu vaccine (1) 2021       Past Surgical History:     Past Surgical History:   Procedure Laterality Date    TONSILLECTOMY          Medications:       Prior to Admission medications    Medication Sig Start Date End Date Taking? Authorizing Provider   traZODone (DESYREL) 100 MG tablet Take 1 tablet by mouth nightly 10/27/21  Yes Gely Pena DO   ALPRAZolam Otelia Adjutant) 0.5 MG tablet Take 1 tablet by mouth nightly as needed for Sleep for up to 30 days. 10/27/21 11/26/21 Yes Gely Pena DO   FLUoxetine (PROZAC) 40 MG capsule Take 1 capsule by mouth daily 10/18/21  Yes Evgeny Zimmer DO        Allergies:       Patient has no known allergies. Social History:     Tobacco:    reports that he has never smoked. He has never used smokeless tobacco.  Alcohol:      reports no history of alcohol use. Drug Use:  reports no history of drug use. Family History:     No family history on file. Review of Systems:         Review of Systems   Constitutional: Negative for fever. HENT: Negative for sinus pain, sneezing and sore throat. Respiratory: Negative for cough and wheezing. Cardiovascular: Negative for chest pain. Gastrointestinal: Negative for abdominal pain, diarrhea, nausea and vomiting. Musculoskeletal: Negative for back pain. Skin: Negative for rash. Neurological: Positive for dizziness, light-headedness and headaches. Psychiatric/Behavioral: Positive for sleep disturbance. Physical Exam:     Vitals:  /74 (Site: Left Upper Arm, Position: Sitting, Cuff Size: Medium Adult)   Pulse 110   Resp 22   Ht 6' 1\" (1.854 m)   Wt 227 lb 6.4 oz (103.1 kg)   SpO2 98%   BMI 30.00 kg/m²       Physical Exam  Vitals and nursing note reviewed. Constitutional:       General: He is not in acute distress. Appearance: Normal appearance. He is obese. Musculoskeletal:         General: No swelling or tenderness. Normal range of motion. Skin:     General: Skin is warm and dry. Capillary Refill: Capillary refill takes less than 2 seconds. Neurological:      General: No focal deficit present. Mental Status: He is alert and oriented to person, place, and time. Mental status is at baseline. Psychiatric:         Mood and Affect: Mood and affect normal.         Speech: Speech normal.         Behavior: Behavior normal.         Thought Content: Thought content normal.                 Data:     Lab Results   Component Value Date     10/27/2021    K 3.7 10/27/2021     10/27/2021    CO2 28 10/27/2021    BUN 17 10/27/2021    CREATININE 1.22 10/27/2021    GLUCOSE 88 10/27/2021    GLUCOSE 93 11/25/2011    PROT 7.7 10/27/2021    LABALBU 4.5 10/27/2021    BILITOT 0.31 10/27/2021    ALKPHOS 125 10/27/2021    AST 18 10/27/2021    ALT 17 10/27/2021     Lab Results   Component Value Date    WBC 9.5 10/27/2021    RBC 5.01 10/27/2021    RBC 4.52 11/25/2011    HGB 14.4 10/27/2021    HCT 42.1 10/27/2021    MCV 84.1 10/27/2021    MCH 28.7 10/27/2021    MCHC 34.1 10/27/2021    RDW 14.4 10/27/2021     10/27/2021     11/25/2011    MPV NOT REPORTED 10/27/2021     Lab Results   Component Value Date    TSH 0.81 05/27/2021     No results found for: CHOL, HDL, PSA, LABA1C       Assessment & Plan        Diagnosis Orders   1. Panic attacks  Comprehensive Metabolic Panel    CBC Auto Differential    traZODone (DESYREL) 100 MG tablet    ALPRAZolam (XANAX) 0.5 MG tablet   2. Adjustment insomnia  traZODone (DESYREL) 100 MG tablet    ALPRAZolam (XANAX) 0.5 MG tablet       1. I would recommend discontinuing clonazepam.  Would recommend adding trazodone 100 mg p.o. nightly to his current regimen, as this would have adjunctive anxiolysis, antidepressive effects as well as being useful for treating his insomnia. The patient I had a long discussion about starting trazodone. We discussed its mechanism of action, intended goals, adverse effects, as well as common side effects. They were able to verbalize understanding, and repeat plan back to me.     I would recommend returning to Xanax at a slightly higher dose of 0.5 mg as needed for severe panic attacks. Return to office in 1 week      Completed Refills   Requested Prescriptions     Signed Prescriptions Disp Refills    traZODone (DESYREL) 100 MG tablet 30 tablet 0     Sig: Take 1 tablet by mouth nightly    ALPRAZolam (XANAX) 0.5 MG tablet 30 tablet 0     Sig: Take 1 tablet by mouth nightly as needed for Sleep for up to 30 days. Return in about 1 week (around 11/3/2021) for Panic attacks. Orders Placed This Encounter   Medications    traZODone (DESYREL) 100 MG tablet     Sig: Take 1 tablet by mouth nightly     Dispense:  30 tablet     Refill:  0    ALPRAZolam (XANAX) 0.5 MG tablet     Sig: Take 1 tablet by mouth nightly as needed for Sleep for up to 30 days. Dispense:  30 tablet     Refill:  0     Orders Placed This Encounter   Procedures    Comprehensive Metabolic Panel     Standing Status:   Future     Number of Occurrences:   1     Standing Expiration Date:   10/27/2022    CBC Auto Differential     Standing Status:   Future     Number of Occurrences:   1     Standing Expiration Date:   10/27/2022         Patient Instructions   Chance,  We've re-calibrated your regimen for the panic attacks. Take prozac every day 40mg  START trazodone every evening 30min before sleep. Use box breathing for smaller panic attacks. Take 0.5mg Xanax as needed for a severe panic attack. Get labs done today. Come back in 1 week. Dr. Ruba Ocampo:    Sonyarm Murillo may be receiving a survey from [employer] regarding your visit today. Please complete the survey to enable us to provide the highest quality of care to you and your family. If you cannot score us a very good on any question, please call the office to discuss how we could of made your experience a very good one. Thank you.       Clinical Care Team:     RAMESH Green      ClericalTeam:     Han Pittman Anthony      Electronically signed by Susan Santos DO on 10/28/2021 at 1:41 PM           Completed Refills   Requested Prescriptions     Signed Prescriptions Disp Refills    traZODone (DESYREL) 100 MG tablet 30 tablet 0     Sig: Take 1 tablet by mouth nightly    ALPRAZolam (XANAX) 0.5 MG tablet 30 tablet 0     Sig: Take 1 tablet by mouth nightly as needed for Sleep for up to 30 days. Chance received counseling on the following healthy behaviors: nutrition, exercise and medication adherence  Reviewed prior labs and health maintenance. Continue current medications, diet and exercise. Discussed use, benefit, and side effects of prescribed medications. Barriers to medication compliance addressed. Patient given educational materials - see patient instructions. All patient questions answered. Patient voiced understanding.

## 2021-11-04 ENCOUNTER — OFFICE VISIT (OUTPATIENT)
Dept: FAMILY MEDICINE CLINIC | Age: 21
End: 2021-11-04
Payer: COMMERCIAL

## 2021-11-04 ENCOUNTER — HOSPITAL ENCOUNTER (OUTPATIENT)
Age: 21
Discharge: HOME | End: 2021-11-04
Payer: COMMERCIAL

## 2021-11-04 VITALS
BODY MASS INDEX: 30.75 KG/M2 | WEIGHT: 232 LBS | DIASTOLIC BLOOD PRESSURE: 84 MMHG | HEART RATE: 88 BPM | OXYGEN SATURATION: 99 % | RESPIRATION RATE: 20 BRPM | HEIGHT: 73 IN | SYSTOLIC BLOOD PRESSURE: 110 MMHG

## 2021-11-04 DIAGNOSIS — H65.92 OME (OTITIS MEDIA WITH EFFUSION), LEFT: ICD-10-CM

## 2021-11-04 DIAGNOSIS — F51.02 ADJUSTMENT INSOMNIA: ICD-10-CM

## 2021-11-04 DIAGNOSIS — R63.1 POLYDIPSIA: ICD-10-CM

## 2021-11-04 DIAGNOSIS — R68.2 DRY MOUTH: ICD-10-CM

## 2021-11-04 DIAGNOSIS — F32.5 MAJOR DEPRESSIVE DISORDER WITH SINGLE EPISODE, IN FULL REMISSION (HCC): ICD-10-CM

## 2021-11-04 DIAGNOSIS — F41.0 PANIC ATTACKS: Primary | ICD-10-CM

## 2021-11-04 DIAGNOSIS — R35.89 POLYURIA: ICD-10-CM

## 2021-11-04 DIAGNOSIS — R53.83 FATIGUE, UNSPECIFIED TYPE: ICD-10-CM

## 2021-11-04 LAB
HBA1C MFR BLD: 5.2 %
PROLACTIN: 0.2 UG/L (ref 4.04–15.2)
TSH SERPL DL<=0.05 MIU/L-ACNC: 0.88 MIU/L (ref 0.3–5)

## 2021-11-04 PROCEDURE — 84146 ASSAY OF PROLACTIN: CPT

## 2021-11-04 PROCEDURE — 84443 ASSAY THYROID STIM HORMONE: CPT

## 2021-11-04 PROCEDURE — 36415 COLL VENOUS BLD VENIPUNCTURE: CPT

## 2021-11-04 PROCEDURE — 83036 HEMOGLOBIN GLYCOSYLATED A1C: CPT | Performed by: STUDENT IN AN ORGANIZED HEALTH CARE EDUCATION/TRAINING PROGRAM

## 2021-11-04 PROCEDURE — 99214 OFFICE O/P EST MOD 30 MIN: CPT | Performed by: STUDENT IN AN ORGANIZED HEALTH CARE EDUCATION/TRAINING PROGRAM

## 2021-11-04 ASSESSMENT — ENCOUNTER SYMPTOMS
ABDOMINAL PAIN: 0
DIARRHEA: 0
WHEEZING: 0
VOMITING: 0
SINUS PAIN: 0
COUGH: 0
SORE THROAT: 0
NAUSEA: 0
BACK PAIN: 0

## 2021-11-04 NOTE — PROGRESS NOTES
Date Taking? Authorizing Provider   traZODone (DESYREL) 100 MG tablet Take 1 tablet by mouth nightly 10/27/21  Yes Joana Pena DO   ALPRAZolam Lucio Rising) 0.5 MG tablet Take 1 tablet by mouth nightly as needed for Sleep for up to 30 days. 10/27/21 11/26/21 Yes Joana Pena DO   FLUoxetine (PROZAC) 40 MG capsule Take 1 capsule by mouth daily 10/18/21  Yes Cornelio Sanchez DO        Allergies:       Patient has no known allergies. Social History:     Tobacco:    reports that he has never smoked. He has never used smokeless tobacco.  Alcohol:      reports no history of alcohol use. Drug Use:  reports no history of drug use. Family History:     No family history on file. Review of Systems:         Review of Systems   Constitutional: Positive for fatigue. Negative for fever. HENT: Positive for ear pain. Negative for ear discharge, sinus pain, sneezing and sore throat. Respiratory: Negative for cough and wheezing. Gastrointestinal: Negative for abdominal pain, diarrhea, nausea and vomiting. Endocrine: Positive for polydipsia and polyuria. Musculoskeletal: Negative for back pain. Skin: Negative for rash. Neurological: Positive for headaches. Hematological: Negative for adenopathy. Psychiatric/Behavioral: Positive for sleep disturbance. The patient is not nervous/anxious. Physical Exam:     Vitals:  BP 90/80 (Site: Left Upper Arm, Position: Standing, Cuff Size: Large Adult)   Pulse 88   Resp 20   Ht 6' 1\" (1.854 m)   Wt 232 lb (105.2 kg)   SpO2 99%   BMI 30.61 kg/m²       Physical Exam  Vitals and nursing note reviewed. Constitutional:       General: He is not in acute distress. Appearance: Normal appearance. He is normal weight. HENT:      Right Ear: Tympanic membrane, ear canal and external ear normal. There is no impacted cerumen. Left Ear: Ear canal and external ear normal. There is no impacted cerumen.       Ears:      Comments: OME left Mouth/Throat:      Mouth: Mucous membranes are moist.      Pharynx: Oropharynx is clear. No oropharyngeal exudate or posterior oropharyngeal erythema. Musculoskeletal:         General: No swelling or tenderness. Normal range of motion. Skin:     General: Skin is warm and dry. Capillary Refill: Capillary refill takes less than 2 seconds. Neurological:      General: No focal deficit present. Mental Status: He is alert and oriented to person, place, and time. Mental status is at baseline. GCS: GCS eye subscore is 4. GCS verbal subscore is 5. GCS motor subscore is 6. Cranial Nerves: No cranial nerve deficit. Psychiatric:         Mood and Affect: Mood normal.         Behavior: Behavior normal.         Thought Content: Thought content normal.                 Data:     Lab Results   Component Value Date     10/27/2021    K 3.7 10/27/2021     10/27/2021    CO2 28 10/27/2021    BUN 17 10/27/2021    CREATININE 1.22 10/27/2021    GLUCOSE 88 10/27/2021    GLUCOSE 93 11/25/2011    PROT 7.7 10/27/2021    LABALBU 4.5 10/27/2021    BILITOT 0.31 10/27/2021    ALKPHOS 125 10/27/2021    AST 18 10/27/2021    ALT 17 10/27/2021     Lab Results   Component Value Date    WBC 9.5 10/27/2021    RBC 5.01 10/27/2021    RBC 4.52 11/25/2011    HGB 14.4 10/27/2021    HCT 42.1 10/27/2021    MCV 84.1 10/27/2021    MCH 28.7 10/27/2021    MCHC 34.1 10/27/2021    RDW 14.4 10/27/2021     10/27/2021     11/25/2011    MPV NOT REPORTED 10/27/2021     Lab Results   Component Value Date    TSH 0.81 05/27/2021     No results found for: CHOL, HDL, PSA, LABA1C       Assessment & Plan        Diagnosis Orders   1. Panic attacks     2. Adjustment insomnia     3. Major depressive disorder with single episode, in full remission ([facility] [address]     4. OME (otitis media with effusion), left     5. Polydipsia  TSH With Reflex Ft4    POCT glycosylated hemoglobin (Hb A1C)    Prolactin   6.  Polyuria  TSH With Reflex Ft4    POCT glycosylated hemoglobin (Hb A1C)    Prolactin   7. Fatigue, unspecified type  TSH With Reflex Ft4    POCT glycosylated hemoglobin (Hb A1C)    Prolactin   8. Dry mouth  TSH With Reflex Ft4    POCT glycosylated hemoglobin (Hb A1C)    Prolactin       1. This problem seems to be improving, would recommend continued fluoxetine, trazodone with as needed use of Xanax. Continue to monitor frequency of panic attacks, will readdress in 2 weeks. 4. I believe a lot of his other symptoms are due to otitis media with effusion, which is clinically evident on otoscopic examination. This could certainly explain his headache, disequilibrium. I would recommend he take cetirizinepseudoephedrine once a day for 14 days and come for reevaluation. 5.--8. His polydipsia seems to be in response to his dry mouth. This may be a medication side effect, but I believe would be beneficial to exclude prolactinoma, thyroid disorder, or diabetes mellitus. His modest hypotension could be also a side effect of the trazodone. If laboratory studies are within normal limits, would recommend discontinuing trazodone, and exchanging it for a different nightly anxiolytic/sleep aid. Follow-up in 2 weeks    Update: Hemoglobin A1c is 5.2%, diabetes mellitus has been excluded    Completed Refills   Requested Prescriptions      No prescriptions requested or ordered in this encounter     Return in about 2 weeks (around 11/18/2021) for OME left, headaches, poss med SE. No orders of the defined types were placed in this encounter. Orders Placed This Encounter   Procedures    TSH With Reflex Ft4     Standing Status:   Future     Standing Expiration Date:   11/4/2022    Prolactin     Standing Status:   Future     Standing Expiration Date:   11/4/2022    POCT glycosylated hemoglobin (Hb A1C)         Patient Instructions   Chance,     There are several things going on. Your anxiety seems to have improved. I think you have a middle ear effusion. This is likely causing a lot of your head symptoms. Take OTC zyrtec-D once a day for 14 days and come back to see me. I'm checking hormone labs to make sure we don't have thyroid problems, Diabetes or a brain tumor that makes prolactin. If this is all normal and your cotton mouth doesn't go away, it's probably the trazodone. Dr. Radha Martin:    Ambreen Acosta may be receiving a survey from [employer] regarding your visit today. Please complete the survey to enable us to provide the highest quality of care to you and your family. If you cannot score us a very good on any question, please call the office to discuss how we could of made your experience a very good one. Thank you. Clinical Care Team:     RAMESH Mccullough      ClericalTeam:     Ehsan Quintanilla      Electronically signed by Bruna Gallagher DO on 11/4/2021 at 8:37 AM           Completed Refills   Requested Prescriptions      No prescriptions requested or ordered in this encounter         Chance received counseling on the following healthy behaviors: nutrition, exercise and medication adherence  Reviewed prior labs and health maintenance. Continue current medications, diet and exercise. Discussed use, benefit, and side effects of prescribed medications. Barriers to medication compliance addressed. Patient given educational materials - see patient instructions. All patient questions answered. Patient voiced understanding.

## 2021-11-04 NOTE — PATIENT INSTRUCTIONS
Chance,     There are several things going on. Your anxiety seems to have improved. I think you have a middle ear effusion. This is likely causing a lot of your head symptoms. Take OTC zyrtec-D once a day for 14 days and come back to see me. I'm checking hormone labs to make sure we don't have thyroid problems, Diabetes or a brain tumor that makes prolactin. If this is all normal and your cotton mouth doesn't go away, it's probably the trazodone. Dr. Zenobia Mccormick:    Alicia Rowland may be receiving a survey from [employer] regarding your visit today. Please complete the survey to enable us to provide the highest quality of care to you and your family. If you cannot score us a very good on any question, please call the office to discuss how we could of made your experience a very good one. Thank you.       Clinical Care Team:     RAMESH Sykes      ClericalTeam:     [id number] [facility]

## 2021-11-11 ENCOUNTER — OFFICE VISIT (OUTPATIENT)
Dept: FAMILY MEDICINE CLINIC | Age: 21
End: 2021-11-11
Payer: COMMERCIAL

## 2021-11-11 VITALS
HEIGHT: 73 IN | SYSTOLIC BLOOD PRESSURE: 122 MMHG | DIASTOLIC BLOOD PRESSURE: 80 MMHG | RESPIRATION RATE: 18 BRPM | WEIGHT: 225.2 LBS | HEART RATE: 94 BPM | OXYGEN SATURATION: 100 % | BODY MASS INDEX: 29.85 KG/M2

## 2021-11-11 DIAGNOSIS — F41.9 ANXIETY: ICD-10-CM

## 2021-11-11 DIAGNOSIS — R11.0 FUNCTIONAL NAUSEA: ICD-10-CM

## 2021-11-11 DIAGNOSIS — F33.1 MODERATE EPISODE OF RECURRENT MAJOR DEPRESSIVE DISORDER (HCC): Primary | ICD-10-CM

## 2021-11-11 PROBLEM — F32.1 CURRENT MODERATE EPISODE OF MAJOR DEPRESSIVE DISORDER WITHOUT PRIOR EPISODE (HCC): Status: ACTIVE | Noted: 2021-04-08

## 2021-11-11 PROCEDURE — 99214 OFFICE O/P EST MOD 30 MIN: CPT | Performed by: STUDENT IN AN ORGANIZED HEALTH CARE EDUCATION/TRAINING PROGRAM

## 2021-11-11 RX ORDER — ONDANSETRON 4 MG/1
4 TABLET, FILM COATED ORAL 3 TIMES DAILY PRN
Qty: 30 TABLET | Refills: 0 | Status: SHIPPED | OUTPATIENT
Start: 2021-11-11 | End: 2021-12-02

## 2021-11-11 ASSESSMENT — ENCOUNTER SYMPTOMS
WHEEZING: 0
ABDOMINAL PAIN: 0
DIARRHEA: 0
VOMITING: 0
COUGH: 0
BACK PAIN: 0
NAUSEA: 1
SORE THROAT: 0
SINUS PAIN: 0

## 2021-11-11 NOTE — PATIENT INSTRUCTIONS
Chance,  Start Viibryd 10mg ounce a day for 7 days then 20mg a day after that. Keep taking your Prozac every day for this week. After that, take 20mg once a day for 1 week  Then take 20mg once every other day and STOP after 2 weeks    SURVEY:    You may be receiving a survey from [employer] regarding your visit today. Please complete the survey to enable us to provide the highest quality of care to you and your family. If you cannot score us a very good on any question, please call the office to discuss how we could of made your experience a very good one. Thank you.       Clinical Care Team:     Dr. Elis Rojas, [facility]      ClericalTeam:     [id number] [facility]

## 2021-11-11 NOTE — PROGRESS NOTES
HPI Notes    Name: Judith Logan  : 2000         Chief Complaint:     Chief Complaint   Patient presents with    Anxiety     Pt states he is struggling geeling like is is going to vomit, still has chest pain and fatigue     Medication Problem     feels as if no medication is working        History of Present Illness:      HPI    This is a 77-year-old man presenting for reevaluation of symptoms of anxiety, worsening mental health symptoms. Current regimen includes fluoxetine 40 mg p.o. daily, Xanax 0.5 mg p.o. nightly as needed. We recently discontinued trazodone, as this was causing marked side effects, which then resolved after discontinuation. Previous therapies for anxiolysis have also included Atarax, as well as Klonopin; both of which were discontinued due to side effects. Since our previous office visit, he has left several messages on my chart stating that he has noticed a decrease in frequency of his panic attacks, but still feels some underlying level of anxiety throughout the day and is desiring this feeling to go away. He reports he feels more or less constant nausea, chest discomfort, fatigue, headaches, and feels \"like none of my medications are working\". He is no longer seeing counseling, stating \"they just kept repeating themselves\". He reports that the dry mouth, dizziness, ear pain has resolved since stopping trazodone. This is so distressing for him that he is reporting feeling so defeated and his mood is very low, he is having difficulty sleeping, anhedonia, low appetite, withdrawing from family and romantic relationships. He vehemently denies suicidal ideation.      Past Medical History:     Past Medical History:   Diagnosis Date    IBS (irritable bowel syndrome)     Insomnia     Major depressive disorder     Migraine     Panic attacks 2021      Reviewed all health maintenance requirements and ordered appropriate tests  Health Maintenance Due   Topic Date Due    Hepatitis C screen  Never done    HPV vaccine (1 - Male 2-dose series) Never done    COVID-19 Vaccine (1) Never done    HIV screen  Never done    Flu vaccine (1) 09/01/2021       Past Surgical History:     Past Surgical History:   Procedure Laterality Date    TONSILLECTOMY          Medications:       Prior to Admission medications    Medication Sig Start Date End Date Taking? Authorizing Provider   ondansetron (ZOFRAN) 4 MG tablet Take 1 tablet by mouth 3 times daily as needed for Nausea or Vomiting 11/11/21  Yes Natty Pena DO   Vilazodone HCl 10 & 20 MG KIT Take one 10 mg tab by mouth once a day for a week, then take one 20mg tab once a day by mouth after that 11/11/21  Yes Cora Pastrana DO   ALPRAZolam Orin Jensen) 0.5 MG tablet Take 1 tablet by mouth nightly as needed for Sleep for up to 30 days. 10/27/21 11/26/21 Yes Natty Pena DO   FLUoxetine (PROZAC) 40 MG capsule Take 1 capsule by mouth daily 10/18/21  Yes Cora Pastrana DO        Allergies:       Patient has no known allergies. Social History:     Tobacco:    reports that he has never smoked. He has never used smokeless tobacco.  Alcohol:      reports no history of alcohol use. Drug Use:  reports no history of drug use. Family History:     No family history on file. Review of Systems:         Review of Systems   Constitutional: Negative for chills and fever. HENT: Negative for sinus pain, sneezing and sore throat. Respiratory: Negative for cough and wheezing. Cardiovascular: Negative for chest pain. Gastrointestinal: Positive for nausea. Negative for abdominal pain, diarrhea and vomiting. Musculoskeletal: Negative for back pain. Skin: Negative for rash. Psychiatric/Behavioral: Positive for decreased concentration, dysphoric mood and sleep disturbance. Negative for behavioral problems and suicidal ideas. The patient is nervous/anxious.             Physical Exam:     Vitals:  /80 (Site: Left Upper Arm, Position: Sitting, Cuff Size: Large Adult)   Pulse 94   Resp 18   Ht 6' 1\" (1.854 m)   Wt 225 lb 3.2 oz (102.2 kg)   SpO2 100%   BMI 29.71 kg/m²       Physical Exam  Vitals and nursing note reviewed. Constitutional:       General: He is not in acute distress. Appearance: Normal appearance. Musculoskeletal:         General: No swelling or tenderness. Normal range of motion. Skin:     General: Skin is warm and dry. Capillary Refill: Capillary refill takes less than 2 seconds. Neurological:      General: No focal deficit present. Mental Status: He is alert and oriented to person, place, and time. Mental status is at baseline. Psychiatric:         Attention and Perception: Attention and perception normal.         Mood and Affect: Mood is depressed. Affect is blunt. Behavior: Behavior is withdrawn. Thought Content: Thought content normal.         Cognition and Memory: Cognition and memory normal.                 Data:     Lab Results   Component Value Date     10/27/2021    K 3.7 10/27/2021     10/27/2021    CO2 28 10/27/2021    BUN 17 10/27/2021    CREATININE 1.22 10/27/2021    GLUCOSE 88 10/27/2021    GLUCOSE 93 11/25/2011    PROT 7.7 10/27/2021    LABALBU 4.5 10/27/2021    BILITOT 0.31 10/27/2021    ALKPHOS 125 10/27/2021    AST 18 10/27/2021    ALT 17 10/27/2021     Lab Results   Component Value Date    WBC 9.5 10/27/2021    RBC 5.01 10/27/2021    RBC 4.52 11/25/2011    HGB 14.4 10/27/2021    HCT 42.1 10/27/2021    MCV 84.1 10/27/2021    MCH 28.7 10/27/2021    MCHC 34.1 10/27/2021    RDW 14.4 10/27/2021     10/27/2021     11/25/2011    MPV NOT REPORTED 10/27/2021     Lab Results   Component Value Date    TSH 0.88 11/04/2021     Lab Results   Component Value Date    LABA1C 5.2 11/04/2021          Assessment & Plan        Diagnosis Orders   1.  Moderate episode of recurrent major depressive disorder [facility])  External Referral To Counseling Services Vilazodone HCl 10 & 20 MG KIT   2. Anxiety  External Referral To Counseling Services    Vilazodone HCl 10 & 20 MG KIT   3. Functional nausea  ondansetron (ZOFRAN) 4 MG tablet       1. Based on history, interview, I had certainly do think he meets DSM-V criteria for recurrent major depression. Symptoms may have been unreported in previous encounters due to his want for Prozac to be a beneficial medication for him. In any case, I do think he would still benefit from CBT, or DBT and referred him to a new counseling facility. I believe that we should at first augment then discontinue Prozac with Viibryd. Prozac will be discontinued over a 3-week taper, which I have included instructions in his after visit summary. The patient I had a long discussion about starting Viibryd. We discussed its mechanism of action, intended goals, adverse effects, as well as common side effects. They were able to verbalize understanding, and repeat plan back to me. 3.  This is likely somatizations, but I do feel treating it symptomatically would be beneficial while the underlying problem is addressed with his psychiatric medication. Follow up in 3 weeks          Completed Refills   Requested Prescriptions     Signed Prescriptions Disp Refills    ondansetron (ZOFRAN) 4 MG tablet 30 tablet 0     Sig: Take 1 tablet by mouth 3 times daily as needed for Nausea or Vomiting    Vilazodone HCl 10 & 20 MG KIT 1 kit 0     Sig: Take one 10 mg tab by mouth once a day for a week, then take one 20mg tab once a day by mouth after that     Return in about 3 weeks (around 12/2/2021) for Mental Health.   Orders Placed This Encounter   Medications    ondansetron (ZOFRAN) 4 MG tablet     Sig: Take 1 tablet by mouth 3 times daily as needed for Nausea or Vomiting     Dispense:  30 tablet     Refill:  0    Vilazodone HCl 10 & 20 MG KIT     Sig: Take one 10 mg tab by mouth once a day for a week, then take one 20mg tab once a day by mouth after that     Dispense:  1 kit     Refill:  0     Orders Placed This Encounter   Procedures    External Referral To Counseling Services     Referral Priority:   Routine     Referral Type:   Eval and Treat     Referral Reason:   Specialty Services Required     Requested Specialty:   Clinical Counselor     Number of Visits Requested:   1         Patient Instructions   Sabas,  Start Viibryd 10mg ounce a day for 7 days then 20mg a day after that. Keep taking your Prozac every day for this week. After that, take 20mg once a day for 1 week  Then take 20mg once every other day and STOP after 2 weeks    SURVEY:    You may be receiving a survey from [employer] regarding your visit today. Please complete the survey to enable us to provide the highest quality of care to you and your family. If you cannot score us a very good on any question, please call the office to discuss how we could of made your experience a very good one. Thank you. Clinical Care Team:     Dr. Jignesh Hirsch, [facility]      ClericalTeam:     Tobi Perez      Electronically signed by Amanda Moran DO on 11/11/2021 at 9:13 AM           Completed Refills   Requested Prescriptions     Signed Prescriptions Disp Refills    ondansetron (ZOFRAN) 4 MG tablet 30 tablet 0     Sig: Take 1 tablet by mouth 3 times daily as needed for Nausea or Vomiting    Vilazodone HCl 10 & 20 MG KIT 1 kit 0     Sig: Take one 10 mg tab by mouth once a day for a week, then take one 20mg tab once a day by mouth after that         Sabas received counseling on the following healthy behaviors: nutrition, exercise and medication adherence  Reviewed prior labs and health maintenance. Continue current medications, diet and exercise. Discussed use, benefit, and side effects of prescribed medications. Barriers to medication compliance addressed. Patient given educational materials - see patient instructions.     All patient questions answered. Patient voiced understanding.

## 2021-11-12 ENCOUNTER — TELEPHONE (OUTPATIENT)
Dept: FAMILY MEDICINE CLINIC | Age: 21
End: 2021-11-12

## 2021-11-12 NOTE — TELEPHONE ENCOUNTER
----- Message from [facility] (ARIELA FAJARDO) sent at 11/12/2021  1:37 PM EST -----  Subject: Message to Provider    QUESTIONS  Information for Provider? pt is calling stating that the [employer] need a PA   and would like a call back   ---------------------------------------------------------------------------  --------------  CALL BACK INFO  What is the best way for the office to contact you? OK to leave message on   voicemail  Preferred Call Back Phone Number? [phone]  ---------------------------------------------------------------------------  --------------  SCRIPT ANSWERS  Relationship to Patient?  Self

## 2021-11-15 ENCOUNTER — PATIENT MESSAGE (OUTPATIENT)
Dept: FAMILY MEDICINE CLINIC | Age: 21
End: 2021-11-15

## 2021-11-15 DIAGNOSIS — R45.89 DEPRESSED MOOD: ICD-10-CM

## 2021-11-15 DIAGNOSIS — F33.1 MODERATE EPISODE OF RECURRENT MAJOR DEPRESSIVE DISORDER (HCC): ICD-10-CM

## 2021-11-16 RX ORDER — FLUOXETINE HYDROCHLORIDE 20 MG/1
CAPSULE ORAL
Qty: 30 CAPSULE | Refills: 0 | Status: SHIPPED | OUTPATIENT
Start: 2021-11-16 | End: 2021-12-02

## 2021-11-16 NOTE — TELEPHONE ENCOUNTER
Patient called today requesting his medication change be called in so he can start weaning off of Prozac - patient uses [facility] Maintenance   Topic Date Due    Hepatitis C screen  Never done    HPV vaccine (1 - Male 2-dose series) Never done    COVID-19 Vaccine (1) Never done    HIV screen  Never done    Flu vaccine (1) 09/01/2021    DTaP/Tdap/Td vaccine (7 - Td or Tdap) 08/24/2022    Hepatitis B vaccine  Completed    Hib vaccine  Completed    Varicella vaccine  Completed    Meningococcal (ACWY) vaccine  Completed    Hepatitis A vaccine  Aged Out    Pneumococcal 0-64 years Vaccine  Aged Out             (applicable per patient's age: Cancer Screenings, Depression Screening, Fall Risk Screening, Immunizations)    Hemoglobin A1C (%)   Date Value   11/04/2021 5.2     AST (U/L)   Date Value   10/27/2021 18     ALT (U/L)   Date Value   10/27/2021 17     BUN (mg/dL)   Date Value   10/27/2021 17      (goal A1C is < 7)   (goal LDL is <100) need 30-50% reduction from baseline     BP Readings from Last 3 Encounters:   11/11/21 122/80   11/04/21 110/84   10/27/21 130/74    (goal /80)      All Future Testing planned in [facility]:  Lab Frequency Next Occurrence       Next Visit Date:  Future Appointments   Date Time Provider Carmelo Kirby   12/2/2021  8:00 AM [facility]            Patient Active Problem List:     Current moderate episode of major depressive disorder without prior episode ([address]     Migraine with aura and without status migrainosus, not intractable     Adjustment insomnia     Functional diarrhea     Panic attacks     Irritable bowel syndrome with diarrhea

## 2021-12-02 ENCOUNTER — OFFICE VISIT (OUTPATIENT)
Dept: FAMILY MEDICINE CLINIC | Age: 21
End: 2021-12-02
Payer: COMMERCIAL

## 2021-12-02 VITALS
BODY MASS INDEX: 30.03 KG/M2 | WEIGHT: 226.6 LBS | DIASTOLIC BLOOD PRESSURE: 70 MMHG | HEART RATE: 90 BPM | SYSTOLIC BLOOD PRESSURE: 110 MMHG | RESPIRATION RATE: 20 BRPM | OXYGEN SATURATION: 99 % | HEIGHT: 73 IN

## 2021-12-02 DIAGNOSIS — F32.1 CURRENT MODERATE EPISODE OF MAJOR DEPRESSIVE DISORDER WITHOUT PRIOR EPISODE (HCC): Primary | ICD-10-CM

## 2021-12-02 DIAGNOSIS — F41.0 PANIC ATTACKS: ICD-10-CM

## 2021-12-02 PROCEDURE — 99214 OFFICE O/P EST MOD 30 MIN: CPT | Performed by: STUDENT IN AN ORGANIZED HEALTH CARE EDUCATION/TRAINING PROGRAM

## 2021-12-02 RX ORDER — VENLAFAXINE HYDROCHLORIDE 37.5 MG/1
CAPSULE, EXTENDED RELEASE ORAL
Qty: 30 CAPSULE | Refills: 0 | Status: SHIPPED | OUTPATIENT
Start: 2021-12-02 | End: 2022-01-17

## 2021-12-02 RX ORDER — VENLAFAXINE HYDROCHLORIDE 75 MG/1
75 CAPSULE, EXTENDED RELEASE ORAL DAILY
Qty: 90 CAPSULE | Refills: 1 | Status: SHIPPED | OUTPATIENT
Start: 2021-12-02 | End: 2022-06-08 | Stop reason: SDUPTHER

## 2021-12-02 RX ORDER — ALPRAZOLAM 0.5 MG/1
0.5 TABLET ORAL NIGHTLY PRN
Qty: 30 TABLET | Refills: 0 | Status: SHIPPED | OUTPATIENT
Start: 2021-12-02 | End: 2022-02-18 | Stop reason: SDUPTHER

## 2021-12-02 RX ORDER — NAPROXEN SODIUM 550 MG/1
550 TABLET ORAL 2 TIMES DAILY WITH MEALS
COMMUNITY
End: 2022-07-15 | Stop reason: ALTCHOICE

## 2021-12-02 ASSESSMENT — ENCOUNTER SYMPTOMS
SINUS PAIN: 0
VOMITING: 0
SORE THROAT: 0
WHEEZING: 0
DIARRHEA: 0
NAUSEA: 0
ABDOMINAL PAIN: 0
COUGH: 0

## 2021-12-02 NOTE — PATIENT INSTRUCTIONS
SURVEY:    You may be receiving a survey from [employer] regarding your visit today. Please complete the survey to enable us to provide the highest quality of care to you and your family. If you cannot score us a very good on any question, please call the office to discuss how we could of made your experience a very good one. Thank you.       Clinical Care Team:     Dr. Juan Garcia, [facility]      [facility]     [id number] [facility]

## 2021-12-02 NOTE — PROGRESS NOTES
HPI Notes    Name: Sakina Olivas  : 2000         Chief Complaint:     Chief Complaint   Patient presents with    Anxiety     pt states he weaned off all his medications to \"reboot his body\" states he is no longer having the SE \" dizziness double vision , does state he still has chest pain and minor panic attacks, states he only used xanxa  a few times        History of Present Illness:      HPI    This is a 24year old man presenting for interval follow up for treatment of MDD, panic attacks. He had successfully tapered off his Prozac but was concerned his Trintellix was causing side effects, so he tapered off that as well to \"reboot my body\" in a similar manner to the Prozac. He states he has had to only use alprazolam a \"few times. \" He does state that he has anxiety daily with somatic symptoms, but it is more manageable. He has discussed his health with his mother, who revealed that Prozac did not work well for her condition. She shared that ER Effexor or escitalopram worked well for her and he is inquiring if these may work well for him. He still is reporting negative impact of his anxiety symptoms at work and at home, but it is slightly improved. Past Medical History:     Past Medical History:   Diagnosis Date    IBS (irritable bowel syndrome)     Insomnia     Major depressive disorder     Migraine     Panic attacks 2021      Reviewed all health maintenance requirements and ordered appropriate tests  Health Maintenance Due   Topic Date Due    Hepatitis C screen  Never done    COVID-19 Vaccine (1) Never done    HPV vaccine (1 - Male 2-dose series) Never done    HIV screen  Never done    Flu vaccine (1) 2021       Past Surgical History:     Past Surgical History:   Procedure Laterality Date    TONSILLECTOMY          Medications:       Prior to Admission medications    Medication Sig Start Date End Date Taking?  Authorizing Provider   naproxen sodium (ANAPROX) 550 MG tablet Take 550 mg by mouth 2 times daily (with meals) Take 2 tables as needed   Yes Historical Provider, MD        Allergies:       Patient has no known allergies. Social History:     Tobacco:    reports that he has never smoked. He has never used smokeless tobacco.  Alcohol:      reports no history of alcohol use. Drug Use:  reports no history of drug use. Family History:     No family history on file. Review of Systems:         Review of Systems   Constitutional: Negative for fever. HENT: Negative for sinus pain, sneezing and sore throat. Respiratory: Negative for cough and wheezing. Cardiovascular: Negative for chest pain. Gastrointestinal: Negative for abdominal pain, diarrhea, nausea and vomiting. Psychiatric/Behavioral: Positive for behavioral problems, decreased concentration, dysphoric mood and sleep disturbance. Negative for suicidal ideas. The patient is nervous/anxious. Physical Exam:     Vitals:  /70 (Site: Left Upper Arm, Position: Sitting, Cuff Size: Large Adult)   Pulse 90   Resp 20   Ht 6' 1\" (1.854 m)   Wt 226 lb 9.6 oz (102.8 kg)   SpO2 99%   BMI 29.90 kg/m²       Physical Exam  Vitals and nursing note reviewed. Constitutional:       General: He is not in acute distress. Appearance: Normal appearance. Musculoskeletal:         General: No swelling or tenderness. Normal range of motion. Skin:     General: Skin is warm and dry. Capillary Refill: Capillary refill takes less than 2 seconds. Neurological:      General: No focal deficit present. Mental Status: He is alert and oriented to person, place, and time. Mental status is at baseline. Cranial Nerves: No cranial nerve deficit. Motor: No weakness. Psychiatric:         Attention and Perception: Attention and perception normal.         Mood and Affect: Mood normal. Mood is not anxious or depressed. Affect is not blunt.          Speech: Speech normal.         Behavior: Behavior normal.         Thought Content: Thought content normal.         Cognition and Memory: Cognition and memory normal.                 Data:     Lab Results   Component Value Date     10/27/2021    K 3.7 10/27/2021     10/27/2021    CO2 28 10/27/2021    BUN 17 10/27/2021    CREATININE 1.22 10/27/2021    GLUCOSE 88 10/27/2021    GLUCOSE 93 11/25/2011    PROT 7.7 10/27/2021    LABALBU 4.5 10/27/2021    BILITOT 0.31 10/27/2021    ALKPHOS 125 10/27/2021    AST 18 10/27/2021    ALT 17 10/27/2021     Lab Results   Component Value Date    WBC 9.5 10/27/2021    RBC 5.01 10/27/2021    RBC 4.52 11/25/2011    HGB 14.4 10/27/2021    HCT 42.1 10/27/2021    MCV 84.1 10/27/2021    MCH 28.7 10/27/2021    MCHC 34.1 10/27/2021    RDW 14.4 10/27/2021     10/27/2021     11/25/2011    MPV NOT REPORTED 10/27/2021     Lab Results   Component Value Date    TSH 0.88 11/04/2021     Lab Results   Component Value Date    LABA1C 5.2 11/04/2021          Assessment & Plan        Diagnosis Orders   1. Current moderate episode of major depressive disorder without prior episode ([facility] [address]     2. Panic attacks         1. He appears to have safely tapered off of Prozac, as well as Trintellix. I do feel his questions about using Effexor XR are reasonable, and willing to try this since it works well for his mother. Again, I would recommend that we commit to 6 months at least therapy. The patient I had a long discussion about starting Effexor. We discussed its mechanism of action, intended goals, adverse effects, as well as common side effects. They were able to verbalize understanding, and repeat plan back to me. He is to start using 37.5 mg p.o. daily, and after 14 days, increase to 75 mg p.o. daily    We will refill Xanax as well.     Follow up in 4 weeks        Completed Refills   Requested Prescriptions     Pending Prescriptions Disp Refills    venlafaxine (EFFEXOR XR) 37.5 MG extended release capsule 30 capsule 0     Sig: Take one capsule by mouth daily for 2 weeks, then take 2 capsules by mouth daily after that    ALPRAZolam (XANAX) 0.5 MG tablet 30 tablet 0     Sig: Take 1 tablet by mouth nightly as needed for Sleep or Anxiety for up to 30 days. No follow-ups on file. No orders of the defined types were placed in this encounter. No orders of the defined types were placed in this encounter. Patient Instructions     SURVEY:    You may be receiving a survey from [employer] regarding your visit today. Please complete the survey to enable us to provide the highest quality of care to you and your family. If you cannot score us a very good on any question, please call the office to discuss how we could of made your experience a very good one. Thank you. Clinical Care Team:     RAMESH Gardner      ClericalTeam:     Eva Vasquez      Electronically signed by Perla Mercer DO on 12/2/2021 at 8:49 AM           Completed Refills   Requested Prescriptions     Pending Prescriptions Disp Refills    venlafaxine (EFFEXOR XR) 37.5 MG extended release capsule 30 capsule 0     Sig: Take one capsule by mouth daily for 2 weeks, then take 2 capsules by mouth daily after that    ALPRAZolam (XANAX) 0.5 MG tablet 30 tablet 0     Sig: Take 1 tablet by mouth nightly as needed for Sleep or Anxiety for up to 30 days. Chance received counseling on the following healthy behaviors: nutrition, exercise and medication adherence  Reviewed prior labs and health maintenance. Continue current medications, diet and exercise. Discussed use, benefit, and side effects of prescribed medications. Barriers to medication compliance addressed. Patient given educational materials - see patient instructions. All patient questions answered. Patient voiced understanding.

## 2021-12-13 ENCOUNTER — OFFICE VISIT (OUTPATIENT)
Dept: FAMILY MEDICINE CLINIC | Age: 21
End: 2021-12-13
Payer: COMMERCIAL

## 2021-12-13 ENCOUNTER — HOSPITAL ENCOUNTER (OUTPATIENT)
Age: 21
Discharge: HOME | End: 2021-12-13
Payer: COMMERCIAL

## 2021-12-13 VITALS
DIASTOLIC BLOOD PRESSURE: 92 MMHG | RESPIRATION RATE: 20 BRPM | TEMPERATURE: 97.8 F | HEART RATE: 82 BPM | BODY MASS INDEX: 30.48 KG/M2 | SYSTOLIC BLOOD PRESSURE: 128 MMHG | WEIGHT: 230 LBS | HEIGHT: 73 IN

## 2021-12-13 DIAGNOSIS — H53.9 VISUAL DISTURBANCE: ICD-10-CM

## 2021-12-13 DIAGNOSIS — R07.2 PRECORDIAL PAIN: Primary | ICD-10-CM

## 2021-12-13 DIAGNOSIS — R07.2 PRECORDIAL PAIN: ICD-10-CM

## 2021-12-13 DIAGNOSIS — G44.89 OTHER HEADACHE SYNDROME: ICD-10-CM

## 2021-12-13 DIAGNOSIS — I16.0 HYPERTENSIVE URGENCY: ICD-10-CM

## 2021-12-13 LAB
ABSOLUTE EOS #: 0.2 K/UL (ref 0–0.4)
ABSOLUTE IMMATURE GRANULOCYTE: ABNORMAL K/UL (ref 0–0.3)
ABSOLUTE LYMPH #: 2.3 K/UL (ref 1–4.8)
ABSOLUTE MONO #: 0.4 K/UL (ref 0–1)
ALBUMIN SERPL-MCNC: 4.2 G/DL (ref 3.5–5.2)
ALBUMIN/GLOBULIN RATIO: ABNORMAL (ref 1–2.5)
ALP BLD-CCNC: 128 U/L (ref 40–129)
ALT SERPL-CCNC: 27 U/L (ref 5–41)
ANION GAP SERPL CALCULATED.3IONS-SCNC: 12 MMOL/L (ref 9–17)
AST SERPL-CCNC: 21 U/L
BASOPHILS # BLD: 0 % (ref 0–2)
BASOPHILS ABSOLUTE: 0 K/UL (ref 0–0.2)
BILIRUB SERPL-MCNC: 0.3 MG/DL (ref 0.3–1.2)
BUN BLDV-MCNC: 12 MG/DL (ref 6–20)
BUN/CREAT BLD: 10 (ref 9–20)
CALCIUM SERPL-MCNC: 9.8 MG/DL (ref 8.6–10.4)
CHLORIDE BLD-SCNC: 101 MMOL/L (ref 98–107)
CO2: 26 MMOL/L (ref 20–31)
CREAT SERPL-MCNC: 1.16 MG/DL (ref 0.7–1.2)
DIFFERENTIAL TYPE: YES
EOSINOPHILS RELATIVE PERCENT: 2 % (ref 0–5)
GFR AFRICAN AMERICAN: >60 ML/MIN
GFR NON-AFRICAN AMERICAN: >60 ML/MIN
GFR SERPL CREATININE-BSD FRML MDRD: ABNORMAL ML/MIN/{1.73_M2}
GFR SERPL CREATININE-BSD FRML MDRD: ABNORMAL ML/MIN/{1.73_M2}
GLUCOSE BLD-MCNC: 103 MG/DL (ref 70–99)
HCT VFR BLD CALC: 40.5 % (ref 41–53)
HEMOGLOBIN: 13.7 G/DL (ref 13.5–17.5)
IMMATURE GRANULOCYTES: ABNORMAL %
LYMPHOCYTES # BLD: 28 % (ref 13–44)
MCH RBC QN AUTO: 28.1 PG (ref 26–34)
MCHC RBC AUTO-ENTMCNC: 33.8 G/DL (ref 31–37)
MCV RBC AUTO: 83.1 FL (ref 80–100)
MONOCYTES # BLD: 5 % (ref 5–9)
NRBC AUTOMATED: ABNORMAL PER 100 WBC
PDW BLD-RTO: 13.9 % (ref 12.1–15.2)
PLATELET # BLD: 335 K/UL (ref 140–450)
PLATELET ESTIMATE: ABNORMAL
PMV BLD AUTO: ABNORMAL FL (ref 6–12)
POTASSIUM SERPL-SCNC: 3.9 MMOL/L (ref 3.7–5.3)
RBC # BLD: 4.87 M/UL (ref 4.5–5.9)
RBC # BLD: ABNORMAL 10*6/UL
SEG NEUTROPHILS: 65 % (ref 39–75)
SEGMENTED NEUTROPHILS ABSOLUTE COUNT: 5.2 K/UL (ref 2.1–6.5)
SODIUM BLD-SCNC: 139 MMOL/L (ref 135–144)
TOTAL PROTEIN: 7.2 G/DL (ref 6.4–8.3)
TROPONIN INTERP: NORMAL
TROPONIN T: NORMAL NG/ML
TROPONIN, HIGH SENSITIVITY: <6 NG/L (ref 0–22)
WBC # BLD: 8.1 K/UL (ref 4.5–13.5)
WBC # BLD: ABNORMAL 10*3/UL

## 2021-12-13 PROCEDURE — 36415 COLL VENOUS BLD VENIPUNCTURE: CPT

## 2021-12-13 PROCEDURE — 99214 OFFICE O/P EST MOD 30 MIN: CPT | Performed by: STUDENT IN AN ORGANIZED HEALTH CARE EDUCATION/TRAINING PROGRAM

## 2021-12-13 PROCEDURE — 84484 ASSAY OF TROPONIN QUANT: CPT

## 2021-12-13 PROCEDURE — 80053 COMPREHEN METABOLIC PANEL: CPT

## 2021-12-13 PROCEDURE — 85025 COMPLETE CBC W/AUTO DIFF WBC: CPT

## 2021-12-13 PROCEDURE — 93005 ELECTROCARDIOGRAM TRACING: CPT

## 2021-12-13 RX ORDER — HYDRALAZINE HYDROCHLORIDE 25 MG/1
25 TABLET, FILM COATED ORAL 3 TIMES DAILY
COMMUNITY
End: 2021-12-13 | Stop reason: CLARIF

## 2021-12-13 RX ORDER — HYDROXYZINE HYDROCHLORIDE 25 MG/1
25 TABLET, FILM COATED ORAL 3 TIMES DAILY PRN
COMMUNITY
End: 2021-12-25

## 2021-12-13 RX ORDER — CLONIDINE HYDROCHLORIDE 0.1 MG/1
0.1 TABLET ORAL 2 TIMES DAILY
Qty: 20 TABLET | Refills: 0 | Status: SHIPPED | OUTPATIENT
Start: 2021-12-13 | End: 2021-12-30

## 2021-12-13 ASSESSMENT — ENCOUNTER SYMPTOMS
SINUS PAIN: 0
NAUSEA: 0
BACK PAIN: 0
SORE THROAT: 0
ABDOMINAL PAIN: 0
DIARRHEA: 0
COUGH: 0
VOMITING: 0
WHEEZING: 0

## 2021-12-13 NOTE — PROGRESS NOTES
HPI Notes    Name: Eric Cardenas  : 2000         Chief Complaint:     Chief Complaint   Patient presents with    Chest Pain     Sharp chest pain on left side of breast state it feels much different     Dizziness    Migraine       History of Present Illness:      HPI    This is a 24year old man presenting for evaluation of chest discomfort. This is described as a sharp discomfort on the left side of his chest, but it \"feels much different\". He specifically reports that this feels different from somatic symptoms he has appreciated during his panic attacks. The discomfort will sometimes radiate down his left arm. He will also have cold sweats, dizziness (specifically describes vertiginous dizziness) and difficulty standing. He has been having blurry vision with visual changes (scintillating scotoma), as well as headaches within the setting of his symptoms. Orthostatic VS were negative, but his diastolic pressure was elevated compared to previous visits. He denies feelings of panic or anxiety in the context of his symptoms. He has been eating and drinking regularly and not fasting. Xanax has not alleviated his symptoms. Past Medical History:     Past Medical History:   Diagnosis Date    IBS (irritable bowel syndrome)     Insomnia     Major depressive disorder     Migraine     Panic attacks 2021      Reviewed all health maintenance requirements and ordered appropriate tests  Health Maintenance Due   Topic Date Due    Hepatitis C screen  Never done    HPV vaccine (1 - Male 2-dose series) Never done    COVID-19 Vaccine (1) Never done    HIV screen  Never done    Flu vaccine (1) 2021       Past Surgical History:     Past Surgical History:   Procedure Laterality Date    TONSILLECTOMY          Medications:       Prior to Admission medications    Medication Sig Start Date End Date Taking?  Authorizing Provider   hydrOXYzine (ATARAX) 25 MG tablet Take 25 mg by mouth 3 times daily as needed for Anxiety   Yes Eliana Anderson APRN - CNP   cloNIDine (CATAPRES) 0.1 MG tablet Take 1 tablet by mouth 2 times daily 12/13/21  Yes Alvarado Raúl, DO   venlafaxine (EFFEXOR XR) 37.5 MG extended release capsule Take one capsule by mouth daily for 2 weeks, then take 2 capsules by mouth daily after that 12/2/21  Yes Christiano Olsen, DO   ALPRAZolam [facility] Kidney) 0.5 MG tablet Take 1 tablet by mouth nightly as needed for Sleep or Anxiety for up to 30 days. 12/2/21 1/1/22 Yes Juni Pena, DO   venlafaxine (EFFEXOR XR) 75 MG extended release capsule Take 1 capsule by mouth daily 12/2/21  Yes Juni Pena DO   naproxen sodium (ANAPROX) 550 MG tablet Take 550 mg by mouth 2 times daily (with meals) Take 2 tables as needed    Historical Provider, MD        Allergies:       Patient has no known allergies. Social History:     Tobacco:    reports that he has never smoked. He has never used smokeless tobacco.  Alcohol:      reports no history of alcohol use. Drug Use:  reports no history of drug use. Family History:     No family history on file. Review of Systems:         Review of Systems   Constitutional: Negative for fever. HENT: Negative for sinus pain, sneezing and sore throat. Eyes: Positive for visual disturbance. Respiratory: Negative for cough and wheezing. Cardiovascular: Positive for chest pain. Gastrointestinal: Negative for abdominal pain, diarrhea, nausea and vomiting. Musculoskeletal: Negative for back pain. Skin: Negative for rash. Neurological: Positive for dizziness and headaches. Hematological: Negative for adenopathy. Psychiatric/Behavioral: Negative for sleep disturbance. Physical Exam:     Vitals:  BP (!) 128/92 (Site: Left Upper Arm, Position: Sitting, Cuff Size: Large Adult)   Pulse 82   Temp 97.8 °F (36.6 °C)   Resp 20   Ht 6' 1\" (1.854 m)   Wt 230 lb (104.3 kg)   BMI 30.34 kg/m²       Physical Exam  Vitals and nursing note reviewed. Constitutional:       General: He is not in acute distress. Appearance: Normal appearance. He is normal weight. Cardiovascular:      Rate and Rhythm: Normal rate and regular rhythm. Pulses: Normal pulses. Heart sounds: Normal heart sounds. No murmur heard. Pulmonary:      Effort: Pulmonary effort is normal. No respiratory distress. Breath sounds: Normal breath sounds. No wheezing or rhonchi. Musculoskeletal:         General: No swelling or tenderness. Normal range of motion. Skin:     General: Skin is warm and dry. Capillary Refill: Capillary refill takes less than 2 seconds. Neurological:      General: No focal deficit present. Mental Status: He is alert and oriented to person, place, and time. Mental status is at baseline. Cranial Nerves: No cranial nerve deficit. Motor: No weakness. Gait: Gait normal.   Psychiatric:         Mood and Affect: Mood normal.         Behavior: Behavior normal.         Thought Content: Thought content normal.                 Data:     Lab Results   Component Value Date     10/27/2021    K 3.7 10/27/2021     10/27/2021    CO2 28 10/27/2021    BUN 17 10/27/2021    CREATININE 1.22 10/27/2021    GLUCOSE 88 10/27/2021    GLUCOSE 93 11/25/2011    PROT 7.7 10/27/2021    LABALBU 4.5 10/27/2021    BILITOT 0.31 10/27/2021    ALKPHOS 125 10/27/2021    AST 18 10/27/2021    ALT 17 10/27/2021     Lab Results   Component Value Date    WBC 9.5 10/27/2021    RBC 5.01 10/27/2021    RBC 4.52 11/25/2011    HGB 14.4 10/27/2021    HCT 42.1 10/27/2021    MCV 84.1 10/27/2021    MCH 28.7 10/27/2021    MCHC 34.1 10/27/2021    RDW 14.4 10/27/2021     10/27/2021     11/25/2011    MPV NOT REPORTED 10/27/2021     Lab Results   Component Value Date    TSH 0.88 11/04/2021     Lab Results   Component Value Date    LABA1C 5.2 11/04/2021          Assessment & Plan        Diagnosis Orders   1.  Precordial pain  EKG 12 lead    Comprehensive Metabolic Panel    Troponin I   2. Visual disturbance  EKG 12 lead    Comprehensive Metabolic Panel    Troponin I   3. Other headache syndrome  EKG 12 lead    Comprehensive Metabolic Panel    Troponin I   4. Hypertensive urgency  EKG 12 lead    Comprehensive Metabolic Panel    Troponin I    cloNIDine (CATAPRES) 0.1 MG tablet       1. Differential diagnosis is broad, encompassing first and foremost with symptomatic hypertension (hypertensive urgency). This is my primary entity with my differential as it would explain the most of his symptoms, and additionally he has observable diastolic hypertension, which is new from previous studies. Additional entities do include hypoglycemia, infectious cause such as encephalitis, or less likely acute coronary syndrome. I believe that evaluating with a CMP, CBC, troponin as well as twelve-lead ECG would be the most prudent steps initially. I would also recommend starting clonidine 0.1 mg twice daily for the next several days. The patient I had a long discussion about starting clonidine. We discussed its mechanism of action, intended goals, adverse effects, as well as common side effects. They were able to verbalize understanding, and repeat plan back to me. Follow-up in 3 days for reevaluation        Completed Refills   Requested Prescriptions     Signed Prescriptions Disp Refills    cloNIDine (CATAPRES) 0.1 MG tablet 20 tablet 0     Sig: Take 1 tablet by mouth 2 times daily     Return in about 3 days (around 12/16/2021) for symptomatic hypertension.   Orders Placed This Encounter   Medications    cloNIDine (CATAPRES) 0.1 MG tablet     Sig: Take 1 tablet by mouth 2 times daily     Dispense:  20 tablet     Refill:  0     Orders Placed This Encounter   Procedures    Comprehensive Metabolic Panel     Standing Status:   Future     Standing Expiration Date:   12/13/2022    Troponin I     Standing Status:   Future     Standing Expiration Date:   12/13/2022    EKG 12 lead     Standing Status:   Future     Standing Expiration Date:   12/13/2022     Order Specific Question:   Reason for Exam?     Answer:   Chest pain         Patient Instructions     SURVEY:    You may be receiving a survey from [employer] regarding your visit today. Please complete the survey to enable us to provide the highest quality of care to you and your family. If you cannot score us a very good on any question, please call the office to discuss how we could of made your experience a very good one. Thank you. Clinical Care Team:     Dr. Jignesh Hirsch, URMILA      ClericalTeam:     Tobi Perez      Electronically signed by Amanda Moran DO on 12/13/2021 at 8:30 AM           Completed Refills   Requested Prescriptions     Signed Prescriptions Disp Refills    cloNIDine (CATAPRES) 0.1 MG tablet 20 tablet 0     Sig: Take 1 tablet by mouth 2 times daily         Chance received counseling on the following healthy behaviors: nutrition, exercise and medication adherence  Reviewed prior labs and health maintenance. Continue current medications, diet and exercise. Discussed use, benefit, and side effects of prescribed medications. Barriers to medication compliance addressed. Patient given educational materials - see patient instructions. All patient questions answered. Patient voiced understanding.

## 2021-12-13 NOTE — PATIENT INSTRUCTIONS
SURVEY:    You may be receiving a survey from [employer] regarding your visit today. Please complete the survey to enable us to provide the highest quality of care to you and your family. If you cannot score us a very good on any question, please call the office to discuss how we could of made your experience a very good one. Thank you.       Clinical Care Team:     Dr. Chilo Pryor, URMILA      ClericalTeam:     [id number] [facility]

## 2021-12-14 LAB
EKG ATRIAL RATE: 64 BPM
EKG P AXIS: -2 DEGREES
EKG P-R INTERVAL: 118 MS
EKG Q-T INTERVAL: 392 MS
EKG QRS DURATION: 88 MS
EKG QTC CALCULATION (BAZETT): 404 MS
EKG R AXIS: 105 DEGREES
EKG T AXIS: 112 DEGREES
EKG VENTRICULAR RATE: 64 BPM

## 2021-12-16 ENCOUNTER — OFFICE VISIT (OUTPATIENT)
Dept: FAMILY MEDICINE CLINIC | Age: 21
End: 2021-12-16
Payer: COMMERCIAL

## 2021-12-16 VITALS
RESPIRATION RATE: 20 BRPM | HEART RATE: 80 BPM | DIASTOLIC BLOOD PRESSURE: 86 MMHG | WEIGHT: 229.6 LBS | HEIGHT: 73 IN | BODY MASS INDEX: 30.43 KG/M2 | SYSTOLIC BLOOD PRESSURE: 112 MMHG

## 2021-12-16 DIAGNOSIS — I16.0 HYPERTENSIVE URGENCY: Primary | ICD-10-CM

## 2021-12-16 PROCEDURE — 99213 OFFICE O/P EST LOW 20 MIN: CPT | Performed by: STUDENT IN AN ORGANIZED HEALTH CARE EDUCATION/TRAINING PROGRAM

## 2021-12-16 ASSESSMENT — ENCOUNTER SYMPTOMS
BACK PAIN: 0
SINUS PAIN: 0
COUGH: 0
ABDOMINAL PAIN: 0
VOMITING: 0
NAUSEA: 0
WHEEZING: 0
SORE THROAT: 0
DIARRHEA: 0

## 2021-12-16 NOTE — PROGRESS NOTES
HPI Notes    Name: Ari Ch  : 2000         Chief Complaint:     Chief Complaint   Patient presents with    Hypertension     states yesterday was a good day feels this is related to personal stress he is having wiht his mother in the hospital        History of Present Illness:        HPI    This is a 77-year-old young man presenting to follow-up for symptoms of hypertensive urgency. He I had seen him at his last outpatient visit for complaint of headache, precordial pain, as well as visual disturbances. He did have elevated diastolic pressures. Orthostatics were negative at previous OV, as well as today. I had started him on clonidine, at a low dose for gradual blood pressure modulation. He has been taking clonidine, and states that \"yesterday was a good day\", and he feels that this has been related to personal stress since his mother has been very ill and has been hospitalized. He reports that \"I took yesterday for myself and did a lot of calming activities I enjoy. I wasn't sleeping/eating, and wanted to do a reset and do what's best for me yesterday. \"    Also of note, initial cardiac work-up appears to be negative. He did have a right axis deviation on his EKG, which is new from previous EKG, and nonspecific T wave changes in the lateral leads but negative troponin, and negative BMP for electrolyte derangements.     Past Medical History:     Past Medical History:   Diagnosis Date    IBS (irritable bowel syndrome)     Insomnia     Major depressive disorder     Migraine     Panic attacks 2021      Reviewed all health maintenance requirements and ordered appropriate tests  Health Maintenance Due   Topic Date Due    Hepatitis C screen  Never done    HPV vaccine (1 - Male 2-dose series) Never done    COVID-19 Vaccine (1) Never done    HIV screen  Never done    Flu vaccine (1) 2021       Past Surgical History:     Past Surgical History:   Procedure Laterality Date    TONSILLECTOMY          Medications:       Prior to Admission medications    Medication Sig Start Date End Date Taking? Authorizing Provider   hydrOXYzine (ATARAX) 25 MG tablet Take 25 mg by mouth 3 times daily as needed for Anxiety   Yes ABBEY Sifuentes CNP   cloNIDine (CATAPRES) 0.1 MG tablet Take 1 tablet by mouth 2 times daily 12/13/21  Yes Angeles Sexton DO   naproxen sodium (ANAPROX) 550 MG tablet Take 550 mg by mouth 2 times daily (with meals) Take 2 tables as needed   Yes Historical Provider, MD   venlafaxine (EFFEXOR XR) 37.5 MG extended release capsule Take one capsule by mouth daily for 2 weeks, then take 2 capsules by mouth daily after that 12/2/21  Yes Angeles Sexton DO   ALPRAZolam [place]) 0.5 MG tablet Take 1 tablet by mouth nightly as needed for Sleep or Anxiety for up to 30 days. 12/2/21 1/1/22 Yes Angeles Sexton DO   venlafaxine (EFFEXOR XR) 75 MG extended release capsule Take 1 capsule by mouth daily 12/2/21  Yes Angeles Sexton DO        Allergies:       Patient has no known allergies. Social History:     Tobacco:    reports that he has never smoked. He has never used smokeless tobacco.  Alcohol:      reports no history of alcohol use. Drug Use:  reports no history of drug use. Family History:     No family history on file. Review of Systems:         Review of Systems   Constitutional: Negative for fever. HENT: Negative for sinus pain, sneezing and sore throat. Respiratory: Negative for cough and wheezing. Cardiovascular: Negative for chest pain. Gastrointestinal: Negative for abdominal pain, diarrhea, nausea and vomiting. Musculoskeletal: Negative for back pain. Skin: Negative for rash. Hematological: Negative for adenopathy. Psychiatric/Behavioral: Negative for sleep disturbance.            Physical Exam:     Vitals:  /86 (Site: Left Upper Arm, Position: Standing, Cuff Size: Large Adult)   Pulse 80   Resp 20   Ht 6' 1\" (1.854 m)   Wt 229 understanding.

## 2021-12-25 ENCOUNTER — HOSPITAL ENCOUNTER (EMERGENCY)
Age: 21
Discharge: HOME | End: 2021-12-25
Attending: FAMILY MEDICINE
Payer: COMMERCIAL

## 2021-12-25 ENCOUNTER — APPOINTMENT (OUTPATIENT)
Dept: GENERAL RADIOLOGY | Age: 21
End: 2021-12-25
Payer: COMMERCIAL

## 2021-12-25 VITALS
WEIGHT: 230.6 LBS | DIASTOLIC BLOOD PRESSURE: 98 MMHG | SYSTOLIC BLOOD PRESSURE: 143 MMHG | OXYGEN SATURATION: 97 % | TEMPERATURE: 97.8 F | HEART RATE: 84 BPM | BODY MASS INDEX: 30.42 KG/M2 | RESPIRATION RATE: 13 BRPM

## 2021-12-25 DIAGNOSIS — R07.9 CHEST PAIN, UNSPECIFIED TYPE: Primary | ICD-10-CM

## 2021-12-25 LAB
ABSOLUTE EOS #: 0.1 K/UL (ref 0–0.4)
ABSOLUTE IMMATURE GRANULOCYTE: NORMAL K/UL (ref 0–0.3)
ABSOLUTE LYMPH #: 3.7 K/UL (ref 1–4.8)
ABSOLUTE MONO #: 0.7 K/UL (ref 0–1)
ANION GAP SERPL CALCULATED.3IONS-SCNC: 13 MMOL/L (ref 9–17)
BASOPHILS # BLD: 1 % (ref 0–2)
BASOPHILS ABSOLUTE: 0.1 K/UL (ref 0–0.2)
BUN BLDV-MCNC: 16 MG/DL (ref 6–20)
BUN/CREAT BLD: 13 (ref 9–20)
CALCIUM SERPL-MCNC: 9.8 MG/DL (ref 8.6–10.4)
CHLORIDE BLD-SCNC: 101 MMOL/L (ref 98–107)
CO2: 25 MMOL/L (ref 20–31)
CREAT SERPL-MCNC: 1.28 MG/DL (ref 0.7–1.2)
D-DIMER QUANTITATIVE: <0.27 MG/L FEU (ref 0–0.59)
DIFFERENTIAL TYPE: YES
EOSINOPHILS RELATIVE PERCENT: 1 % (ref 0–5)
GFR AFRICAN AMERICAN: >60 ML/MIN
GFR NON-AFRICAN AMERICAN: >60 ML/MIN
GFR SERPL CREATININE-BSD FRML MDRD: ABNORMAL ML/MIN/{1.73_M2}
GFR SERPL CREATININE-BSD FRML MDRD: ABNORMAL ML/MIN/{1.73_M2}
GLUCOSE BLD-MCNC: 97 MG/DL (ref 70–99)
HCT VFR BLD CALC: 42.7 % (ref 41–53)
HEMOGLOBIN: 14.3 G/DL (ref 13.5–17.5)
IMMATURE GRANULOCYTES: NORMAL %
LYMPHOCYTES # BLD: 34 % (ref 13–44)
MCH RBC QN AUTO: 28 PG (ref 26–34)
MCHC RBC AUTO-ENTMCNC: 33.5 G/DL (ref 31–37)
MCV RBC AUTO: 83.6 FL (ref 80–100)
MONOCYTES # BLD: 7 % (ref 5–9)
NRBC AUTOMATED: NORMAL PER 100 WBC
PDW BLD-RTO: 13.7 % (ref 12.1–15.2)
PLATELET # BLD: 401 K/UL (ref 140–450)
PLATELET ESTIMATE: NORMAL
PMV BLD AUTO: NORMAL FL (ref 6–12)
POTASSIUM SERPL-SCNC: 3.7 MMOL/L (ref 3.7–5.3)
RBC # BLD: 5.11 M/UL (ref 4.5–5.9)
RBC # BLD: NORMAL 10*6/UL
SEG NEUTROPHILS: 57 % (ref 39–75)
SEGMENTED NEUTROPHILS ABSOLUTE COUNT: 6.5 K/UL (ref 2.1–6.5)
SODIUM BLD-SCNC: 139 MMOL/L (ref 135–144)
TROPONIN INTERP: NORMAL
TROPONIN T: NORMAL NG/ML
TROPONIN, HIGH SENSITIVITY: <6 NG/L (ref 0–22)
TSH SERPL DL<=0.05 MIU/L-ACNC: 2.47 MIU/L (ref 0.3–5)
WBC # BLD: 11.2 K/UL (ref 4.5–13.5)
WBC # BLD: NORMAL 10*3/UL

## 2021-12-25 PROCEDURE — 99284 EMERGENCY DEPT VISIT MOD MDM: CPT

## 2021-12-25 PROCEDURE — 84443 ASSAY THYROID STIM HORMONE: CPT

## 2021-12-25 PROCEDURE — 85025 COMPLETE CBC W/AUTO DIFF WBC: CPT

## 2021-12-25 PROCEDURE — 84484 ASSAY OF TROPONIN QUANT: CPT

## 2021-12-25 PROCEDURE — 85379 FIBRIN DEGRADATION QUANT: CPT

## 2021-12-25 PROCEDURE — 6370000000 HC RX 637 (ALT 250 FOR IP): Performed by: FAMILY MEDICINE

## 2021-12-25 PROCEDURE — 71045 X-RAY EXAM CHEST 1 VIEW: CPT

## 2021-12-25 PROCEDURE — 80048 BASIC METABOLIC PNL TOTAL CA: CPT

## 2021-12-25 PROCEDURE — 93005 ELECTROCARDIOGRAM TRACING: CPT | Performed by: FAMILY MEDICINE

## 2021-12-25 RX ORDER — ASPIRIN 81 MG/1
324 TABLET, CHEWABLE ORAL ONCE
Status: COMPLETED | OUTPATIENT
Start: 2021-12-25 | End: 2021-12-25

## 2021-12-25 RX ADMIN — Medication: at 04:35

## 2021-12-25 RX ADMIN — ASPIRIN 81 MG CHEWABLE TABLET 324 MG: 81 TABLET CHEWABLE at 03:49

## 2021-12-25 ASSESSMENT — HEART SCORE: ECG: 0

## 2021-12-25 ASSESSMENT — PAIN DESCRIPTION - ORIENTATION: ORIENTATION: RIGHT;LEFT;MID

## 2021-12-25 ASSESSMENT — ENCOUNTER SYMPTOMS
SHORTNESS OF BREATH: 1
COUGH: 1

## 2021-12-25 ASSESSMENT — PAIN DESCRIPTION - LOCATION: LOCATION: CHEST

## 2021-12-25 ASSESSMENT — PAIN SCALES - GENERAL: PAINLEVEL_OUTOF10: 0

## 2021-12-25 ASSESSMENT — PAIN DESCRIPTION - PAIN TYPE: TYPE: ACUTE PAIN

## 2021-12-25 ASSESSMENT — PAIN DESCRIPTION - DESCRIPTORS: DESCRIPTORS: SHARP;PRESSURE

## 2021-12-25 NOTE — ED PROVIDER NOTES
[facility]  eMERGENCY dEPARTMENT eNCOUnter          [address] [facility]       Chief Complaint   Patient presents with    Cough     patient states coughing for a few days and is now having sharp pains on both sides of chest. Noticed fever yesterday. Nurses Notes reviewed and I agree except as noted in the HPI. HISTORY OF PRESENT ILLNESS    Sabas Delgado is a 24 y.o. male who presents to the emergency room via private vehicle with another, patient complaining of chest tightness shortness of breath and fever, and her mended to cough and fever. Patient has been cough for the past few days now having sharp pains in both of his chest indicating the anterior lateral aspect of the chest. Patient noted a fever yesterday. Patient is not a smoker, no history of asthma or COPD. REVIEW OF SYSTEMS     Review of Systems   Constitutional: Positive for fever. Respiratory: Positive for cough and shortness of breath. Cardiovascular: Negative for leg swelling. All other systems reviewed and are negative. PAST MEDICAL HISTORY    has a past medical history of IBS (irritable bowel syndrome), Insomnia, Major depressive disorder, Migraine, and Panic attacks. SURGICAL HISTORY      has a past surgical history that includes Tonsillectomy. CURRENT MEDICATIONS       Previous Medications    ALPRAZOLAM (XANAX) 0.5 MG TABLET    Take 1 tablet by mouth nightly as needed for Sleep or Anxiety for up to 30 days.     CLONIDINE (CATAPRES) 0.1 MG TABLET    Take 1 tablet by mouth 2 times daily    NAPROXEN SODIUM (ANAPROX) 550 MG TABLET    Take 550 mg by mouth 2 times daily (with meals) Take 2 tables as needed    VENLAFAXINE (EFFEXOR XR) 37.5 MG EXTENDED RELEASE CAPSULE    Take one capsule by mouth daily for 2 weeks, then take 2 capsules by mouth daily after that    VENLAFAXINE (EFFEXOR XR) 75 MG EXTENDED RELEASE CAPSULE    Take 1 capsule by mouth daily       ALLERGIES     has No Known Allergies. FAMILY HISTORY     has no family status information on file. family history is not on file. SOCIAL HISTORY      reports that he has never smoked. He has never used smokeless tobacco. He reports that he does not drink alcohol and does not use drugs. PHYSICAL EXAM     INITIAL VITALS:  weight is 230 lb 9.6 oz (104.6 kg). His oral temperature is 97.8 °F (36.6 °C). His blood pressure is 143/98 (abnormal) and his pulse is 84. His respiration is 13 and oxygen saturation is 97%. Physical Exam   Constitutional: Patient is oriented to person, place, and time. Patient appears well-developed and well-nourished. Patient is active and cooperative. HENT:   Head: Normocephalic and atraumatic. Head is without contusion. Right Ear: Hearing and external ear normal. No drainage. Left Ear: Hearing and external ear normal. No drainage. Nose: Nose normal. No nasal deformity. No epistaxis. Mouth/Throat: Mucous membranes are not dry. Eyes: EOMI. Conjunctivae, sclera, and lids are normal. Right eye exhibits no discharge. Left eye exhibits no discharge. Neck: Full passive range of motion without pain and phonation normal. Negative JVD. Cardiovascular:   Normal rate, regular rhythm and intact distal pulses. No edema. Negative Miguel's sign bilaterally. Pulses: Radial pulse is 2+   Pulmonary/Chest: Effort normal.   No tachypnea and no bradypnea. No wheezes, rhonchi, rales, or stridor. Abdominal: Soft. Patient without distension or tenderness  Musculoskeletal:   Negative acute trauma or deformity,  apparent full range of motion and normal strength all extremities appropriate to age. Neurological: Patient is alert and oriented to person, place, and time. patient displays no tremor. Patient displays no seizure activity. Skin: Skin is warm and dry. Patient is not diaphoretic. Psychiatric: Patient has a normal mood and affect.  Patient speech is normal and behavior is normal. Cognition and memory are normal.      DIFFERENTIAL DIAGNOSIS:   ACS, AA, PE, GERD/gastritis, PTX, anxiety, msk    DIAGNOSTIC RESULTS     EKG: All EKG's are interpreted by the Emergency Department Physician who either signs or Co-signs this chart in the absence of a cardiologist.  EKG    The patient had an EKG which is interpreted by me in the absence of a Cardiologist.   [] Without comparison to previous. [x] With comparison to a previous EKG Dated 12/13/2021    EKG @ 0343 hrs -sinus rhythm rate 93 normal axis normal normals, noted T WI lead III        RADIOLOGY: non-plain film images(s) such as CT, Ultrasound and MRI are read by the radiologist.  XR CHEST PORTABLE   Final Result      Mild left perihilar mid and lower lung streaky pulmonary opacities reflect    atelectasis or mild infectious infiltrates. LABS:   Labs Reviewed   BASIC METABOLIC PANEL W/ REFLEX TO MG FOR LOW K - Abnormal; Notable for the following components:       Result Value    CREATININE 1.28 (*)     All other components within normal limits   CBC WITH AUTO DIFFERENTIAL   TROPONIN   D-DIMER, QUANTITATIVE   TSH WITH REFLEX       EMERGENCY DEPARTMENT COURSE:   Vitals:    Vitals:    12/25/21 0336 12/25/21 0400 12/25/21 0415 12/25/21 0430   BP: (!) 146/96 (!) 142/98 (!) 148/99 (!) 143/98   Pulse: 92 92 94 84   Resp: 16 19 16 13   Temp: 97.8 °F (36.6 °C)      TempSrc: Oral      SpO2: 99% 98% 98% 97%   Weight: 230 lb 9.6 oz (104.6 kg)        Patient history and physical exam taken at bedside, discussed patient's symptoms and exam findings as well as initial plan of workup to include EKG, chest x-ray, blood work with saline lock insertion, and chewable aspirin. Patient placed on cardiac monitor and continuous pulse oximetry resting semi-Fowlers in bed. EKG as above. Chest xray as above.     Initial lab workup reviewed, noting hs-Abhijit of <6, DDimer <0.27, TSH 2.47    When the lab work-up reviewed    HEART Score = 1    Discussed with patient and patient have another overall work-up including EKG imaging labs, we did discuss all the radiology is pain possible left perihilar mid and lower lung streaky prominence differential atelectasis versus possible early infiltrate, based on patient's labs and otherwise work-up, and that feels like to be infiltrate. We discussed patient's anxiety, possible causes and patient's reported pain including esophageal spasms versus other cause chest pain, based on patient age less likely to be cardiac in nature though cannot be exclusively ruled out. We will go ahead and discharge patient home, close outpatient follow-up, return to ER if any symptoms change worse other concerns acknowledged    FINAL IMPRESSION      1. Chest pain, unspecified type          DISPOSITION/PLAN   D/c    PATIENT REFERRED TO:  Roc Fatima DO  [address] [place]    Call       [facility]  [address] [place] [id number]  [phone]    As needed, If symptoms worsen      DISCHARGE MEDICATIONS:  New Prescriptions    No medications on file           Summation      Patient Course:  D/c    ED Medications administered this visit:    Medications   aspirin chewable tablet 324 mg (324 mg Oral Given 12/25/21 0349)   aluminum & magnesium hydroxide-simethicone (MAALOX) 30 mL, lidocaine viscous hcl (XYLOCAINE) 5 mL (GI COCKTAIL) ( Oral Given 12/25/21 0435)       New Prescriptions from this visit:    New Prescriptions    No medications on file       Follow-up:  Roc Fatima DO  [address] [place]    Call       [facility]  [address] [place] [address] [id number] [phone]    As needed, If symptoms worsen        Final Impression:   1.  Chest pain, unspecified type               (Please note that portions of this note were completed with a voice recognition program.  Efforts were made to edit the dictations but occasionally words are mis-transcribed.)    MD Valdemar Heredia MD  12/25/21 MD Chelo  12/25/21 9199

## 2021-12-26 LAB
EKG ATRIAL RATE: 93 BPM
EKG P AXIS: 40 DEGREES
EKG P-R INTERVAL: 138 MS
EKG Q-T INTERVAL: 356 MS
EKG QRS DURATION: 70 MS
EKG QTC CALCULATION (BAZETT): 442 MS
EKG R AXIS: 47 DEGREES
EKG T AXIS: 42 DEGREES
EKG VENTRICULAR RATE: 93 BPM

## 2021-12-26 PROCEDURE — 93010 ELECTROCARDIOGRAM REPORT: CPT | Performed by: INTERNAL MEDICINE

## 2021-12-30 ENCOUNTER — OFFICE VISIT (OUTPATIENT)
Dept: FAMILY MEDICINE CLINIC | Age: 21
End: 2021-12-30
Payer: COMMERCIAL

## 2021-12-30 VITALS
WEIGHT: 231 LBS | OXYGEN SATURATION: 100 % | DIASTOLIC BLOOD PRESSURE: 70 MMHG | RESPIRATION RATE: 20 BRPM | SYSTOLIC BLOOD PRESSURE: 124 MMHG | HEART RATE: 78 BPM | BODY MASS INDEX: 30.62 KG/M2 | HEIGHT: 73 IN

## 2021-12-30 DIAGNOSIS — F51.02 ADJUSTMENT INSOMNIA: ICD-10-CM

## 2021-12-30 DIAGNOSIS — R07.2 PRECORDIAL PAIN: Primary | ICD-10-CM

## 2021-12-30 PROCEDURE — 99214 OFFICE O/P EST MOD 30 MIN: CPT | Performed by: STUDENT IN AN ORGANIZED HEALTH CARE EDUCATION/TRAINING PROGRAM

## 2021-12-30 RX ORDER — HYDROXYZINE HYDROCHLORIDE 25 MG/1
25 TABLET, FILM COATED ORAL 3 TIMES DAILY PRN
COMMUNITY
End: 2022-04-11

## 2021-12-30 ASSESSMENT — ENCOUNTER SYMPTOMS
BACK PAIN: 0
COUGH: 0
VOMITING: 0
ABDOMINAL PAIN: 0
SINUS PAIN: 0
NAUSEA: 0
WHEEZING: 0
DIARRHEA: 0
SORE THROAT: 0

## 2021-12-30 NOTE — PROGRESS NOTES
HPI Notes    Name: Radha Real  : 2000         Chief Complaint:     Chief Complaint   Patient presents with    Anxiety       History of Present Illness:      HPI    This is a 66-year-old man with medical history significant for adjustment insomnia, migraines, major depression and panic attacks presenting for interval follow-up for the same conditions. Review of his chart did not reveal that he had been seen in the emergency department on 2021. I was able to review records and documentation from that encounter. It appears that he had been complaining of chest pain, and initial cardiac work-up was once again noncontributory and not concerning for ACS. His chest pain has resolved and at this point he is asymptomatic. He states that his mood is greatly improved since he is increase his Effexor to 75 mg/day. He has established with Shelby Joseph CNP as well, but is doing PRN f/u with her at her direction. He otherwise has no major concerns today. He does report that he tried using valerian root extract for his insomnia, but did not note much improvement. He reports that he has difficulty initiating and maintaining sleep, as he frequently wakes up if he notices a palpitation or any other somatic symptoms. He states that it is difficult for him to fall back asleep after this happened several times. He has been trying to use hydroxyzine nightly as a sleep aid, but is only been using 25 mg.     Past Medical History:     Past Medical History:   Diagnosis Date    IBS (irritable bowel syndrome)     Insomnia     Major depressive disorder     Migraine     Panic attacks 2021      Reviewed all health maintenance requirements and ordered appropriate tests  Health Maintenance Due   Topic Date Due    Hepatitis C screen  Never done    COVID-19 Vaccine (1) Never done    HPV vaccine (1 - Male 2-dose series) Never done    HIV screen  Never done    Flu vaccine (1) 2021 Physical Exam:     Vitals:  /70 (Site: Left Upper Arm, Position: Sitting, Cuff Size: Large Adult)   Pulse 78   Resp 20   Ht 6' 1\" (1.854 m)   Wt 231 lb (104.8 kg)   SpO2 100%   BMI 30.48 kg/m²       Physical Exam  Vitals and nursing note reviewed. Constitutional:       General: He is not in acute distress. Appearance: Normal appearance. He is normal weight. Musculoskeletal:         General: No swelling or tenderness. Normal range of motion. Skin:     General: Skin is warm and dry. Capillary Refill: Capillary refill takes less than 2 seconds. Neurological:      General: No focal deficit present. Mental Status: He is alert and oriented to person, place, and time. Mental status is at baseline. Psychiatric:         Attention and Perception: Attention normal.         Mood and Affect: Mood and affect normal.         Speech: Speech normal.         Behavior: Behavior normal. Behavior is cooperative. Thought Content:  Thought content normal.         Cognition and Memory: Cognition and memory normal.                 Data:     Lab Results   Component Value Date     12/25/2021    K 3.7 12/25/2021     12/25/2021    CO2 25 12/25/2021    BUN 16 12/25/2021    CREATININE 1.28 12/25/2021    GLUCOSE 97 12/25/2021    GLUCOSE 93 11/25/2011    PROT 7.2 12/13/2021    LABALBU 4.2 12/13/2021    BILITOT 0.30 12/13/2021    ALKPHOS 128 12/13/2021    AST 21 12/13/2021    ALT 27 12/13/2021     Lab Results   Component Value Date    WBC 11.2 12/25/2021    RBC 5.11 12/25/2021    RBC 4.52 11/25/2011    HGB 14.3 12/25/2021    HCT 42.7 12/25/2021    MCV 83.6 12/25/2021    MCH 28.0 12/25/2021    MCHC 33.5 12/25/2021    RDW 13.7 12/25/2021     12/25/2021     11/25/2011    MPV NOT REPORTED 12/25/2021     Lab Results   Component Value Date    TSH 2.47 12/25/2021     Lab Results   Component Value Date    LABA1C 5.2 11/04/2021          Assessment & Plan        Diagnosis Orders 1. Precordial pain     2. Adjustment insomnia         1. Chest pain resolved at this point, we have scheduled a outpatient exercise stress test for Wed 01/05/2022 at 0730 hours. 2.  I recommend he increase his hydroxyzine to 50 mg to 100 mg nightly for this problem. He is to contact me in 2 weeks, if this does not appreciably improve, may consider a 2-week treatment with a sedative-hypnotic. Follow up in 6 months for well visit. Completed Refills   Requested Prescriptions      No prescriptions requested or ordered in this encounter     Return in about 6 months (around 6/30/2022) for Well Visit. No orders of the defined types were placed in this encounter. No orders of the defined types were placed in this encounter. Patient Instructions   Chance,    Please use 50 to 100 mg of hydroxyzine 30 to 60 minutes before sleep as a mild sleep aid. Send me a message on my chart in 2 weeks to let me know how you are doing. If this is not improving you are sleeping, we can consider using a 2-week treatment of a sedativehypnotic such as Ambien or Belsomra. We have scheduled your exercise stress test,    Come back in 6 months for your check up. Dr. Vinicius Bruno:    Bernardanicole Montiel may be receiving a survey from [employer] regarding your visit today. Please complete the survey to enable us to provide the highest quality of care to you and your family. If you cannot score us a very good on any question, please call the office to discuss how we could of made your experience a very good one. Thank you.       Clinical Care Team:     Dr. Kevin Ding, URMILA      ClericalTeam:     Marcos Rao      Electronically signed by Jaylin Queen DO on 12/30/2021 at 8:36 AM       Completed Refills   Requested Prescriptions      No prescriptions requested or ordered in this encounter         Chance received counseling on the following healthy behaviors: nutrition, exercise and medication adherence  Reviewed prior labs and health maintenance. Continue current medications, diet and exercise. Discussed use, benefit, and side effects of prescribed medications. Barriers to medication compliance addressed. Patient given educational materials - see patient instructions. All patient questions answered. Patient voiced understanding.

## 2022-01-05 ENCOUNTER — HOSPITAL ENCOUNTER (OUTPATIENT)
Dept: NON INVASIVE DIAGNOSTICS | Age: 22
Discharge: HOME | End: 2022-01-05
Payer: COMMERCIAL

## 2022-01-05 DIAGNOSIS — R94.39 ABNORMAL STRESS ECG WITH TREADMILL: Primary | ICD-10-CM

## 2022-01-05 DIAGNOSIS — R07.9 CHEST PAIN, UNSPECIFIED TYPE: ICD-10-CM

## 2022-01-05 PROCEDURE — 93017 CV STRESS TEST TRACING ONLY: CPT

## 2022-01-06 NOTE — PROCEDURES
[place] [address]                              CARDIAC STRESS TEST    PATIENT NAME: Ranjit CORONA                     :        2000  MED REC NO:   [id number]                              ROOM:  ACCOUNT NO:   [de-identified]                           ADMIT DATE: 2022  PROVIDER:     Tino Arguelles      DATE OF STUDY:  2022    NAME OF THE TEST:  Treadmill stress test.    INDICATION:  Chest pain. He exercised for 7 minutes on accelerated Milo protocol, achieving 11.5  METs. His peak heart rate was 193, which is 96% of maximum predicted  heart rate. He had mild chest pain. He did, however, have ST depression in leads  II, III and aVF and V5 and V6 of 2 mm. He returned to baseline, which  is a normal EKG in recovery. This was an abnormal stress test with ST depression inferolaterally by  EKG criteria. Although he was asymptomatic with no chest pain, this was  an abnormal stress test.  In talking to him, he has a very strong  history of coronary artery disease with his mother having a myocardial  infarction at age 40. Because of his EKG changes during exercise, we will do a treadmill  Myoview stress test for better evaluation for possible occult coronary  artery disease. Mau Mosquera    D: 2022 5:39:47       T: 2022 7:31:02     [email or web address]  Job#: [id number]     Doc#: [id number]    CC:  Barbra Zhang.  Sohan Kendrick DO

## 2022-01-11 ENCOUNTER — HOSPITAL ENCOUNTER (OUTPATIENT)
Dept: NUCLEAR MEDICINE | Age: 22
Discharge: HOME | End: 2022-01-13
Payer: COMMERCIAL

## 2022-01-11 ENCOUNTER — HOSPITAL ENCOUNTER (OUTPATIENT)
Dept: NON INVASIVE DIAGNOSTICS | Age: 22
Discharge: HOME | End: 2022-01-11
Payer: COMMERCIAL

## 2022-01-11 DIAGNOSIS — R94.39 ABNORMAL STRESS ECG WITH TREADMILL: ICD-10-CM

## 2022-01-11 PROCEDURE — A9500 TC99M SESTAMIBI: HCPCS | Performed by: INTERNAL MEDICINE

## 2022-01-11 PROCEDURE — 3430000000 HC RX DIAGNOSTIC RADIOPHARMACEUTICAL: Performed by: INTERNAL MEDICINE

## 2022-01-11 PROCEDURE — 78452 HT MUSCLE IMAGE SPECT MULT: CPT

## 2022-01-11 PROCEDURE — 93017 CV STRESS TEST TRACING ONLY: CPT

## 2022-01-11 RX ADMIN — TETRAKIS(2-METHOXYISOBUTYLISOCYANIDE)COPPER(I) TETRAFLUOROBORATE 10 MILLICURIE: 1 INJECTION, POWDER, LYOPHILIZED, FOR SOLUTION INTRAVENOUS at 08:50

## 2022-01-11 RX ADMIN — TETRAKIS(2-METHOXYISOBUTYLISOCYANIDE)COPPER(I) TETRAFLUOROBORATE 30 MILLICURIE: 1 INJECTION, POWDER, LYOPHILIZED, FOR SOLUTION INTRAVENOUS at 10:10

## 2022-01-12 NOTE — PROCEDURES
[place] [address]                              CARDIAC STRESS TEST    PATIENT NAME: Chata Troncoso                     :        2000  MED REC NO:   [id number]                              ROOM:  ACCOUNT NO:   [de-identified]                           ADMIT DATE: 2022  PROVIDER:     Zacarias Pantoja      DATE OF STUDY:  2022    TREADMILL MYOVIEW STRESS TEST    INDICATION:  _____. He exercised 7 minutes on accelerated Milo protocol achieving 11.8  METs. His peak heart rate was 193, which is 96% of maximum predicted  heart rate. He had no chest pain. He did have ST depression  inferolaterally. This is an abnormal treadmill stress test with  inferolateral wall ST depression although was asymptomatic with no chest  pain. Myoview to follow.         Sherri Waldrop    D: 2022 4:24:22       T: 2022 4:26:37     [email or web address]  Job#: [id number]     Doc#: [id number]    CC:

## 2022-01-12 NOTE — PROCEDURES
[place] [address]                              CARDIAC STRESS TEST    PATIENT NAME: Sunny Bentley                     :        2000  MED REC NO:   [id number]                              ROOM:  ACCOUNT NO:   [de-identified]                           ADMIT DATE: 2022  PROVIDER:     Greyson Ramos    DATE OF STUDY:  2022    Cardiovascular Diagnostics Department    Ordering Provider:  Neftali Martino MD    Primary Care Provider:  Joel Hall. Jovanny Rain DO    Interpreting Physician:  Greyson Ramos MD    MYOCARDIAL PERFUSION STRESS IMAGING    The stress ECG results are reported separately. NUCLEAR IMAGING RESULTS:  The overall quality of the study is excellent. Mild attenuation artifact was seen. There is no evidence of abnormal  lung uptake. Additionally, the right ventricle appears normal.  The  left ventricular cavity is noted to be normal in size on stress images. There is no evidence of transient ischemic dilatation (TID) of the left  ventricle. Gated SPECT imaging demonstrates global hypokinesis. The calculated  left ventricular ejection fraction was 50%. The rest images demonstrate homogenous tracer distribution throughout  the myocardium. SPECT images demonstrate homogenous tracer distribution throughout the  myocardium. IMPRESSION:  1. Normal myocardial perfusion imaging without evidence of significant  myocardial ischemia or infarction. 2.  Global left ventricular systolic function was normal without  regional wall motion abnormalities. Overall these results are most consistent with a low risk for  significant coronary artery disease. Mariano Ingram    D: 2022 9:17:21       T: 2022 9:18:33     [email or web address]  Job#: [id number]     Doc#: Unknown    CC:  Judge Gonzales.  Jovanny Rain DO

## 2022-01-16 ENCOUNTER — HOSPITAL ENCOUNTER (EMERGENCY)
Age: 22
Discharge: HOME | End: 2022-01-16
Attending: EMERGENCY MEDICINE
Payer: COMMERCIAL

## 2022-01-16 VITALS
RESPIRATION RATE: 18 BRPM | DIASTOLIC BLOOD PRESSURE: 81 MMHG | OXYGEN SATURATION: 98 % | HEART RATE: 112 BPM | WEIGHT: 227 LBS | HEIGHT: 72 IN | BODY MASS INDEX: 30.75 KG/M2 | TEMPERATURE: 100.2 F | SYSTOLIC BLOOD PRESSURE: 148 MMHG

## 2022-01-16 DIAGNOSIS — K08.89 PAIN, DENTAL: Primary | ICD-10-CM

## 2022-01-16 PROCEDURE — 6370000000 HC RX 637 (ALT 250 FOR IP): Performed by: EMERGENCY MEDICINE

## 2022-01-16 PROCEDURE — 99283 EMERGENCY DEPT VISIT LOW MDM: CPT

## 2022-01-16 PROCEDURE — 96372 THER/PROPH/DIAG INJ SC/IM: CPT

## 2022-01-16 PROCEDURE — 6360000002 HC RX W HCPCS: Performed by: EMERGENCY MEDICINE

## 2022-01-16 RX ORDER — KETOROLAC TROMETHAMINE 30 MG/ML
30 INJECTION, SOLUTION INTRAMUSCULAR; INTRAVENOUS ONCE
Status: COMPLETED | OUTPATIENT
Start: 2022-01-16 | End: 2022-01-16

## 2022-01-16 RX ORDER — LIDOCAINE HYDROCHLORIDE 20 MG/ML
15 SOLUTION OROPHARYNGEAL
Status: DISCONTINUED | OUTPATIENT
Start: 2022-01-16 | End: 2022-01-16 | Stop reason: HOSPADM

## 2022-01-16 RX ORDER — PENICILLIN V POTASSIUM 250 MG/1
500 TABLET ORAL ONCE
Status: COMPLETED | OUTPATIENT
Start: 2022-01-16 | End: 2022-01-16

## 2022-01-16 RX ORDER — KETOROLAC TROMETHAMINE 10 MG/1
10 TABLET, FILM COATED ORAL EVERY 8 HOURS PRN
Qty: 15 TABLET | Refills: 0 | Status: SHIPPED | OUTPATIENT
Start: 2022-01-16 | End: 2022-04-11 | Stop reason: ALTCHOICE

## 2022-01-16 RX ORDER — PENICILLIN V POTASSIUM 500 MG/1
500 TABLET ORAL 4 TIMES DAILY
Qty: 40 TABLET | Refills: 0 | Status: SHIPPED | OUTPATIENT
Start: 2022-01-16 | End: 2022-01-26

## 2022-01-16 RX ADMIN — Medication 15 ML: at 15:24

## 2022-01-16 RX ADMIN — BENZOCAINE: 200 GEL DENTAL; ORAL; PERIODONTAL at 15:24

## 2022-01-16 RX ADMIN — PENICILLIN V POTASSIUM 500 MG: 250 TABLET ORAL at 15:24

## 2022-01-16 RX ADMIN — KETOROLAC TROMETHAMINE 30 MG: 30 INJECTION, SOLUTION INTRAMUSCULAR; INTRAVENOUS at 15:23

## 2022-01-16 ASSESSMENT — PAIN DESCRIPTION - DESCRIPTORS: DESCRIPTORS: ACHING

## 2022-01-16 ASSESSMENT — PAIN DESCRIPTION - LOCATION: LOCATION: TEETH

## 2022-01-16 ASSESSMENT — PAIN DESCRIPTION - PAIN TYPE: TYPE: ACUTE PAIN

## 2022-01-16 ASSESSMENT — PAIN SCALES - GENERAL: PAINLEVEL_OUTOF10: 8

## 2022-01-16 NOTE — ED PROVIDER NOTES
HPI:  1/16/22,   Time: 3:12 PM JARVIS CORONA Yaya Ruiz is a 24 y.o. male presenting to the ED for left lower dental and gum pain that hurts when he swallows and radiates to his ear, beginning 1 day ago. The complaint has been constant, moderate in severity, and worsened by swallowing. .  Low-grade fever and chills and no difficulty breathing or chest pain or shortness of breath or cough  ROS:   Pertinent positives and negatives are stated within HPI, all other systems reviewed and are negative.  --------------------------------------------- PAST HISTORY ---------------------------------------------  Past Medical History:  has a past medical history of IBS (irritable bowel syndrome), Insomnia, Major depressive disorder, Migraine, and Panic attacks. Past Surgical History:  has a past surgical history that includes Tonsillectomy. Social History:  reports that he has never smoked. He has never used smokeless tobacco. He reports that he does not drink alcohol and does not use drugs. Family History: family history is not on file. The patients home medications have been reviewed. Allergies: Patient has no known allergies. -------------------------------------------------- RESULTS -------------------------------------------------  All laboratory and radiology results have been personally reviewed by myself   LABS:  No results found for this visit on 01/16/22. RADIOLOGY:  Interpreted by Radiologist.  No orders to display       ------------------------- NURSING NOTES AND VITALS REVIEWED ---------------------------   The nursing notes within the ED encounter and vital signs as below have been reviewed.    BP (!) 148/81   Pulse 112   Temp 100.2 °F (37.9 °C) (Oral)   Resp 18   Ht 6' (1.829 m)   Wt 227 lb (103 kg)   SpO2 98%   BMI 30.79 kg/m²   Oxygen Saturation Interpretation: Normal      ---------------------------------------------------PHYSICAL EXAM--------------------------------------      Constitutional/General: Alert and oriented x3, moderate distress secondary to pain  Head: NC/AT  Eyes: PERRL, EOMI  Mouth: Oropharynx clear, handling secretions, no trismus, percussion tenderness over tooth #17 and swelling and tenderness over the gum region of what had been the wisdom tooth left lower jaw  Neck: Supple, full ROM, no meningeal signs  Pulmonary: Lungs clear to auscultation bilaterally, no wheezes, rales, or rhonchi. Not in respiratory distress  Cardiovascular:  Regular rate and rhythm, no murmurs, gallops, or rubs. 2+ distal pulses  Abdomen: Soft, non tender, non distended,   Extremities: Moves all extremities x 4. Warm and well perfused  Skin: warm and dry without rash  Neurologic: GCS 15,  Psych: Normal Affect      ------------------------------ ED COURSE/MEDICAL DECISION MAKING----------------------  Medications   penicillin v potassium (VEETID) tablet 500 mg (has no administration in time range)   ketorolac (TORADOL) injection 30 mg (has no administration in time range)   benzocaine (ORAJEL) 20 % mucosal gel (has no administration in time range)   lidocaine viscous hcl (XYLOCAINE) 2 % solution 15 mL (has no administration in time range)         Medical Decision Making:    Suspected gingivitis/dental pain-we will treat with penicillin and Toradol and HurriCaine gel    Counseling: The emergency provider has spoken with the patient and discussed todays results, in addition to providing specific details for the plan of care and counseling regarding the diagnosis and prognosis. Questions are answered at this time and they are agreeable with the plan.      --------------------------------- IMPRESSION AND DISPOSITION ---------------------------------    IMPRESSION  1.  Pain, dental New Problem       DISPOSITION  Disposition: Discharge to home  Patient condition is stable                  Marshal Carney MD  01/16/22 2688

## 2022-01-16 NOTE — ED NOTES
Writer at bedside to administer meds. Patient reports \"It feels like I can't turn my head to the left now\". Writer gives meds and notifies Dr. Jenni Kurtz to go take 2nd look at patient.       Teresa Babcock RN  01/16/22 2830

## 2022-01-17 ENCOUNTER — APPOINTMENT (OUTPATIENT)
Dept: CT IMAGING | Age: 22
End: 2022-01-17
Payer: COMMERCIAL

## 2022-01-17 ENCOUNTER — APPOINTMENT (OUTPATIENT)
Dept: GENERAL RADIOLOGY | Age: 22
End: 2022-01-17
Payer: COMMERCIAL

## 2022-01-17 ENCOUNTER — TELEPHONE (OUTPATIENT)
Dept: CARDIOLOGY CLINIC | Age: 22
End: 2022-01-17

## 2022-01-17 ENCOUNTER — HOSPITAL ENCOUNTER (EMERGENCY)
Age: 22
Discharge: HOME | End: 2022-01-17
Attending: FAMILY MEDICINE
Payer: COMMERCIAL

## 2022-01-17 DIAGNOSIS — Z20.822 LAB TEST NEGATIVE FOR COVID-19 VIRUS: ICD-10-CM

## 2022-01-17 DIAGNOSIS — R59.9 REACTIVE LYMPHADENOPATHY: Primary | ICD-10-CM

## 2022-01-17 DIAGNOSIS — R22.0 LEFT FACIAL SWELLING: ICD-10-CM

## 2022-01-17 LAB
ABSOLUTE EOS #: 0.1 K/UL (ref 0–0.4)
ABSOLUTE IMMATURE GRANULOCYTE: ABNORMAL K/UL (ref 0–0.3)
ABSOLUTE LYMPH #: 2.1 K/UL (ref 1–4.8)
ABSOLUTE MONO #: 0.9 K/UL (ref 0–1)
ANION GAP SERPL CALCULATED.3IONS-SCNC: 17 MMOL/L (ref 9–17)
BASOPHILS # BLD: 1 % (ref 0–2)
BASOPHILS ABSOLUTE: 0 K/UL (ref 0–0.2)
BUN BLDV-MCNC: 11 MG/DL (ref 6–20)
BUN/CREAT BLD: ABNORMAL (ref 9–20)
CALCIUM SERPL-MCNC: 9.4 MG/DL (ref 8.6–10.4)
CHLORIDE BLD-SCNC: 99 MMOL/L (ref 98–107)
CO2: 22 MMOL/L (ref 20–31)
CREAT SERPL-MCNC: 1.17 MG/DL (ref 0.7–1.2)
D-DIMER QUANTITATIVE: 0.35 MG/L FEU (ref 0–0.59)
DIFFERENTIAL TYPE: YES
EOSINOPHILS RELATIVE PERCENT: 1 % (ref 0–5)
GFR AFRICAN AMERICAN: >60 ML/MIN
GFR NON-AFRICAN AMERICAN: >60 ML/MIN
GFR SERPL CREATININE-BSD FRML MDRD: ABNORMAL ML/MIN/{1.73_M2}
GFR SERPL CREATININE-BSD FRML MDRD: ABNORMAL ML/MIN/{1.73_M2}
GLUCOSE BLD-MCNC: 102 MG/DL (ref 70–99)
HCT VFR BLD CALC: 40.6 % (ref 41–53)
HEMOGLOBIN: 13.9 G/DL (ref 13.5–17.5)
IMMATURE GRANULOCYTES: ABNORMAL %
LACTIC ACID, SEPSIS WHOLE BLOOD: NORMAL MMOL/L (ref 0.5–1.9)
LACTIC ACID, SEPSIS: 1 MMOL/L (ref 0.5–1.9)
LYMPHOCYTES # BLD: 27 % (ref 13–44)
MCH RBC QN AUTO: 28.2 PG (ref 26–34)
MCHC RBC AUTO-ENTMCNC: 34.2 G/DL (ref 31–37)
MCV RBC AUTO: 82.5 FL (ref 80–100)
MONOCYTES # BLD: 11 % (ref 5–9)
NRBC AUTOMATED: ABNORMAL PER 100 WBC
PDW BLD-RTO: 13.9 % (ref 12.1–15.2)
PLATELET # BLD: 331 K/UL (ref 140–450)
PLATELET ESTIMATE: ABNORMAL
PMV BLD AUTO: ABNORMAL FL (ref 6–12)
POTASSIUM SERPL-SCNC: 4.1 MMOL/L (ref 3.7–5.3)
RBC # BLD: 4.93 M/UL (ref 4.5–5.9)
RBC # BLD: ABNORMAL 10*6/UL
SARS-COV-2, RAPID: NOT DETECTED
SEG NEUTROPHILS: 60 % (ref 39–75)
SEGMENTED NEUTROPHILS ABSOLUTE COUNT: 4.7 K/UL (ref 2.1–6.5)
SODIUM BLD-SCNC: 138 MMOL/L (ref 135–144)
SPECIMEN DESCRIPTION: NORMAL
TROPONIN INTERP: NORMAL
TROPONIN T: NORMAL NG/ML
TROPONIN, HIGH SENSITIVITY: 6 NG/L (ref 0–22)
WBC # BLD: 7.7 K/UL (ref 4.5–13.5)
WBC # BLD: ABNORMAL 10*3/UL

## 2022-01-17 PROCEDURE — 70491 CT SOFT TISSUE NECK W/DYE: CPT

## 2022-01-17 PROCEDURE — 80048 BASIC METABOLIC PNL TOTAL CA: CPT

## 2022-01-17 PROCEDURE — 85379 FIBRIN DEGRADATION QUANT: CPT

## 2022-01-17 PROCEDURE — 83605 ASSAY OF LACTIC ACID: CPT

## 2022-01-17 PROCEDURE — 99284 EMERGENCY DEPT VISIT MOD MDM: CPT

## 2022-01-17 PROCEDURE — 93005 ELECTROCARDIOGRAM TRACING: CPT | Performed by: FAMILY MEDICINE

## 2022-01-17 PROCEDURE — 85025 COMPLETE CBC W/AUTO DIFF WBC: CPT

## 2022-01-17 PROCEDURE — 71045 X-RAY EXAM CHEST 1 VIEW: CPT

## 2022-01-17 PROCEDURE — 84484 ASSAY OF TROPONIN QUANT: CPT

## 2022-01-17 PROCEDURE — 6360000004 HC RX CONTRAST MEDICATION: Performed by: FAMILY MEDICINE

## 2022-01-17 PROCEDURE — 87635 SARS-COV-2 COVID-19 AMP PRB: CPT

## 2022-01-17 RX ADMIN — IOPAMIDOL 75 ML: 755 INJECTION, SOLUTION INTRAVENOUS at 23:04

## 2022-01-17 ASSESSMENT — PAIN DESCRIPTION - DESCRIPTORS: DESCRIPTORS: DISCOMFORT

## 2022-01-17 ASSESSMENT — PAIN DESCRIPTION - PAIN TYPE: TYPE: ACUTE PAIN

## 2022-01-17 ASSESSMENT — PAIN SCALES - GENERAL: PAINLEVEL_OUTOF10: 8

## 2022-01-17 ASSESSMENT — PAIN DESCRIPTION - FREQUENCY: FREQUENCY: CONTINUOUS

## 2022-01-17 ASSESSMENT — PAIN DESCRIPTION - ONSET: ONSET: PROGRESSIVE

## 2022-01-18 VITALS
HEART RATE: 86 BPM | HEIGHT: 72 IN | SYSTOLIC BLOOD PRESSURE: 139 MMHG | DIASTOLIC BLOOD PRESSURE: 92 MMHG | RESPIRATION RATE: 14 BRPM | WEIGHT: 227 LBS | BODY MASS INDEX: 30.75 KG/M2 | TEMPERATURE: 99.1 F | OXYGEN SATURATION: 97 %

## 2022-01-18 LAB
EKG ATRIAL RATE: 98 BPM
EKG P AXIS: 31 DEGREES
EKG P-R INTERVAL: 130 MS
EKG Q-T INTERVAL: 328 MS
EKG QRS DURATION: 80 MS
EKG QTC CALCULATION (BAZETT): 418 MS
EKG R AXIS: 43 DEGREES
EKG T AXIS: -14 DEGREES
EKG VENTRICULAR RATE: 98 BPM

## 2022-01-18 PROCEDURE — 93010 ELECTROCARDIOGRAM REPORT: CPT | Performed by: INTERNAL MEDICINE

## 2022-01-18 ASSESSMENT — ENCOUNTER SYMPTOMS
SHORTNESS OF BREATH: 1
BACK PAIN: 1

## 2022-01-18 NOTE — ED PROVIDER NOTES
[facility]  eMERGENCY dEPARTMENT eNCOUnter          CHIEF COMPLAINT       Chief Complaint   Patient presents with    Other     Pt states he came to the ER yesterday for dental pain, he went to the dentist today & was told he has an infection. Pt now c/o SOB, chest tightness, back pain. Nurses Notes reviewed and I agree except as noted in the HPI. HISTORY OF PRESENT ILLNESS    Sabas Davis is a 24 y.o. male who presents to the emergency room via private vehicle, patient complaining of breath, chest tightness, and some back discomfort. Patient states was seen in the emergency room yesterday and diagnosed with possible dental infection started on antibiotics, had gone to see his dentist today who advised him that he does not see any infection on x-ray but sent him to a specialist tomorrow for further evaluation, began developing symptoms afterwards. Onset of symptoms approximately 1600 hrs. Patient rates the discomfort 8 out of 10, describing continuous ache in his mid chest and more sharp pain in the left lateral aspects of his chest wall. Indicates intermittent pain with inspiration. Patient has no known cardiac history himself, but does have family history of cardiac problems as well as blood clots in his mother. Patient does vape. PCP: Jean    REVIEW OF SYSTEMS     Review of Systems   Respiratory: Positive for shortness of breath. Cardiovascular: Positive for chest pain. Musculoskeletal: Positive for back pain. All other systems reviewed and are negative. PAST MEDICAL HISTORY    has a past medical history of IBS (irritable bowel syndrome), Insomnia, Major depressive disorder, Migraine, and Panic attacks. SURGICAL HISTORY      has a past surgical history that includes Tonsillectomy.     CURRENT MEDICATIONS       Discharge Medication List as of 1/17/2022 11:42 PM      CONTINUE these medications which have NOT CHANGED    Details   penicillin v potassium (VEETID) 500 MG tablet Take 1 tablet by mouth 4 times daily for 10 days, Disp-40 tablet, R-0Normal      ketorolac (TORADOL) 10 MG tablet Take 1 tablet by mouth every 8 hours as needed for Pain, Disp-15 tablet, R-0Normal      hydrOXYzine (ATARAX) 25 MG tablet Take 25 mg by mouth 3 times daily as needed for ItchingHistorical Med      naproxen sodium (ANAPROX) 550 MG tablet Take 550 mg by mouth 2 times daily (with meals) Take 2 tables as neededHistorical Med      venlafaxine (EFFEXOR XR) 75 MG extended release capsule Take 1 capsule by mouth daily, Disp-90 capsule, R-1Normal             ALLERGIES     has No Known Allergies. FAMILY HISTORY     has no family status information on file. family history is not on file. SOCIAL HISTORY      reports that he has never smoked. He has never used smokeless tobacco. He reports that he does not drink alcohol and does not use drugs. PHYSICAL EXAM     INITIAL VITALS:  height is 6' (1.829 m) and weight is 227 lb (103 kg). His temperature is 99.1 °F (37.3 °C). His blood pressure is 139/92 (abnormal) and his pulse is 86. His respiration is 14 and oxygen saturation is 97%. Physical Exam   Constitutional: Patient is oriented to person, place, and time. Patient appears well-developed and well-nourished. Patient is active and cooperative. HENT:   Head: Normocephalic and atraumatic. Head is without contusion. There is subtle swelling over the left aspect of patient's face. Negative mastoid tenderness. Right Ear: Hearing and external ear normal. No drainage. Clear TM  Left Ear: Hearing and external ear normal. No drainage. Clear TM  Nose: Nose normal. No nasal deformity. No epistaxis. Mouth/Throat: Mucous membranes are not dry. Negative oral lesions or exudate, there is no gross asymmetry, erythema, or abscess of the gumlines or posterior pharynx  Eyes: EOMI. Conjunctivae, sclera, and lids are normal. Right eye exhibits no discharge.  Left eye exhibits no discharge. Neck: Full passive range of motion without pain and phonation normal.   Cardiovascular:  Normal rate, regular rhythm and intact distal pulses. No lower extremity edema. Negative Homans' sign  Pulses: Right radial pulse  2+   Pulmonary/Chest: Effort normal. No tachypnea and no bradypnea. No wheezes, rhonchi, or rales. Abdominal: Soft. Patient without distension or tenderness  Musculoskeletal:   Negative acute trauma or deformity,  apparent full range of motion and normal strength all extremities appropriate to age. Neurological: Patient is alert and oriented to person, place, and time. patient displays no tremor. Patient displays no seizure activity. .  Lymphatic: No gross cervical lymphadenopathy  Skin: Skin is warm and dry. Patient is not diaphoretic. Psychiatric: Patient has a normal mood and affect. Patient speech is normal and behavior is normal. Cognition and memory are normal.    DIFFERENTIAL DIAGNOSIS:   ACS, AA, PE, GERD/gastritis, PTX, anxiety, msk    DIAGNOSTIC RESULTS     EKG: All EKG's are interpreted by the Emergency Department Physician who either signs or Co-signs this chart in the absence of a cardiologist.  EKG    The patient had an EKG which is interpreted by me in the absence of a Cardiologist.   [] Without comparison to previous. [x] With comparison to a previous EKG Dated 12/25/2021    EKG @ 2106 hrs -sinus rhythm rate 98 normal axis normal normals, noted T wave abnormality inferior leads, as compared to prior EKG there is no change morphology including T wave abnormality      RADIOLOGY: non-plain film images(s) such as CT, Ultrasound and MRI are read by the radiologist.  CT SOFT TISSUE NECK W CONTRAST   Final Result   1. Very subtle soft tissue swelling adjacent to the maxilla on the left without    drainable fluid collection. No periapical lucency or bony dehiscence is seen. XR CHEST PORTABLE   Final Result      No acute cardiopulmonary process.               LABS: Labs Reviewed   BASIC METABOLIC PANEL - Abnormal; Notable for the following components:       Result Value    Glucose 102 (*)     All other components within normal limits   CBC WITH AUTO DIFFERENTIAL - Abnormal; Notable for the following components:    Hematocrit 40.6 (*)     Monocytes 11 (*)     All other components within normal limits   COVID-19, RAPID   TROPONIN   LACTATE, SEPSIS   D-DIMER, QUANTITATIVE       EMERGENCY DEPARTMENT COURSE:   Vitals:    Vitals:    01/17/22 2303 01/17/22 2315 01/17/22 2330 01/17/22 2345   BP: (!) 141/90 139/89 (!) 141/89 (!) 139/92   Pulse: 88 82 83 86   Resp: 24 22 23 14   Temp:       SpO2: 99% 96% 98% 97%   Weight:       Height:         Patient history and physical exam taken at bedside, discussed patient symptoms and exam findings, discussed initial work-up to include EKG chest x-ray blood work, IV access.   Patient sitting bed semi-Fowlers, acknowledges    EKG as above    EMR reviewed, including cardiac stress test exercise only 1/5/2022 that did show some abnormalities, patient was later sent for nuclear stress testing 1/11/2022, with patient showing ST depression inferior laterally that was asymptomatic, myocardial imaging showing normal perfusion, with overall low risk for significant coronary artery disease    Chest x-ray reviewed    Initial labs reviewed, noting normal CBC and differential, normal BMP, lactic 1.0, hsTnT 6, DDimer 0.35    Rapid COVID-19 test negative    Discussed with patient his overall work-up, discussed patient's continued swelling and both per nursing is seen the patient yesterday as well as girlfriend now at bedside, patient's increased swelling of the left aspect of his face, for this reason like to get CT soft tissue neck to rule out retropharyngeal abscess or parapharyngeal abscess not otherwise appreciated on exam, patient acknowledges    CT soft tissue neck report reviewed    Discussed with patient CT findings not show any gross fluid or abscess collection, discussed enlarged lymph node, at this time I feel we can safely discharge patient home, will continue with the current antibiotic, Motrin/Tylenol for discomfort, can use heat versus ice for comfort though advised to be careful not to irritate or burn the skin, continue with his stated dental specialist appointment 1/18/2022, otherwise follow-up with primary care, return to ER if symptoms change worsen or other concerns, patient acknowledges        FINAL IMPRESSION      1. Reactive lymphadenopathy    2. Left facial swelling    3. Lab test negative for COVID-19 virus          DISPOSITION/PLAN   D/c    PATIENT REFERRED TO:  PLease keep your stated dental specialist appointment in [place], [place], 1/18/2022    In 1 day      [place] Gloria, DO  [address] [facility], [address]  [phone]    Call   As needed    [facility] ED  [address] [place] [address]  [phone]    As needed, If symptoms worsen      DISCHARGE MEDICATIONS:  Discharge Medication List as of 1/17/2022 11:42 PM              Summation      Patient Course:  D/c    ED Medications administered this visit:    Medications   iopamidol (ISOVUE-370) 76 % injection 75 mL (75 mLs IntraVENous Given 1/17/22 3830)       New Prescriptions from this visit:    Discharge Medication List as of 1/17/2022 11:42 PM          Follow-up:  PLease keep your stated dental specialist appointment in [place], [place], 1/18/2022    In 1 day      Portland Gloria, DO  [facility] [phone]    Call   As needed    [facility] ED  [address] [place] [address] [phone]    As needed, If symptoms worsen        Final Impression:   1. Reactive lymphadenopathy    2. Left facial swelling    3.  Lab test negative for COVID-19 virus               (Please note that portions of this note were completed with a voice recognition program.  Efforts were made to edit the dictations but occasionally words are mis-transcribed.)    MD Yandel Bueno MD  01/18/22 2043

## 2022-02-01 ENCOUNTER — HOSPITAL ENCOUNTER (EMERGENCY)
Age: 22
Discharge: HOME | End: 2022-02-01
Attending: EMERGENCY MEDICINE
Payer: COMMERCIAL

## 2022-02-01 ENCOUNTER — APPOINTMENT (OUTPATIENT)
Dept: GENERAL RADIOLOGY | Age: 22
End: 2022-02-01
Payer: COMMERCIAL

## 2022-02-01 VITALS
WEIGHT: 227.7 LBS | DIASTOLIC BLOOD PRESSURE: 91 MMHG | SYSTOLIC BLOOD PRESSURE: 137 MMHG | OXYGEN SATURATION: 98 % | BODY MASS INDEX: 30.88 KG/M2 | TEMPERATURE: 97.8 F | HEART RATE: 111 BPM | RESPIRATION RATE: 20 BRPM

## 2022-02-01 DIAGNOSIS — R53.1 GENERALIZED WEAKNESS: ICD-10-CM

## 2022-02-01 DIAGNOSIS — U07.1 COVID-19 VIRUS INFECTION: Primary | ICD-10-CM

## 2022-02-01 PROCEDURE — 99282 EMERGENCY DEPT VISIT SF MDM: CPT

## 2022-02-01 PROCEDURE — 71045 X-RAY EXAM CHEST 1 VIEW: CPT

## 2022-02-01 ASSESSMENT — PAIN SCALES - GENERAL: PAINLEVEL_OUTOF10: 6

## 2022-02-02 NOTE — ED PROVIDER NOTES
[facility] COMPLAINT    Chief Complaint   Patient presents with    Positive For Covid-19     Pt tested postive for Coivd yesterday and has had increased fatigue and shortness of breath and tightness in chest when he takes a deep breath in and painful from coughing. LEISA Lima is a 24 y.o. male who presentsto ED from home. By car. With complaint of generalized weakness, headaches and shortness of breath. Patient also has chest tightness  Onset was 2 days. Patient tested positive for COVID-19 yesterday. Intensity of symptoms moderate. Patient presents to ED with shortness of breath, generalized weakness and chest tightness        PAST MEDICAL HISTORY    Past Medical History:   Diagnosis Date    IBS (irritable bowel syndrome)     Insomnia     Major depressive disorder     Migraine     Panic attacks 05/27/2021       SURGICAL HISTORY    Past Surgical History:   Procedure Laterality Date    TONSILLECTOMY         CURRENT MEDICATIONS    Current Outpatient Rx   Medication Sig Dispense Refill    ketorolac (TORADOL) 10 MG tablet Take 1 tablet by mouth every 8 hours as needed for Pain 15 tablet 0    hydrOXYzine (ATARAX) 25 MG tablet Take 25 mg by mouth 3 times daily as needed for Itching      naproxen sodium (ANAPROX) 550 MG tablet Take 550 mg by mouth 2 times daily (with meals) Take 2 tables as needed      venlafaxine (EFFEXOR XR) 75 MG extended release capsule Take 1 capsule by mouth daily 90 capsule 1       ALLERGIES    No Known Allergies    FAMILY HISTORY    No family history on file.     SOCIAL HISTORY    Social History     Socioeconomic History    Marital status: Single     Spouse name: Not on file    Number of children: Not on file    Years of education: Not on file    Highest education level: Not on file   Occupational History    Not on file   Tobacco Use    Smoking status: Never Smoker    Smokeless tobacco: Never Used   Vaping Use    Vaping Use: Every day    Substances: Never   Substance and Sexual Activity    Alcohol use: No    Drug use: No    Sexual activity: Not on file   Other Topics Concern    Not on file   Social History Narrative    Not on file     Social Determinants of Health     Financial Resource Strain: Low Risk     Difficulty of Paying Living Expenses: Not hard at all   Food Insecurity: No Food Insecurity    Worried About Running Out of Food in the Last Year: Never true    Latoya of Food in the Last Year: Never true   Transportation Needs:     Lack of Transportation (Medical): Not on file    Lack of Transportation (Non-Medical):  Not on file   Physical Activity:     Days of Exercise per Week: Not on file    Minutes of Exercise per Session: Not on file   Stress:     Feeling of Stress : Not on file   Social Connections:     Frequency of Communication with Friends and Family: Not on file    Frequency of Social Gatherings with Friends and Family: Not on file    Attends Judaism Services: Not on file    Active Member of [address] [place] [address] or Organizations: Not on file    Attends Club or Organization Meetings: Not on file    Marital Status: Not on file   Intimate Partner Violence:     Fear of Current or Ex-Partner: Not on file    Emotionally Abused: Not on file    Physically Abused: Not on file    Sexually Abused: Not on file   Housing Stability:     Unable to Pay for Housing in the Last Year: Not on file    Number of [place] in the Last Year: Not on file    Unstable Housing in the Last Year: Not on file           Review of Systems:  Constitutional: Positive for fatigue and dizziness  Eyes:  Denies photophobia or discharge   HENT:  Denies sore throat or ear pain   Respiratory: Positive for cough and shortness of breath and cardiovascular:  Denies chest pain, palpitations or swelling   GI:  Denies abdominal pain, nausea, vomiting, or diarrhea   Musculoskeletal:  Denies back pain   Skin:  Denies rash   Neurologic: headache  Endocrine:  Denies polyuria or polydypsia   Lymphatic:  Denies swollen glands   Psychiatric:  Denies depression, suicidal ideation or homicidal ideation   All systems negative except as marked. PHYSICAL EXAM    VITAL SIGNS: BP (!) 137/91   Pulse 111   Temp 97.8 °F (36.6 °C) (Oral)   Resp 20   Wt 227 lb 11.2 oz (103.3 kg)   SpO2 98%   BMI 30.88 kg/m²    Constitutional: Ill-appearing male in no acute distress not hypoxic HENT:  Normocephalic, Atraumatic, Bilateral external ears normal, Oropharynx moist, No oral exudates, Nose normal. Neck- Normal range of motion, No tenderness, Supple, No stridor. Eyes:  PERRL, EOMI, Conjunctiva normal, No discharge. Respiratory:  Normal breath sounds, No respiratory distress, No wheezing, No chest tenderness. Cardiovascular:  Normal heart rate, Normal rhythm, No murmurs, No rubs, No gallops. GI:  Bowel sounds normal, Soft, No tenderness, No masses, No pulsatile masses. : External genitalia appear normal, No masses or lesions. No discharge. No CVA tenderness. Musculoskeletal:  Intact distal pulses, No edema, No tenderness, No cyanosis, No clubbing. Good range of motion in all major joints. No tenderness to palpation or major deformities noted. Back- No tenderness. Integument:  Warm, Dry, No erythema, No rash. Lymphatic:  No lymphadenopathy noted. Neurologic:  Alert & oriented x 3, Normal motor function, Normal sensory function, No focal deficits noted. Psychiatric:  Affect normal, Judgment normal, Mood normal.     EKG        RADIOLOGY    XR CHEST PORTABLE   Preliminary Result   Preliminary report only      IMPRESSION:       No acute pulmonary findings                      PROCEDURES        Labs  Labs Reviewed - No data to display          Summation      Patient Course: Chest x-ray with no acute findings. Oxygen saturation within normal limits. Patient is not hypoxic. Patient will be sent home. Warning signs were discussed.   Return to ED if worse.  Supportive care as discussed. ED Medications administered this visit:  Medications - No data to display    New Prescriptions from this visit:    New Prescriptions    No medications on file       Follow-up:  Ja Parada DO  [address] [facility],  [address]  [phone]    In 2 days  Return to ED if worse        Final Impression:   1. COVID-19 virus infection    2.  Generalized weakness               (Please note that portions of this note were completed with a voice recognition program.  Efforts were made to edit the dictations but occasionally words are mis-transcribed.)        Pari Dueñas MD  02/01/22 8442

## 2022-03-09 ENCOUNTER — OFFICE VISIT (OUTPATIENT)
Dept: FAMILY MEDICINE CLINIC | Age: 22
End: 2022-03-09
Payer: COMMERCIAL

## 2022-03-09 VITALS
RESPIRATION RATE: 18 BRPM | DIASTOLIC BLOOD PRESSURE: 88 MMHG | OXYGEN SATURATION: 96 % | HEART RATE: 99 BPM | WEIGHT: 232.6 LBS | BODY MASS INDEX: 31.51 KG/M2 | HEIGHT: 72 IN | SYSTOLIC BLOOD PRESSURE: 138 MMHG

## 2022-03-09 DIAGNOSIS — L29.8 PRURITIC ERYTHEMATOUS RASH: ICD-10-CM

## 2022-03-09 DIAGNOSIS — K21.9 GASTROESOPHAGEAL REFLUX DISEASE WITHOUT ESOPHAGITIS: Primary | ICD-10-CM

## 2022-03-09 DIAGNOSIS — L30.0 NUMMULAR ECZEMA: ICD-10-CM

## 2022-03-09 PROCEDURE — 99214 OFFICE O/P EST MOD 30 MIN: CPT | Performed by: STUDENT IN AN ORGANIZED HEALTH CARE EDUCATION/TRAINING PROGRAM

## 2022-03-09 RX ORDER — TRIAMCINOLONE ACETONIDE 1 MG/G
CREAM TOPICAL
Qty: 453.6 G | Refills: 0 | Status: SHIPPED | OUTPATIENT
Start: 2022-03-09 | End: 2022-04-11 | Stop reason: ALTCHOICE

## 2022-03-09 ASSESSMENT — ENCOUNTER SYMPTOMS
SORE THROAT: 0
COUGH: 0
ABDOMINAL PAIN: 0
BACK PAIN: 0
NAUSEA: 0
DIARRHEA: 0
WHEEZING: 0
SINUS PAIN: 0
VOMITING: 0

## 2022-03-09 NOTE — PATIENT INSTRUCTIONS
Chance,    Thank you for coming to see me today! I believe that our main problem is Numular eczema and GERD. Here's what I would recommend we do:     my prescription for kenalog cream.  Rub it on the rash twice a day for 7-10 days   Come back to see me in 2 weeks    We also discussed your GERD. STOP omeprazole  Start Famotidine 20 mg twice a day or 40 mg once a day. Please review the documents I'm attaching related to what we discussed today. Please give me a call or message me on [employer] if you have any problems or questions, and I will see you at your next visit. Dr. Chuck Freeman        Patient Education        Atopic Dermatitis: Care Instructions  Overview  Atopic dermatitis (also called eczema) is a skin problem that causes intense itching and a red, raised rash. In severe cases, the rash develops clear fluid-filled blisters. The rash is not contagious. You can't catch it from others. People with this condition seem to have very sensitive immune systems that are likely to react to things that cause allergies. The immune system is the body's way of fighting infection. There is no cure for atopic dermatitis. But you may be able to control it with care at home. Follow-up care is a key part of your treatment and safety. Be sure to make and go to all appointments, and call your doctor if you are having problems. It's also a good idea to know your test results and keep a list of the medicines you take. How can you care for yourself at home? · Use moisturizer at least twice a day. · If your doctor prescribes a cream, use it as directed. If your doctor prescribes other medicine, take it exactly as directed. · Wash the affected area with warm (not hot) water only. Soap can make dryness and itching worse. Pat dry. · Apply a moisturizer after bathing. Use a cream such as Cetaphil, Lubriderm, or Moisturel that does not irritate the skin or cause a rash.  Apply the cream while your skin is still damp after lightly drying with a towel. · Use cold, wet cloths to reduce itching. · Keep cool, and stay out of the sun. · If itching affects your sleep, ask your doctor if you can take an antihistamine that might reduce itching and make you sleepy, such as diphenhydramine (Benadryl). Be safe with medicines. Read and follow all instructions on the label. · Control scratching. Keep your fingernails trimmed and smooth to prevent damage to the skin when you scratch it. Wearing cotton mittens or gloves can help you stop scratching. · Try to avoid things that trigger your rash. These may include things like allergens, such as pollen or animal dander. Harsh soaps, scratchy clothes, stress, and some foods are other examples. When should you call for help? Call your doctor now or seek immediate medical care if:    · Your rash gets worse and you have a fever.     · You have new blisters or bruises, or the rash spreads and looks like a sunburn.     · You have signs of infection, such as:  ? Increased pain, swelling, warmth, or redness. ? Red streaks leading from the rash. ? Pus draining from the rash. ? A fever.     · You have crusting or oozing sores.     · You have joint aches or body aches along with your rash. Watch closely for changes in your health, and be sure to contact your doctor if:    · Your rash does not clear up after 2 to 3 weeks of home treatment.     · Itching interferes with your sleep or daily activities. Where can you learn more? Go to https://[employer] Media.Satispay. org and sign in to your [employer] account. Enter N152 in the Newtricious box to learn more about \"Atopic Dermatitis: Care Instructions. \"     If you do not have an account, please click on the \"Sign Up Now\" link. Current as of: March 3, 2021               Content Version: 13.1  © 5877-3632 Healthwise, Inc[employer]. Care instructions adapted under license by [facility] ([facility]).  If you have questions about a medical condition or this instruction, always ask your healthcare professional. [address] any warranty or liability for your use of this information. Patient Education        Hand and Foot Dermatitis: Care Instructions  Overview     Hand and foot dermatitis is a skin problem. It causes itching, pain, and blisters on the palms of the hands or sides of the fingers, or on the soles of the feet. It can last for several weeks before the blisters dry up and the skin peels. Sometimes the skin turns darker after the blisters heal. If you have flare-ups often, the skin can become red and cracked. Common triggers include chemicals, like detergents and soaps. Other triggers are having wet or sweaty hands or feet; metals, such as nickel and cobalt; and stress. People who work with their hands or feet in water are more likely to have hand and foot dermatitis. Some examples are food handlers, , and hairdressers. The condition isn't contagious. For some people, this condition may come and go. But flare-ups may be controlled with home care and medical treatment. Follow-up care is a key part of your treatment and safety. Be sure to make and go to all appointments, and call your doctor if you are having problems. It's also a good idea to know your test results and keep a list of the medicines you take. How can you care for yourself at home? · If your doctor prescribes a cream, ointment, or other medicine, use it exactly as directed. If your doctor suggests a home treatment, such as soaks in Burow's solution, use it as directed. · Wash the affected area with water only. Remove rings, watches, and other jewelry first. If you use a cleanser, use one without fragrance or soap. Soap can make dryness and itching worse. · Apply a moisturizer or barrier cream as often as you can. Use a cream such as CeraVe or Cetaphil that doesn't irritate the skin or cause a rash. Apply the cream every time after you wash.  Apply it while your skin is still damp after drying lightly with a towel. · At night, apply petroleum jelly. It protects the skin and keeps it from drying out. If you can, apply it more often. · Wear cotton gloves under work gloves when [place], clean, garden, or work with water. · Wear clean, dry socks. Change your socks when they get wet or sweaty. Wearing moisture-wicking socks can help your feet stay dry. · Try to figure out if something triggers your symptoms. Avoid things that make them worse, and anything that causes burning or itching. · Manage or reduce your stress. Stress can make your symptoms worse. · Avoid scratching. This can lead to skin infections, rough patches of skin, or more itching. If itching affects your sleep or your normal activities, ask your doctor about treatments you can try. When should you call for help? Call your doctor now or seek immediate medical care if:    · You have signs of infection, such as:  ? Increased pain, swelling, warmth, or redness. ? Red streaks leading from the rash. ? Pus draining from the rash. ? A fever.     · You have crusting or oozing sores.     · You have joint aches or body aches along with your rash. Watch closely for changes in your health, and be sure to contact your doctor if:    · Itching interferes with your sleep or daily activities.     · You do not get better as expected. Current as of: March 3, 2021               Content Version: 13.1  © 2006-2021 [facility]. Care instructions adapted under license by [facility] ([facility]). If you have questions about a medical condition or this instruction, always ask your healthcare professional. [place] [address] any warranty or liability for your use of this information.

## 2022-03-09 NOTE — PROGRESS NOTES
HPI Notes    Name: Yandel Plaza  : 2000         Chief Complaint:     Chief Complaint   Patient presents with    Rash     x 2 months right axillary area     Gastroesophageal Reflux     pt stated he has taking OTC medication with mild relief       History of Present Illness:      HPI    This is a very nice 24year old man presenting for evaluation of a rash and GERD. Rash: this has been ongoing since [place] (approximately 2.5 months), it is itchy but not painful. He has switched his Deoderant and shampoo but there is no improvement. It started in the right axilla, but has now spread to the left axilla and anterior neck. He has also tried an OTC triple abx cream. They have started as erythematous patches or macules but have no scale. GERD: this has been an ongoing problem for about 2 years. It is more consistent and bothersome. He has been using OTC omeprazole to modest effect. It is worse \"with any foods\". There is no n/v or abdominal pain. It is worse at night while lying down and produces heartburn. Past Medical History:     Past Medical History:   Diagnosis Date    IBS (irritable bowel syndrome)     Insomnia     Major depressive disorder     Migraine     Panic attacks 2021      Reviewed all health maintenance requirements and ordered appropriate tests  Health Maintenance Due   Topic Date Due    Hepatitis C screen  Never done    COVID-19 Vaccine (1) Never done    HPV vaccine (1 - Male 2-dose series) Never done    HIV screen  Never done    Flu vaccine (1) 2021       Past Surgical History:     Past Surgical History:   Procedure Laterality Date    TONSILLECTOMY          Medications:       Prior to Admission medications    Medication Sig Start Date End Date Taking? Authorizing Provider   triamcinolone (KENALOG) 0.1 % cream Apply topically 2 times daily.  3/9/22  Yes Rina Pena,    ALPRAZolam Lynnie Lesch) 0.5 MG tablet Take 1 tablet by mouth nightly as needed for Sleep or Anxiety for up to 30 days. 2/18/22 3/20/22 Yes Monalisa Deras, DO   naproxen sodium (ANAPROX) 550 MG tablet Take 550 mg by mouth 2 times daily (with meals) Take 2 tables as needed   Yes Historical Provider, MD   venlafaxine (EFFEXOR XR) 75 MG extended release capsule Take 1 capsule by mouth daily 12/2/21  Yes Keke Pena DO   ketorolac (TORADOL) 10 MG tablet Take 1 tablet by mouth every 8 hours as needed for Pain  Patient not taking: Reported on 3/9/2022 1/16/22   Greg Gilbert MD   hydrOXYzine (ATARAX) 25 MG tablet Take 25 mg by mouth 3 times daily as needed for Itching  Patient not taking: Reported on 3/9/2022    Historical Provider, MD        Allergies:       Patient has no known allergies. Social History:     Tobacco:    reports that he has never smoked. He has never used smokeless tobacco.  Alcohol:      reports no history of alcohol use. Drug Use:  reports no history of drug use. Family History:     No family history on file. Review of Systems:       Review of Systems   Constitutional: Negative for fever. HENT: Negative for sinus pain, sneezing and sore throat. Respiratory: Negative for cough and wheezing. Cardiovascular: Negative for chest pain. Gastrointestinal: Negative for abdominal pain, diarrhea, nausea and vomiting. Heartburn   Musculoskeletal: Negative for back pain. Skin: Positive for rash. Hematological: Negative for adenopathy. Psychiatric/Behavioral: Negative for sleep disturbance. Physical Exam:     Vitals:  /88   Pulse 99   Resp 18   Ht 6' (1.829 m)   Wt 232 lb 9.6 oz (105.5 kg)   SpO2 96%   BMI 31.55 kg/m²       Physical Exam  Vitals and nursing note reviewed. Constitutional:       General: He is not in acute distress. Appearance: Normal appearance. He is obese. Musculoskeletal:         General: No swelling or tenderness. Normal range of motion. Skin:     General: Skin is warm and dry.       Capillary Refill: Capillary refill takes less than 2 seconds. Neurological:      General: No focal deficit present. Mental Status: He is alert and oriented to person, place, and time. Mental status is at baseline. Psychiatric:         Mood and Affect: Mood normal.         Behavior: Behavior normal.         Thought Content: Thought content normal.                     Data:     Lab Results   Component Value Date     01/17/2022    K 4.1 01/17/2022    CL 99 01/17/2022    CO2 22 01/17/2022    BUN 11 01/17/2022    CREATININE 1.17 01/17/2022    GLUCOSE 102 01/17/2022    GLUCOSE 93 11/25/2011    PROT 7.2 12/13/2021    LABALBU 4.2 12/13/2021    BILITOT 0.30 12/13/2021    ALKPHOS 128 12/13/2021    AST 21 12/13/2021    ALT 27 12/13/2021     Lab Results   Component Value Date    WBC 7.7 01/17/2022    RBC 4.93 01/17/2022    RBC 4.52 11/25/2011    HGB 13.9 01/17/2022    HCT 40.6 01/17/2022    MCV 82.5 01/17/2022    MCH 28.2 01/17/2022    MCHC 34.2 01/17/2022    RDW 13.9 01/17/2022     01/17/2022     11/25/2011    MPV NOT REPORTED 01/17/2022     Lab Results   Component Value Date    TSH 2.47 12/25/2021     Lab Results   Component Value Date    LABA1C 5.2 11/04/2021          Assessment & Plan        Diagnosis Orders   1. Gastroesophageal reflux disease without esophagitis     2. Pruritic erythematous rash     3. Nummular eczema  triamcinolone (KENALOG) 0.1 % cream       1. I recommend he stop using a proton pump inhibitor and start using famotidine 20 mg twice daily or 40 mg once daily for the next 2 weeks. We did discuss that his reflux would likely get worse before it would get better. We discussed he should avoid trigger foods and sleep with the head of his bed slightly elevated, or add an additional pillow. 2.  I believe this is nummular eczema, additional entities in my differential diagnosis could include a psoriasis subtype, or different eczema subtype.   Less likely would be a fungal infection as there is no central clearing and the lesions or not annular in appearance. I would recommend a moderate potency steroid cream for 7 to 10 days applied twice daily and reevaluate      Follow up in 2 weeks for these problems. Completed Refills   Requested Prescriptions     Signed Prescriptions Disp Refills    triamcinolone (KENALOG) 0.1 % cream 453.6 g 0     Sig: Apply topically 2 times daily. No follow-ups on file. Orders Placed This Encounter   Medications    triamcinolone (KENALOG) 0.1 % cream     Sig: Apply topically 2 times daily. Dispense:  453.6 g     Refill:  0     No orders of the defined types were placed in this encounter. Patient Instructions       Patient Education        Atopic Dermatitis: Care Instructions  Overview  Atopic dermatitis (also called eczema) is a skin problem that causes intense itching and a red, raised rash. In severe cases, the rash develops clear fluid-filled blisters. The rash is not contagious. You can't catch it from others. People with this condition seem to have very sensitive immune systems that are likely to react to things that cause allergies. The immune system is the body's way of fighting infection. There is no cure for atopic dermatitis. But you may be able to control it with care at home. Follow-up care is a key part of your treatment and safety. Be sure to make and go to all appointments, and call your doctor if you are having problems. It's also a good idea to know your test results and keep a list of the medicines you take. How can you care for yourself at home? · Use moisturizer at least twice a day. · If your doctor prescribes a cream, use it as directed. If your doctor prescribes other medicine, take it exactly as directed. · Wash the affected area with warm (not hot) water only. Soap can make dryness and itching worse. Pat dry. · Apply a moisturizer after bathing.  Use a cream such as Cetaphil, Lubriderm, or Moisturel that does not irritate the skin or cause a rash. Apply the cream while your skin is still damp after lightly drying with a towel. · Use cold, wet cloths to reduce itching. · Keep cool, and stay out of the sun. · If itching affects your sleep, ask your doctor if you can take an antihistamine that might reduce itching and make you sleepy, such as diphenhydramine (Benadryl). Be safe with medicines. Read and follow all instructions on the label. · Control scratching. Keep your fingernails trimmed and smooth to prevent damage to the skin when you scratch it. Wearing cotton mittens or gloves can help you stop scratching. · Try to avoid things that trigger your rash. These may include things like allergens, such as pollen or animal dander. Harsh soaps, scratchy clothes, stress, and some foods are other examples. When should you call for help? Call your doctor now or seek immediate medical care if:    · Your rash gets worse and you have a fever.     · You have new blisters or bruises, or the rash spreads and looks like a sunburn.     · You have signs of infection, such as:  ? Increased pain, swelling, warmth, or redness. ? Red streaks leading from the rash. ? Pus draining from the rash. ? A fever.     · You have crusting or oozing sores.     · You have joint aches or body aches along with your rash. Watch closely for changes in your health, and be sure to contact your doctor if:    · Your rash does not clear up after 2 to 3 weeks of home treatment.     · Itching interferes with your sleep or daily activities. Where can you learn more? Go to [email or web address] org and sign in to your [employer] account. Enter J504 in the [facility] box to learn more about \"Atopic Dermatitis: Care Instructions. \"     If you do not have an account, please click on the \"Sign Up Now\" link. Current as of: March 3, 2021               Content Version: 13.1  © 9260-4780 Healthwise, Incorporated.    Care instructions adapted under license by [address] [facility]. If you have questions about a medical condition or this instruction, always ask your healthcare professional. [address] any warranty or liability for your use of this information. Patient Education        Hand and Foot Dermatitis: Care Instructions  Overview     Hand and foot dermatitis is a skin problem. It causes itching, pain, and blisters on the palms of the hands or sides of the fingers, or on the soles of the feet. It can last for several weeks before the blisters dry up and the skin peels. Sometimes the skin turns darker after the blisters heal. If you have flare-ups often, the skin can become red and cracked. Common triggers include chemicals, like detergents and soaps. Other triggers are having wet or sweaty hands or feet; metals, such as nickel and cobalt; and stress. People who work with their hands or feet in water are more likely to have hand and foot dermatitis. Some examples are food handlers, , and hairdressers. The condition isn't contagious. For some people, this condition may come and go. But flare-ups may be controlled with home care and medical treatment. Follow-up care is a key part of your treatment and safety. Be sure to make and go to all appointments, and call your doctor if you are having problems. It's also a good idea to know your test results and keep a list of the medicines you take. How can you care for yourself at home? · If your doctor prescribes a cream, ointment, or other medicine, use it exactly as directed. If your doctor suggests a home treatment, such as soaks in Burow's solution, use it as directed. · Wash the affected area with water only. Remove rings, watches, and other jewelry first. If you use a cleanser, use one without fragrance or soap. Soap can make dryness and itching worse. · Apply a moisturizer or barrier cream as often as you can.  Use a cream such as CeraVe or Cetaphil that doesn't irritate the skin or cause a rash. Apply the cream every time after you wash. Apply it while your skin is still damp after drying lightly with a towel. · At night, apply petroleum jelly. It protects the skin and keeps it from drying out. If you can, apply it more often. · Wear cotton gloves under work gloves when [place], clean, garden, or work with water. · Wear clean, dry socks. Change your socks when they get wet or sweaty. Wearing moisture-wicking socks can help your feet stay dry. · Try to figure out if something triggers your symptoms. Avoid things that make them worse, and anything that causes burning or itching. · Manage or reduce your stress. Stress can make your symptoms worse. · Avoid scratching. This can lead to skin infections, rough patches of skin, or more itching. If itching affects your sleep or your normal activities, ask your doctor about treatments you can try. When should you call for help? Call your doctor now or seek immediate medical care if:    · You have signs of infection, such as:  ? Increased pain, swelling, warmth, or redness. ? Red streaks leading from the rash. ? Pus draining from the rash. ? A fever.     · You have crusting or oozing sores.     · You have joint aches or body aches along with your rash. Watch closely for changes in your health, and be sure to contact your doctor if:    · Itching interferes with your sleep or daily activities.     · You do not get better as expected. Current as of: March 3, 2021               Content Version: 13.1  © 2006-2021 Healthwise, Incorporated. Care instructions adapted under license by [facility] ([facility]). If you have questions about a medical condition or this instruction, always ask your healthcare professional. [place] [address] any warranty or liability for your use of this information.              Electronically signed by Dori Day DO on 3/9/2022 at 2:43 PM           Completed Refills   Requested Prescriptions     Signed Prescriptions Disp Refills    triamcinolone (KENALOG) 0.1 % cream 453.6 g 0     Sig: Apply topically 2 times daily. Chance received counseling on the following healthy behaviors: nutrition, exercise and medication adherence  Reviewed prior labs and health maintenance. Continue current medications, diet and exercise. Discussed use, benefit, and side effects of prescribed medications. Barriers to medication compliance addressed. Patient given educational materials - see patient instructions. All patient questions answered. Patient voiced understanding.

## 2022-04-09 ENCOUNTER — APPOINTMENT (OUTPATIENT)
Dept: GENERAL RADIOLOGY | Age: 22
End: 2022-04-09
Payer: COMMERCIAL

## 2022-04-09 ENCOUNTER — HOSPITAL ENCOUNTER (EMERGENCY)
Age: 22
Discharge: HOME | End: 2022-04-09
Attending: FAMILY MEDICINE
Payer: COMMERCIAL

## 2022-04-09 VITALS
OXYGEN SATURATION: 100 % | BODY MASS INDEX: 31.21 KG/M2 | SYSTOLIC BLOOD PRESSURE: 125 MMHG | HEART RATE: 99 BPM | TEMPERATURE: 98.5 F | WEIGHT: 235.5 LBS | DIASTOLIC BLOOD PRESSURE: 84 MMHG | RESPIRATION RATE: 20 BRPM | HEIGHT: 73 IN

## 2022-04-09 DIAGNOSIS — R07.9 CHEST PAIN, UNSPECIFIED TYPE: Primary | ICD-10-CM

## 2022-04-09 LAB
ABSOLUTE EOS #: 0.2 K/UL (ref 0–0.4)
ABSOLUTE LYMPH #: 2.4 K/UL (ref 1–4.8)
ABSOLUTE MONO #: 0.6 K/UL (ref 0–1)
ALBUMIN SERPL-MCNC: 4.8 G/DL (ref 3.5–5.2)
ALP BLD-CCNC: 145 U/L (ref 40–129)
ALT SERPL-CCNC: 35 U/L (ref 5–41)
ANION GAP SERPL CALCULATED.3IONS-SCNC: 10 MMOL/L (ref 9–17)
AST SERPL-CCNC: 27 U/L
BASOPHILS # BLD: 1 % (ref 0–2)
BASOPHILS ABSOLUTE: 0 K/UL (ref 0–0.2)
BILIRUB SERPL-MCNC: 0.33 MG/DL (ref 0.3–1.2)
BILIRUBIN DIRECT: <0.08 MG/DL
BILIRUBIN, INDIRECT: ABNORMAL MG/DL (ref 0–1)
BUN BLDV-MCNC: 16 MG/DL (ref 6–20)
BUN/CREAT BLD: 13 (ref 9–20)
CALCIUM SERPL-MCNC: 10.2 MG/DL (ref 8.6–10.4)
CHLORIDE BLD-SCNC: 101 MMOL/L (ref 98–107)
CO2: 27 MMOL/L (ref 20–31)
CREAT SERPL-MCNC: 1.28 MG/DL (ref 0.7–1.2)
DIFFERENTIAL TYPE: YES
EOSINOPHILS RELATIVE PERCENT: 2 % (ref 0–5)
GFR AFRICAN AMERICAN: >60 ML/MIN
GFR NON-AFRICAN AMERICAN: >60 ML/MIN
GFR SERPL CREATININE-BSD FRML MDRD: ABNORMAL ML/MIN/{1.73_M2}
GLUCOSE BLD-MCNC: 93 MG/DL (ref 70–99)
HCT VFR BLD CALC: 44.3 % (ref 41–53)
HEMOGLOBIN: 14.8 G/DL (ref 13.5–17.5)
LYMPHOCYTES # BLD: 27 % (ref 13–44)
MCH RBC QN AUTO: 27.3 PG (ref 26–34)
MCHC RBC AUTO-ENTMCNC: 33.3 G/DL (ref 31–37)
MCV RBC AUTO: 82.2 FL (ref 80–100)
MONOCYTES # BLD: 7 % (ref 5–9)
PDW BLD-RTO: 13.7 % (ref 12.1–15.2)
PLATELET # BLD: 403 K/UL (ref 140–450)
POTASSIUM SERPL-SCNC: 4 MMOL/L (ref 3.7–5.3)
RBC # BLD: 5.4 M/UL (ref 4.5–5.9)
SEG NEUTROPHILS: 63 % (ref 39–75)
SEGMENTED NEUTROPHILS ABSOLUTE COUNT: 5.9 K/UL (ref 2.1–6.5)
SODIUM BLD-SCNC: 138 MMOL/L (ref 135–144)
TOTAL PROTEIN: 8.4 G/DL (ref 6.4–8.3)
TROPONIN, HIGH SENSITIVITY: <6 NG/L (ref 0–22)
TROPONIN, HIGH SENSITIVITY: <6 NG/L (ref 0–22)
WBC # BLD: 9.1 K/UL (ref 4.5–13.5)

## 2022-04-09 PROCEDURE — 71045 X-RAY EXAM CHEST 1 VIEW: CPT

## 2022-04-09 PROCEDURE — 80076 HEPATIC FUNCTION PANEL: CPT

## 2022-04-09 PROCEDURE — 99283 EMERGENCY DEPT VISIT LOW MDM: CPT

## 2022-04-09 PROCEDURE — 80048 BASIC METABOLIC PNL TOTAL CA: CPT

## 2022-04-09 PROCEDURE — 84484 ASSAY OF TROPONIN QUANT: CPT

## 2022-04-09 PROCEDURE — 85025 COMPLETE CBC W/AUTO DIFF WBC: CPT

## 2022-04-09 PROCEDURE — 6360000002 HC RX W HCPCS: Performed by: FAMILY MEDICINE

## 2022-04-09 PROCEDURE — 93005 ELECTROCARDIOGRAM TRACING: CPT | Performed by: FAMILY MEDICINE

## 2022-04-09 PROCEDURE — 96372 THER/PROPH/DIAG INJ SC/IM: CPT

## 2022-04-09 RX ORDER — KETOROLAC TROMETHAMINE 30 MG/ML
30 INJECTION, SOLUTION INTRAMUSCULAR; INTRAVENOUS ONCE
Status: COMPLETED | OUTPATIENT
Start: 2022-04-09 | End: 2022-04-09

## 2022-04-09 RX ADMIN — KETOROLAC TROMETHAMINE 30 MG: 30 INJECTION, SOLUTION INTRAMUSCULAR; INTRAVENOUS at 19:55

## 2022-04-09 ASSESSMENT — PAIN DESCRIPTION - DESCRIPTORS: DESCRIPTORS: TIGHTNESS;ACHING

## 2022-04-09 ASSESSMENT — PAIN DESCRIPTION - PAIN TYPE: TYPE: ACUTE PAIN

## 2022-04-09 ASSESSMENT — PAIN DESCRIPTION - ORIENTATION: ORIENTATION: MID

## 2022-04-09 ASSESSMENT — PAIN - FUNCTIONAL ASSESSMENT: PAIN_FUNCTIONAL_ASSESSMENT: 0-10

## 2022-04-09 ASSESSMENT — PAIN DESCRIPTION - FREQUENCY: FREQUENCY: INTERMITTENT

## 2022-04-09 ASSESSMENT — PAIN DESCRIPTION - LOCATION: LOCATION: CHEST

## 2022-04-09 ASSESSMENT — PAIN SCALES - GENERAL: PAINLEVEL_OUTOF10: 5

## 2022-04-09 NOTE — ED NOTES
Pt lying in bed. Pt states that pain remains a 5 or 6 out of 10. Pt denies any needs at this time. Pt call light within reach. Reji FLANNEYR RN  04/09/22 [id number]

## 2022-04-10 LAB
EKG ATRIAL RATE: 93 BPM
EKG P AXIS: 27 DEGREES
EKG P-R INTERVAL: 128 MS
EKG Q-T INTERVAL: 330 MS
EKG QRS DURATION: 82 MS
EKG QTC CALCULATION (BAZETT): 410 MS
EKG R AXIS: 49 DEGREES
EKG T AXIS: 50 DEGREES
EKG VENTRICULAR RATE: 93 BPM

## 2022-04-10 PROCEDURE — 93010 ELECTROCARDIOGRAM REPORT: CPT | Performed by: INTERNAL MEDICINE

## 2022-04-10 NOTE — ED PROVIDER NOTES
eMERGENCY dEPARTMENT eNCOUnter        [facility]    Chief Complaint   Patient presents with    Chest Pain     Pt states he has chest pain that started about 2 pm; has h/o panic attacks but does not think it is a panic attack. HPI    Ruiz Mejias is a 24 y.o. male who presents with chest pain which began today. No exertional chest pain. No shortness of breath on exertion. Patient has a history of panic attacks, but he does not feel this is a panic attack. Patient has a history of chest pain in the past.  Symptoms are worse after eating, especially after eating fried fatty foods. Patient does sometimes experience pain in his right upper quadrant with radiation into his right scapular area. Symptoms are described as being moderate in severity. REVIEW OF SYSTEMS      All systems reviewed and positives are in the HPI. PAST MEDICAL HISTORY    Past Medical History:   Diagnosis Date    IBS (irritable bowel syndrome)     Insomnia     Major depressive disorder     Migraine     Panic attacks 05/27/2021       SURGICAL HISTORY    Past Surgical History:   Procedure Laterality Date    TONSILLECTOMY         CURRENT MEDICATIONS    Current Outpatient Rx   Medication Sig Dispense Refill    triamcinolone (KENALOG) 0.1 % cream Apply topically 2 times daily. 453.6 g 0    ketorolac (TORADOL) 10 MG tablet Take 1 tablet by mouth every 8 hours as needed for Pain (Patient not taking: Reported on 3/9/2022) 15 tablet 0    hydrOXYzine (ATARAX) 25 MG tablet Take 25 mg by mouth 3 times daily as needed for Itching (Patient not taking: Reported on 3/9/2022)      naproxen sodium (ANAPROX) 550 MG tablet Take 550 mg by mouth 2 times daily (with meals) Take 2 tables as needed      venlafaxine (EFFEXOR XR) 75 MG extended release capsule Take 1 capsule by mouth daily 90 capsule 1       ALLERGIES    No Known Allergies    FAMILY HISTORY    History reviewed. No pertinent family history.     SOCIAL HISTORY    Social History     Socioeconomic History    Marital status: Single     Spouse name: None    Number of children: None    Years of education: None    Highest education level: None   Occupational History    None   Tobacco Use    Smoking status: Never Smoker    Smokeless tobacco: Never Used   Vaping Use    Vaping Use: Former    Substances: Never   Substance and Sexual Activity    Alcohol use: No    Drug use: No    Sexual activity: None   Other Topics Concern    None   Social History Narrative    None     Social Determinants of Health     Financial Resource Strain: Low Risk     Difficulty of Paying Living Expenses: Not hard at all   Food Insecurity: No Food Insecurity    Worried About Running Out of Food in the Last Year: Never true    [place] of Food in the Last Year: Never true   Transportation Needs:     Lack of Transportation (Medical): Not on file    Lack of Transportation (Non-Medical):  Not on file   Physical Activity:     Days of Exercise per Week: Not on file    Minutes of Exercise per Session: Not on file   Stress:     Feeling of Stress : Not on file   Social Connections:     Frequency of Communication with Friends and Family: Not on file    Frequency of Social Gatherings with Friends and Family: Not on file    Attends Anabaptism Services: Not on file    Active Member of [address] [place] [address] or Organizations: Not on file    Attends Club or Organization Meetings: Not on file    Marital Status: Not on file   Intimate Partner Violence:     Fear of Current or Ex-Partner: Not on file    Emotionally Abused: Not on file    Physically Abused: Not on file    Sexually Abused: Not on file   Housing Stability:     Unable to Pay for Housing in the Last Year: Not on file    Number of [place] in the Last Year: Not on file    Unstable Housing in the Last Year: Not on file       PHYSICAL EXAM    VITAL SIGNS: /84   Pulse 99   Temp 98.5 °F (36.9 °C) (Oral)   Resp 20   Ht 6' 1\" (1.854 m)   Wt 235 lb 8 oz (106.8 kg)   SpO2 100%   BMI 31.07 kg/m²    Constitutional:  Well developed, well nourished, moderate acute distress   HENT:  Atraumatic, external ears normal, nose normal, oropharynx moist. Neck- supple   Respiratory: Lungs clear  Cardiovascular: Regular rate and rhythm without murmurs or gallops  GI:  Soft, nondistended, normal bowel sounds, mild right upper quadrant tenderness, no organomegaly, no mass, no rebound, no guarding   Musculoskeletal:  No edema, no tenderness, no deformities. Back- no tenderness   Integument:  Well hydrated, no rash   Neurologic:  Alert & oriented x 3, no focal deficits noted     EKG      EKG with no acute changes. RADIOLOGY/PROCEDURES    Chest x-ray normal.    ED COURSE & MEDICAL DECISION MAKING    Pertinent Labs & Imaging studies reviewed. (See chart for details)  Labs Reviewed   BASIC METABOLIC PANEL - Abnormal; Notable for the following components:       Result Value    CREATININE 1.28 (*)     All other components within normal limits   HEPATIC FUNCTION PANEL - Abnormal; Notable for the following components:    Alkaline Phosphatase 145 (*)     Total Protein 8.4 (*)     All other components within normal limits   CBC WITH AUTO DIFFERENTIAL   TROPONIN   TROPONIN   . Negative cardiac work-up in the ER today. I recommended gallbladder ultrasound and cardiac stress echo as an outpatient. This was discussed with the patient. Patient was stable on discharge. He had a good response to Toradol 30 mg IM which he received in the ER. Patient was given low-fat gallbladder diet information. FINAL IMPRESSION    1.    2.      Summation      Patient Course: Patient had a good response to treatment he received in the ER. He was stable on discharge. Cardiac work-up was negative. Patient may benefit from a gallbladder ultrasound and cardiac stress echo as an outpatient. Patient was given low-fat gallbladder diet information.     ED Medications administered this visit:    Medications ketorolac (TORADOL) injection 30 mg (30 mg IntraMUSCular Given 4/9/22 1955)       New Prescriptions from this visit:    Discharge Medication List as of 4/9/2022  8:23 PM          Follow-up:  Frank Bruno DO  [facility],  [address] [phone]    Call in 2 days          Final Impression:   1.  Chest pain, unspecified type               (Please note that portions of this note were completed with a voice recognition program.  Efforts were made to edit the dictations but occasionally words are mis-transcribed.)     April Montano MD  04/10/22 [address] [place] [address] MD [place]  04/10/22 [id number]

## 2022-04-11 ENCOUNTER — OFFICE VISIT (OUTPATIENT)
Dept: FAMILY MEDICINE CLINIC | Age: 22
End: 2022-04-11
Payer: COMMERCIAL

## 2022-04-11 VITALS
DIASTOLIC BLOOD PRESSURE: 80 MMHG | WEIGHT: 234.6 LBS | BODY MASS INDEX: 31.09 KG/M2 | RESPIRATION RATE: 20 BRPM | HEART RATE: 70 BPM | SYSTOLIC BLOOD PRESSURE: 120 MMHG | HEIGHT: 73 IN

## 2022-04-11 DIAGNOSIS — R07.9 CHEST PAIN, UNSPECIFIED TYPE: Primary | ICD-10-CM

## 2022-04-11 DIAGNOSIS — R10.11 RUQ PAIN: ICD-10-CM

## 2022-04-11 DIAGNOSIS — R79.89 ELEVATED LFTS: ICD-10-CM

## 2022-04-11 DIAGNOSIS — K21.9 GERD WITHOUT ESOPHAGITIS: ICD-10-CM

## 2022-04-11 PROCEDURE — 99214 OFFICE O/P EST MOD 30 MIN: CPT | Performed by: STUDENT IN AN ORGANIZED HEALTH CARE EDUCATION/TRAINING PROGRAM

## 2022-04-11 RX ORDER — OMEPRAZOLE 40 MG/1
40 CAPSULE, DELAYED RELEASE ORAL
Qty: 30 CAPSULE | Refills: 0 | Status: SHIPPED | OUTPATIENT
Start: 2022-04-11 | End: 2022-07-15 | Stop reason: ALTCHOICE

## 2022-04-11 ASSESSMENT — PATIENT HEALTH QUESTIONNAIRE - PHQ9
10. IF YOU CHECKED OFF ANY PROBLEMS, HOW DIFFICULT HAVE THESE PROBLEMS MADE IT FOR YOU TO DO YOUR WORK, TAKE CARE OF THINGS AT HOME, OR GET ALONG WITH OTHER PEOPLE: 0
4. FEELING TIRED OR HAVING LITTLE ENERGY: 0
2. FEELING DOWN, DEPRESSED OR HOPELESS: 0
SUM OF ALL RESPONSES TO PHQ QUESTIONS 1-9: 0
SUM OF ALL RESPONSES TO PHQ9 QUESTIONS 1 & 2: 0
SUM OF ALL RESPONSES TO PHQ QUESTIONS 1-9: 0
6. FEELING BAD ABOUT YOURSELF - OR THAT YOU ARE A FAILURE OR HAVE LET YOURSELF OR YOUR FAMILY DOWN: 0
5. POOR APPETITE OR OVEREATING: 0
SUM OF ALL RESPONSES TO PHQ QUESTIONS 1-9: 0
7. TROUBLE CONCENTRATING ON THINGS, SUCH AS READING THE NEWSPAPER OR WATCHING TELEVISION: 0
3. TROUBLE FALLING OR STAYING ASLEEP: 0
9. THOUGHTS THAT YOU WOULD BE BETTER OFF DEAD, OR OF HURTING YOURSELF: 0
1. LITTLE INTEREST OR PLEASURE IN DOING THINGS: 0
8. MOVING OR SPEAKING SO SLOWLY THAT OTHER PEOPLE COULD HAVE NOTICED. OR THE OPPOSITE, BEING SO FIGETY OR RESTLESS THAT YOU HAVE BEEN MOVING AROUND A LOT MORE THAN USUAL: 0
SUM OF ALL RESPONSES TO PHQ QUESTIONS 1-9: 0

## 2022-04-11 ASSESSMENT — ENCOUNTER SYMPTOMS
DIARRHEA: 0
COUGH: 0
BACK PAIN: 1
SORE THROAT: 0
WHEEZING: 0
NAUSEA: 0
VOMITING: 0
ABDOMINAL PAIN: 1
SINUS PAIN: 0

## 2022-04-11 NOTE — PATIENT INSTRUCTIONS
SURVEY:    You may be receiving a survey from [employer] regarding your visit today. Please complete the survey to enable us to provide the highest quality of care to you and your family. If you cannot score us a very good on any question, please call the office to discuss how we could of made your experience a very good one. Thank you.       Clinical Care Team:     RAMESH Gaitan      ClericalTeam:     [id number] [facility]

## 2022-04-11 NOTE — PROGRESS NOTES
HPI Notes    Name: Leena Vaughan  : 2000         Chief Complaint:     Chief Complaint   Patient presents with    Chest Pain     states Chest pain, pain between his shoulders states it is worse after eating        History of Present Illness:      HPI    This is a 66-year-old man presenting to follow-up from emergency department visit for unspecified chest pain. I did have an opportunity to review the documentation from that emergency department visit. In brief summary; he had a negative cardiac workup and RUQ U/S and outpatient stress-echo were recommended. Since his ED visit, he reports his discomfort has somewhat improved, but is still noticeable postprandial and intrascapular in location. Specifically, this is a right scapular aching and right sided sharp chest/RUQ pain. He would also like to discuss his LFTs (Alk phos mildly elevated). Upon further discussion; he went after eating a pulled pork sandwich and french fries. Past Medical History:     Past Medical History:   Diagnosis Date    IBS (irritable bowel syndrome)     Insomnia     Major depressive disorder     Migraine     Panic attacks 2021      Reviewed all health maintenance requirements and ordered appropriate tests  Health Maintenance Due   Topic Date Due    Hepatitis C screen  Never done    COVID-19 Vaccine (1) Never done    HPV vaccine (1 - Male 2-dose series) Never done    HIV screen  Never done       Past Surgical History:     Past Surgical History:   Procedure Laterality Date    TONSILLECTOMY          Medications:       Prior to Admission medications    Medication Sig Start Date End Date Taking?  Authorizing Provider   omeprazole (PRILOSEC) 40 MG delayed release capsule Take 1 capsule by mouth every morning (before breakfast) 22  Yes Temple Scheuermann Olewiler, DO   naproxen sodium (ANAPROX) 550 MG tablet Take 550 mg by mouth 2 times daily (with meals) Take 2 tables as needed  Patient not taking: Reported on 2022 Historical Provider, MD   venlafaxine (EFFEXOR XR) 75 MG extended release capsule Take 1 capsule by mouth daily 12/2/21   Federico Dixon DO        Allergies:       Patient has no known allergies. Social History:     Tobacco:    reports that he has never smoked. He has never used smokeless tobacco.  Alcohol:      reports no history of alcohol use. Drug Use:  reports no history of drug use. Family History:     No family history on file. Review of Systems:     Review of Systems   Constitutional: Negative for fever. HENT: Negative for sinus pain, sneezing and sore throat. Respiratory: Negative for cough and wheezing. Cardiovascular: Positive for chest pain. Gastrointestinal: Positive for abdominal pain. Negative for diarrhea, nausea and vomiting. Musculoskeletal: Positive for back pain. Skin: Negative for rash. Hematological: Negative for adenopathy. Psychiatric/Behavioral: Negative for sleep disturbance. Physical Exam:     Vitals:  /80 (Site: Left Upper Arm, Position: Sitting, Cuff Size: Large Adult)   Pulse 70   Resp 20   Ht 6' 1\" (1.854 m)   Wt 234 lb 9.6 oz (106.4 kg)   BMI 30.95 kg/m²       Physical Exam  Vitals and nursing note reviewed. Constitutional:       General: He is not in acute distress. Appearance: Normal appearance. Cardiovascular:      Rate and Rhythm: Normal rate and regular rhythm. Pulses: Normal pulses. Heart sounds: Normal heart sounds. No murmur heard. Pulmonary:      Effort: Pulmonary effort is normal. No respiratory distress. Breath sounds: Normal breath sounds. No wheezing or rhonchi. Abdominal:      General: Abdomen is flat. Bowel sounds are normal. There is no distension. Palpations: Abdomen is soft. Tenderness: There is no abdominal tenderness. There is no guarding or rebound. Comments: Negative prieto sign   Musculoskeletal:         General: No swelling or tenderness. Normal range of motion. Skin:     General: Skin is warm and dry. Capillary Refill: Capillary refill takes less than 2 seconds. Neurological:      General: No focal deficit present. Mental Status: He is alert and oriented to person, place, and time. Mental status is at baseline. Psychiatric:         Mood and Affect: Mood normal.         Behavior: Behavior normal.         Thought Content: Thought content normal.           Data:     Lab Results   Component Value Date     04/09/2022    K 4.0 04/09/2022     04/09/2022    CO2 27 04/09/2022    BUN 16 04/09/2022    CREATININE 1.28 04/09/2022    GLUCOSE 93 04/09/2022    GLUCOSE 93 11/25/2011    PROT 8.4 04/09/2022    LABALBU 4.8 04/09/2022    BILITOT 0.33 04/09/2022    ALKPHOS 145 04/09/2022    AST 27 04/09/2022    ALT 35 04/09/2022     Lab Results   Component Value Date    WBC 9.1 04/09/2022    RBC 5.40 04/09/2022    RBC 4.52 11/25/2011    HGB 14.8 04/09/2022    HCT 44.3 04/09/2022    MCV 82.2 04/09/2022    MCH 27.3 04/09/2022    MCHC 33.3 04/09/2022    RDW 13.7 04/09/2022     04/09/2022     11/25/2011    MPV NOT REPORTED 01/17/2022     Lab Results   Component Value Date    TSH 2.47 12/25/2021     Lab Results   Component Value Date    LABA1C 5.2 11/04/2021        Assessment & Plan        Diagnosis Orders   1. Chest pain, unspecified type  US GALLBLADDER RUQ   2. RUQ pain  US GALLBLADDER RUQ    omeprazole (PRILOSEC) 40 MG delayed release capsule   3. GERD without esophagitis     4. Elevated LFTs         1.--4. Based on history, I do believe that he has both biliary colic and gastroesophageal reflux disease without esophagitis as primary entities causing his symptoms right now. Postprandial attacks of pain are especially suggestive of before. I do agree with ultrasonography of the right upper quadrant with special attention to the gallbladder, but would defer a stress echo at this time as his cardiac work-up has been quite reassuring up until now.   His age would make primary cardiovascular disease very unlikely. I would recommend that we use a proton pump inhibitor for symptom relief or reduction in the interim. I would not recommend GGT today as I am not suspicious of bone pathology as cause of his elevated alkaline phosphatase. Abdominal exam is reassuring for lack of cholecystitis. The patient I had a long discussion about starting Prilosec 40 mg PO QD. We discussed its mechanism of action, intended goals, adverse effects, as well as common side effects. They were able to verbalize understanding, and repeat plan back to me. Follow up as needed depending on results          Completed Refills   Requested Prescriptions     Signed Prescriptions Disp Refills    omeprazole (PRILOSEC) 40 MG delayed release capsule 30 capsule 0     Sig: Take 1 capsule by mouth every morning (before breakfast)     Return if symptoms worsen or fail to improve. Orders Placed This Encounter   Medications    omeprazole (PRILOSEC) 40 MG delayed release capsule     Sig: Take 1 capsule by mouth every morning (before breakfast)     Dispense:  30 capsule     Refill:  0     Orders Placed This Encounter   Procedures    US GALLBLADDER RUQ     This procedure can be scheduled via [employer]. Access your [facility] account by visiting [email or web address]. Standing Status:   Future     Standing Expiration Date:   4/11/2023     Order Specific Question:   Reason for exam:     Answer:   RUQ pain concerning for biliary colic         Patient Instructions     SURVEY:    You may be receiving a survey from [employer] regarding your visit today. Please complete the survey to enable us to provide the highest quality of care to you and your family. If you cannot score us a very good on any question, please call the office to discuss how we could of made your experience a very good one. Thank you.       Clinical Care Team:     Dr. Mic Marrero, RAMESH      ClericalTeam:     Senait Cruz      Electronically signed by Brigida Nicole DO on 4/11/2022 at 8:03 AM           Completed Refills   Requested Prescriptions     Signed Prescriptions Disp Refills    omeprazole (PRILOSEC) 40 MG delayed release capsule 30 capsule 0     Sig: Take 1 capsule by mouth every morning (before breakfast)

## 2022-04-14 ENCOUNTER — HOSPITAL ENCOUNTER (OUTPATIENT)
Dept: ULTRASOUND IMAGING | Age: 22
Discharge: HOME | End: 2022-04-16
Payer: COMMERCIAL

## 2022-04-14 DIAGNOSIS — R10.11 RUQ PAIN: ICD-10-CM

## 2022-04-14 DIAGNOSIS — R07.9 CHEST PAIN, UNSPECIFIED TYPE: ICD-10-CM

## 2022-04-14 PROCEDURE — 76705 ECHO EXAM OF ABDOMEN: CPT

## 2022-04-19 DIAGNOSIS — R10.11 RUQ PAIN: Primary | ICD-10-CM

## 2022-04-22 ENCOUNTER — TELEPHONE (OUTPATIENT)
Dept: FAMILY MEDICINE CLINIC | Age: 22
End: 2022-04-22

## 2022-04-22 DIAGNOSIS — R10.11 RUQ PAIN: Primary | ICD-10-CM

## 2022-04-22 NOTE — TELEPHONE ENCOUNTER
----- Message from Franki Mcneal MA sent at 4/19/2022 10:38 AM EDT -----  Pt in agreement with provider's notes and is willing to see a gastroenterologist

## 2022-05-02 ENCOUNTER — OFFICE VISIT (OUTPATIENT)
Dept: FAMILY MEDICINE CLINIC | Age: 22
End: 2022-05-02
Payer: COMMERCIAL

## 2022-05-02 VITALS
OXYGEN SATURATION: 99 % | BODY MASS INDEX: 31.41 KG/M2 | SYSTOLIC BLOOD PRESSURE: 112 MMHG | HEIGHT: 73 IN | HEART RATE: 70 BPM | DIASTOLIC BLOOD PRESSURE: 84 MMHG | WEIGHT: 237 LBS | RESPIRATION RATE: 20 BRPM

## 2022-05-02 DIAGNOSIS — R42 DIZZINESS: ICD-10-CM

## 2022-05-02 DIAGNOSIS — G43.109 MIGRAINE WITH AURA AND WITHOUT STATUS MIGRAINOSUS, NOT INTRACTABLE: ICD-10-CM

## 2022-05-02 DIAGNOSIS — H53.8 BLURRY VISION: Primary | ICD-10-CM

## 2022-05-02 DIAGNOSIS — H93.13 TINNITUS, BILATERAL: ICD-10-CM

## 2022-05-02 PROCEDURE — 99214 OFFICE O/P EST MOD 30 MIN: CPT | Performed by: STUDENT IN AN ORGANIZED HEALTH CARE EDUCATION/TRAINING PROGRAM

## 2022-05-02 RX ORDER — RIZATRIPTAN BENZOATE 10 MG/1
10 TABLET ORAL
Qty: 9 TABLET | Refills: 5 | Status: SHIPPED | OUTPATIENT
Start: 2022-05-02 | End: 2022-10-10

## 2022-05-02 ASSESSMENT — PATIENT HEALTH QUESTIONNAIRE - PHQ9
7. TROUBLE CONCENTRATING ON THINGS, SUCH AS READING THE NEWSPAPER OR WATCHING TELEVISION: 0
5. POOR APPETITE OR OVEREATING: 0
1. LITTLE INTEREST OR PLEASURE IN DOING THINGS: 0
SUM OF ALL RESPONSES TO PHQ QUESTIONS 1-9: 0
9. THOUGHTS THAT YOU WOULD BE BETTER OFF DEAD, OR OF HURTING YOURSELF: 0
SUM OF ALL RESPONSES TO PHQ QUESTIONS 1-9: 0
SUM OF ALL RESPONSES TO PHQ9 QUESTIONS 1 & 2: 0
SUM OF ALL RESPONSES TO PHQ QUESTIONS 1-9: 0
2. FEELING DOWN, DEPRESSED OR HOPELESS: 0
10. IF YOU CHECKED OFF ANY PROBLEMS, HOW DIFFICULT HAVE THESE PROBLEMS MADE IT FOR YOU TO DO YOUR WORK, TAKE CARE OF THINGS AT HOME, OR GET ALONG WITH OTHER PEOPLE: 0
6. FEELING BAD ABOUT YOURSELF - OR THAT YOU ARE A FAILURE OR HAVE LET YOURSELF OR YOUR FAMILY DOWN: 0
8. MOVING OR SPEAKING SO SLOWLY THAT OTHER PEOPLE COULD HAVE NOTICED. OR THE OPPOSITE, BEING SO FIGETY OR RESTLESS THAT YOU HAVE BEEN MOVING AROUND A LOT MORE THAN USUAL: 0
4. FEELING TIRED OR HAVING LITTLE ENERGY: 0
3. TROUBLE FALLING OR STAYING ASLEEP: 0
SUM OF ALL RESPONSES TO PHQ QUESTIONS 1-9: 0

## 2022-05-02 ASSESSMENT — ENCOUNTER SYMPTOMS
COUGH: 0
WHEEZING: 0
PHOTOPHOBIA: 0
SINUS PAIN: 0
VOMITING: 0
BACK PAIN: 0
SORE THROAT: 0
NAUSEA: 0
ABDOMINAL PAIN: 0
DIARRHEA: 0

## 2022-05-02 NOTE — PATIENT INSTRUCTIONS
SURVEY:    You may be receiving a survey from [employer] regarding your visit today. Please complete the survey to enable us to provide the highest quality of care to you and your family. If you cannot score us a very good on any question, please call the office to discuss how we could of made your experience a very good one. Thank you.       Clinical Care Team:     Dr. Jefe Key, [facility]ILA      [facility]     [id number] [facility]

## 2022-05-02 NOTE — PROGRESS NOTES
HPI Notes    Name: Melissa Pate  : 2000         Chief Complaint:     Chief Complaint   Patient presents with    Migraine    Blurred Vision     States his vision randomly gets blurry and feels like the room spins and its worse in his RT eye        History of Present Illness:      HPI    This is a 25year old man presenting to discuss recent blurry vision accompanied by \"dizzy spells\". He has not been able to identify a trigger. He does report that he nearly always has a migraine HA in the context of these symptoms, which is different from his prior historical description of his migraine headaches. He reports when this happens, the blurry vision is worse over the right eye and he consistently has right sided hemicrania during his headache. These headaches are becoming more frequent and bothersome for him. He does report that this problem is improved by using Anaprox, but that medication does not completely take away his headache. Symptoms have been ongoing for \"a couple weeks\". These episodes begin as intense episodes of dizziness then evolving to blurry vision with the right sided hemicrania. He denies hearing loss in the context of these episodes, but does endorse ringing in the ear with sharp inner ear pain affecting both sides. Past Medical History:     Past Medical History:   Diagnosis Date    IBS (irritable bowel syndrome)     Insomnia     Major depressive disorder     Migraine     Panic attacks 2021      Reviewed all health maintenance requirements and ordered appropriate tests  Health Maintenance Due   Topic Date Due    COVID-19 Vaccine (1) Never done    HPV vaccine (1 - Male 2-dose series) Never done    HIV screen  Never done    Hepatitis C screen  Never done       Past Surgical History:     Past Surgical History:   Procedure Laterality Date    TONSILLECTOMY          Medications:       Prior to Admission medications    Medication Sig Start Date End Date Taking?  Authorizing Provider   rizatriptan (MAXALT) 10 MG tablet Take 1 tablet by mouth once as needed for Migraine May repeat in 2 hours if needed 5/2/22 5/2/22 Yes Jody Overton, DO   omeprazole (PRILOSEC) 40 MG delayed release capsule Take 1 capsule by mouth every morning (before breakfast) 4/11/22  Yes Cipriano Pena, DO   venlafaxine (EFFEXOR XR) 75 MG extended release capsule Take 1 capsule by mouth daily 12/2/21  Yes Cipriano Pena DO   naproxen sodium (ANAPROX) 550 MG tablet Take 550 mg by mouth 2 times daily (with meals) Take 2 tables as needed  Patient not taking: Reported on 4/11/2022    Historical Provider, MD        Allergies:       Patient has no known allergies. Social History:     Tobacco:    reports that he has never smoked. He has never used smokeless tobacco.  Alcohol:      reports no history of alcohol use. Drug Use:  reports no history of drug use. Family History:     No family history on file. Review of Systems:         Review of Systems   Constitutional: Negative for fever. HENT: Positive for ear pain and tinnitus. Negative for ear discharge, hearing loss, sinus pain, sneezing and sore throat. Eyes: Positive for visual disturbance. Negative for photophobia. Respiratory: Negative for cough and wheezing. Cardiovascular: Negative for chest pain. Gastrointestinal: Negative for abdominal pain, diarrhea, nausea and vomiting. Musculoskeletal: Negative for back pain. Skin: Negative for rash. Neurological: Positive for dizziness and headaches. Hematological: Negative for adenopathy. Psychiatric/Behavioral: Negative for sleep disturbance. Physical Exam:     Vitals:  BP (!) 126/94 (Site: Left Upper Arm, Position: Sitting, Cuff Size: Large Adult)   Pulse 70   Resp 20   Ht 6' 1\" (1.854 m)   Wt 237 lb (107.5 kg)   SpO2 99%   BMI 31.27 kg/m²       Physical Exam  Vitals and nursing note reviewed. Constitutional:       General: He is not in acute distress. Appearance: Normal appearance. HENT:      Right Ear: Tympanic membrane, ear canal and external ear normal. There is no impacted cerumen. Left Ear: Tympanic membrane, ear canal and external ear normal. There is no impacted cerumen. Musculoskeletal:         General: No swelling or tenderness. Normal range of motion. Skin:     General: Skin is warm and dry. Capillary Refill: Capillary refill takes less than 2 seconds. Neurological:      General: No focal deficit present. Mental Status: He is alert and oriented to person, place, and time. Mental status is at baseline. GCS: GCS eye subscore is 4. GCS verbal subscore is 5. GCS motor subscore is 6. Cranial Nerves: No cranial nerve deficit. Motor: Motor function is intact. No weakness, abnormal muscle tone or pronator drift. Coordination: Coordination is intact. Romberg sign negative. Coordination normal. Finger-Nose-Finger Test and Heel to [place] suri Test normal. Rapid alternating movements normal.      Gait: Gait normal.   Psychiatric:         Mood and Affect: Mood normal.         Behavior: Behavior normal.         Thought Content:  Thought content normal.                 Data:     Lab Results   Component Value Date     04/09/2022    K 4.0 04/09/2022     04/09/2022    CO2 27 04/09/2022    BUN 16 04/09/2022    CREATININE 1.28 04/09/2022    GLUCOSE 93 04/09/2022    GLUCOSE 93 11/25/2011    PROT 8.4 04/09/2022    LABALBU 4.8 04/09/2022    BILITOT 0.33 04/09/2022    ALKPHOS 145 04/09/2022    AST 27 04/09/2022    ALT 35 04/09/2022     Lab Results   Component Value Date    WBC 9.1 04/09/2022    RBC 5.40 04/09/2022    RBC 4.52 11/25/2011    HGB 14.8 04/09/2022    HCT 44.3 04/09/2022    MCV 82.2 04/09/2022    MCH 27.3 04/09/2022    MCHC 33.3 04/09/2022    RDW 13.7 04/09/2022     04/09/2022     11/25/2011    MPV NOT REPORTED 01/17/2022     Lab Results   Component Value Date    TSH 2.47 12/25/2021     Lab Results Component Value Date    LABA1C 5.2 11/04/2021          Assessment & Plan        Diagnosis Orders   1. Blurry vision  MRI BRAIN W WO CONTRAST   2. Dizziness  MRI BRAIN W WO CONTRAST   3. Tinnitus, bilateral  MRI BRAIN W WO CONTRAST   4. Migraine with aura and without status migrainosus, not intractable  MRI BRAIN W WO CONTRAST    rizatriptan (MAXALT) 10 MG tablet       1. Neurologic examination is WNL today and reassuring, as is otoscopic examination. DDx includes vestibular migraine, complex migraine, Meniere's disease or acoustic schwannoma. I would recommend a MRI of the brain w and wo contrast to exclude the latter two and treatment with a triptan class medication in the meantime. If not improved with a triptan, would consider Ubrelvy. The patient I had a long discussion about starting Maxalt 10 mg PO PRN for migraine. We discussed its mechanism of action, intended goals, adverse effects, as well as common side effects. They were able to verbalize understanding, and repeat plan back to me. Follow up as needed prior to next OV on 06/08/2022      Completed Refills   Requested Prescriptions     Signed Prescriptions Disp Refills    rizatriptan (MAXALT) 10 MG tablet 9 tablet 5     Sig: Take 1 tablet by mouth once as needed for Migraine May repeat in 2 hours if needed     Return if symptoms worsen or fail to improve.   Orders Placed This Encounter   Medications    rizatriptan (MAXALT) 10 MG tablet     Sig: Take 1 tablet by mouth once as needed for Migraine May repeat in 2 hours if needed     Dispense:  9 tablet     Refill:  5     Orders Placed This Encounter   Procedures    MRI BRAIN W WO CONTRAST     Standing Status:   Future     Standing Expiration Date:   5/2/2023     Order Specific Question:   STAT Creatinine as needed:     Answer:   No     Order Specific Question:   Reason for exam:     Answer:   increasing frequency of headaches with dizziness, tinnitus; eval for schwanoma vs evidence of meniere's disease     Order Specific Question:   What is the sedation requirement? Answer:   None       Patient Instructions     SURVEY:    You may be receiving a survey from [employer] regarding your visit today. Please complete the survey to enable us to provide the highest quality of care to you and your family. If you cannot score us a very good on any question, please call the office to discuss how we could of made your experience a very good one. Thank you.       Clinical Care Team:     Dr. Shira Rain, [facility]      ClericalTeam:     [id number] [facility]      Electronically signed by Bonnie Goode DO on 5/2/2022 at 9:29 AM       Completed Refills   Requested Prescriptions     Signed Prescriptions Disp Refills    rizatriptan (MAXALT) 10 MG tablet 9 tablet 5     Sig: Take 1 tablet by mouth once as needed for Migraine May repeat in 2 hours if needed

## 2022-05-03 ENCOUNTER — TELEPHONE (OUTPATIENT)
Dept: FAMILY MEDICINE CLINIC | Age: 22
End: 2022-05-03

## 2022-05-03 DIAGNOSIS — H53.8 BLURRY VISION: Primary | ICD-10-CM

## 2022-05-03 NOTE — TELEPHONE ENCOUNTER
Pt would like his CT order changed to STAT. [place] and wanted to have it completed  Before he leaves ?  Please advise

## 2022-05-03 NOTE — TELEPHONE ENCOUNTER
Called scheduling, in order to schedule any testing we need a updated insurance card, notified pt/         pt misinformed me on the test he is wanting the MRI of there brain stat

## 2022-05-04 ENCOUNTER — HOSPITAL ENCOUNTER (OUTPATIENT)
Dept: MRI IMAGING | Age: 22
Discharge: HOME | End: 2022-05-06
Payer: COMMERCIAL

## 2022-05-04 DIAGNOSIS — H53.8 BLURRY VISION: ICD-10-CM

## 2022-05-04 PROCEDURE — A9577 INJ MULTIHANCE: HCPCS | Performed by: STUDENT IN AN ORGANIZED HEALTH CARE EDUCATION/TRAINING PROGRAM

## 2022-05-04 PROCEDURE — 6360000004 HC RX CONTRAST MEDICATION: Performed by: STUDENT IN AN ORGANIZED HEALTH CARE EDUCATION/TRAINING PROGRAM

## 2022-05-04 PROCEDURE — 70553 MRI BRAIN STEM W/O & W/DYE: CPT

## 2022-05-04 RX ADMIN — GADOBENATE DIMEGLUMINE 20 ML: 529 INJECTION, SOLUTION INTRAVENOUS at 13:10

## 2022-06-08 ENCOUNTER — OFFICE VISIT (OUTPATIENT)
Dept: FAMILY MEDICINE CLINIC | Age: 22
End: 2022-06-08
Payer: COMMERCIAL

## 2022-06-08 VITALS
SYSTOLIC BLOOD PRESSURE: 134 MMHG | OXYGEN SATURATION: 97 % | DIASTOLIC BLOOD PRESSURE: 82 MMHG | BODY MASS INDEX: 32.43 KG/M2 | HEIGHT: 72 IN | RESPIRATION RATE: 20 BRPM | WEIGHT: 239.4 LBS | HEART RATE: 88 BPM

## 2022-06-08 DIAGNOSIS — F41.0 PANIC ATTACKS: ICD-10-CM

## 2022-06-08 DIAGNOSIS — K58.0 IRRITABLE BOWEL SYNDROME WITH DIARRHEA: ICD-10-CM

## 2022-06-08 DIAGNOSIS — F51.02 ADJUSTMENT INSOMNIA: ICD-10-CM

## 2022-06-08 DIAGNOSIS — F32.1 CURRENT MODERATE EPISODE OF MAJOR DEPRESSIVE DISORDER WITHOUT PRIOR EPISODE (HCC): Primary | ICD-10-CM

## 2022-06-08 DIAGNOSIS — G43.109 MIGRAINE WITH AURA AND WITHOUT STATUS MIGRAINOSUS, NOT INTRACTABLE: ICD-10-CM

## 2022-06-08 PROBLEM — K59.1 FUNCTIONAL DIARRHEA: Status: RESOLVED | Noted: 2021-05-27 | Resolved: 2022-06-08

## 2022-06-08 PROCEDURE — 99214 OFFICE O/P EST MOD 30 MIN: CPT | Performed by: STUDENT IN AN ORGANIZED HEALTH CARE EDUCATION/TRAINING PROGRAM

## 2022-06-08 RX ORDER — VENLAFAXINE HYDROCHLORIDE 75 MG/1
75 CAPSULE, EXTENDED RELEASE ORAL DAILY
Qty: 90 CAPSULE | Refills: 1 | Status: SHIPPED | OUTPATIENT
Start: 2022-06-08 | End: 2022-10-17 | Stop reason: DRUGHIGH

## 2022-06-08 SDOH — ECONOMIC STABILITY: FOOD INSECURITY: WITHIN THE PAST 12 MONTHS, THE FOOD YOU BOUGHT JUST DIDN'T LAST AND YOU DIDN'T HAVE MONEY TO GET MORE.: NEVER TRUE

## 2022-06-08 SDOH — ECONOMIC STABILITY: FOOD INSECURITY: WITHIN THE PAST 12 MONTHS, YOU WORRIED THAT YOUR FOOD WOULD RUN OUT BEFORE YOU GOT MONEY TO BUY MORE.: NEVER TRUE

## 2022-06-08 ASSESSMENT — PATIENT HEALTH QUESTIONNAIRE - PHQ9
9. THOUGHTS THAT YOU WOULD BE BETTER OFF DEAD, OR OF HURTING YOURSELF: 0
1. LITTLE INTEREST OR PLEASURE IN DOING THINGS: 0
4. FEELING TIRED OR HAVING LITTLE ENERGY: 0
8. MOVING OR SPEAKING SO SLOWLY THAT OTHER PEOPLE COULD HAVE NOTICED. OR THE OPPOSITE, BEING SO FIGETY OR RESTLESS THAT YOU HAVE BEEN MOVING AROUND A LOT MORE THAN USUAL: 0
10. IF YOU CHECKED OFF ANY PROBLEMS, HOW DIFFICULT HAVE THESE PROBLEMS MADE IT FOR YOU TO DO YOUR WORK, TAKE CARE OF THINGS AT HOME, OR GET ALONG WITH OTHER PEOPLE: 0
SUM OF ALL RESPONSES TO PHQ QUESTIONS 1-9: 0
5. POOR APPETITE OR OVEREATING: 0
SUM OF ALL RESPONSES TO PHQ9 QUESTIONS 1 & 2: 0
6. FEELING BAD ABOUT YOURSELF - OR THAT YOU ARE A FAILURE OR HAVE LET YOURSELF OR YOUR FAMILY DOWN: 0
SUM OF ALL RESPONSES TO PHQ QUESTIONS 1-9: 0
7. TROUBLE CONCENTRATING ON THINGS, SUCH AS READING THE NEWSPAPER OR WATCHING TELEVISION: 0
SUM OF ALL RESPONSES TO PHQ QUESTIONS 1-9: 0
2. FEELING DOWN, DEPRESSED OR HOPELESS: 0
3. TROUBLE FALLING OR STAYING ASLEEP: 0
SUM OF ALL RESPONSES TO PHQ QUESTIONS 1-9: 0

## 2022-06-08 ASSESSMENT — ENCOUNTER SYMPTOMS
SORE THROAT: 0
WHEEZING: 0
COUGH: 0
BACK PAIN: 0
DIARRHEA: 0
SINUS PAIN: 0
VOMITING: 0
ABDOMINAL PAIN: 0
NAUSEA: 0

## 2022-06-08 ASSESSMENT — COLUMBIA-SUICIDE SEVERITY RATING SCALE - C-SSRS
2. HAVE YOU ACTUALLY HAD ANY THOUGHTS OF KILLING YOURSELF?: NO
1. WITHIN THE PAST MONTH, HAVE YOU WISHED YOU WERE DEAD OR WISHED YOU COULD GO TO SLEEP AND NOT WAKE UP?: NO
6. HAVE YOU EVER DONE ANYTHING, STARTED TO DO ANYTHING, OR PREPARED TO DO ANYTHING TO END YOUR LIFE?: NO

## 2022-06-08 ASSESSMENT — SOCIAL DETERMINANTS OF HEALTH (SDOH): HOW HARD IS IT FOR YOU TO PAY FOR THE VERY BASICS LIKE FOOD, HOUSING, MEDICAL CARE, AND HEATING?: NOT HARD AT ALL

## 2022-06-08 NOTE — PATIENT INSTRUCTIONS
SURVEY:    You may be receiving a survey from [employer] regarding your visit today. Please complete the survey to enable us to provide the highest quality of care to you and your family. If you cannot score us a very good on any question, please call the office to discuss how we could of made your experience a very good one. Thank you.       Clinical Care Team:     Dr. Glenn Nickerson URMILA      ClericalTeam:     [id number] [facility]

## 2022-06-08 NOTE — PROGRESS NOTES
HPI Notes    Name: Apple Mcgraw  : 2000         Chief Complaint:     Chief Complaint   Patient presents with    Annual Exam       History of Present Illness:        HPI    This is a very nice 25year old young man presenting for a general well visit. He reports he feels well today and has no particular concerns. MDD with panic attacks: mood is euthymic, and he reports that his panic attacks are \"gone\" and he has not had one in many months. He is noting no new AEs or SEs from Effexor and has not had to use alprazolam in months. He is sleeping well \"most nights\", appetite is good and there is no anhedonia. He actually was able to complete a business trip to [place] recently and had a productive and enjoyable trip. Migraines: he has had two migraines a week since our last OV. He has used Maxalt to excellent effect. He is satisfied with the efficacy of this medication. Frequency is not increasing, but not decreasing either. He is noticing that some are triggered by jaw clenching. IBS-D: this has completely resolved. Past Medical History:     Past Medical History:   Diagnosis Date    IBS (irritable bowel syndrome)     Insomnia     Major depressive disorder     Migraine     Panic attacks 2021      Reviewed all health maintenance requirements and ordered appropriate tests  Health Maintenance Due   Topic Date Due    COVID-19 Vaccine (1) Never done    HPV vaccine (1 - Male 2-dose series) Never done       Past Surgical History:     Past Surgical History:   Procedure Laterality Date    TONSILLECTOMY          Medications:       Prior to Admission medications    Medication Sig Start Date End Date Taking?  Authorizing Provider   venlafaxine (EFFEXOR XR) 75 MG extended release capsule Take 1 capsule by mouth daily 21  Yes Nadia Pena DO   rizatriptan (MAXALT) 10 MG tablet Take 1 tablet by mouth once as needed for Migraine May repeat in 2 hours if needed 22  Nadia Kern DO Jean   omeprazole (PRILOSEC) 40 MG delayed release capsule Take 1 capsule by mouth every morning (before breakfast)  Patient not taking: Reported on 6/8/2022 4/11/22   Lady Forde DO Jean   naproxen sodium (ANAPROX) 550 MG tablet Take 550 mg by mouth 2 times daily (with meals) Take 2 tables as needed  Patient not taking: Reported on 4/11/2022    Historical Provider, MD        Allergies:       Patient has no known allergies. Social History:     Tobacco:    reports that he has never smoked. He has never used smokeless tobacco.  Alcohol:      reports no history of alcohol use. Drug Use:  reports no history of drug use. Family History:     No family history on file. Review of Systems:       Review of Systems   Constitutional: Negative for fever. HENT: Negative for sinus pain, sneezing and sore throat. Respiratory: Negative for cough and wheezing. Cardiovascular: Negative for chest pain. Gastrointestinal: Negative for abdominal pain, diarrhea, nausea and vomiting. Musculoskeletal: Negative for back pain. Skin: Negative for rash. Neurological: Positive for headaches. Negative for dizziness, weakness, light-headedness and numbness. Hematological: Negative for adenopathy. Psychiatric/Behavioral: Negative for behavioral problems, decreased concentration, dysphoric mood and sleep disturbance. The patient is not nervous/anxious. Physical Exam:     Vitals:  /82 (Site: Left Upper Arm, Position: Sitting, Cuff Size: Large Adult)   Pulse 88   Resp 20   Ht 6' 0.2\" (1.834 m)   Wt 239 lb 6.4 oz (108.6 kg)   SpO2 97%   BMI 32.29 kg/m²       Physical Exam  Vitals and nursing note reviewed. Constitutional:       General: He is not in acute distress. Appearance: Normal appearance. He is normal weight. HENT:      Right Ear: Tympanic membrane, ear canal and external ear normal. There is no impacted cerumen.       Left Ear: Tympanic membrane, ear canal and external ear normal. There is no impacted cerumen. Nose: Nose normal. No congestion. Mouth/Throat:      Mouth: Mucous membranes are moist.      Pharynx: Oropharynx is clear. No oropharyngeal exudate. Eyes:      General: No scleral icterus. Conjunctiva/sclera: Conjunctivae normal.      Pupils: Pupils are equal, round, and reactive to light. Cardiovascular:      Rate and Rhythm: Normal rate and regular rhythm. Pulses: Normal pulses. Heart sounds: Normal heart sounds. No murmur heard. Pulmonary:      Effort: Pulmonary effort is normal. No respiratory distress. Breath sounds: Normal breath sounds. No wheezing. Abdominal:      General: Abdomen is flat. Bowel sounds are normal.      Palpations: Abdomen is soft. Tenderness: There is no abdominal tenderness. Musculoskeletal:         General: Normal range of motion. Cervical back: Normal range of motion and neck supple. No rigidity. No muscular tenderness. Lymphadenopathy:      Cervical: No cervical adenopathy. Skin:     General: Skin is warm and dry. Capillary Refill: Capillary refill takes less than 2 seconds. Neurological:      General: No focal deficit present. Mental Status: He is alert and oriented to person, place, and time. Mental status is at baseline. Psychiatric:         Mood and Affect: Mood normal.         Behavior: Behavior normal.         Thought Content:  Thought content normal.           Data:     Lab Results   Component Value Date     04/09/2022    K 4.0 04/09/2022     04/09/2022    CO2 27 04/09/2022    BUN 16 04/09/2022    CREATININE 1.28 04/09/2022    GLUCOSE 93 04/09/2022    GLUCOSE 93 11/25/2011    PROT 8.4 04/09/2022    LABALBU 4.8 04/09/2022    BILITOT 0.33 04/09/2022    ALKPHOS 145 04/09/2022    AST 27 04/09/2022    ALT 35 04/09/2022     Lab Results   Component Value Date    WBC 9.1 04/09/2022    RBC 5.40 04/09/2022    RBC 4.52 11/25/2011    HGB 14.8 04/09/2022    HCT 44.3 04/09/2022    MCV 82.2 04/09/2022    MCH 27.3 04/09/2022    MCHC 33.3 04/09/2022    RDW 13.7 04/09/2022     04/09/2022     11/25/2011    MPV NOT REPORTED 01/17/2022     Lab Results   Component Value Date    TSH 2.47 12/25/2021     Lab Results   Component Value Date    LABA1C 5.2 11/04/2021          Assessment & Plan        Diagnosis Orders   1. Current moderate episode of major depressive disorder without prior episode ([facility] [address]     2. Panic attacks     3. Adjustment insomnia     4. Migraine with aura and without status migrainosus, not intractable     5. Irritable bowel syndrome with diarrhea         1.--3. Overall, he is doing extremely well with treatment with Effexor. We are at 6 months of treatment currently. I anticipate that we could taper this in another 3 to 6 months. We will have the interval visit in 3 months to discuss tapering this medication. He is not using any Xanax at this time. He is sleeping well. 4.  He is responding well to treatment with Maxalt. He is not in any need of any new refills for this medication. He is still having more than 4 headache days per month, we could consider prophylactic therapy for his migraine headaches, but he was able to identify a main trigger which is jaw discomfort and clenching. I have given him exercises program instructions for TMD.  We will readdress frequency after he begins using exercise program.    5.  This problem has resolved at this point    We discussed screening for hepatitis C, HIV. He is of low risk for exposure to both viruses and declines screening. This is reasonable. Follow up in 3 months for discussion of Effexor taper. Completed Refills   Requested Prescriptions     Pending Prescriptions Disp Refills    venlafaxine (EFFEXOR XR) 75 MG extended release capsule 90 capsule 1     Sig: Take 1 capsule by mouth daily     Return in about 3 months (around 9/8/2022) for Effexor Taper.   No orders of the defined types were placed in this encounter. No orders of the defined types were placed in this encounter. Patient Instructions     SURVEY:    You may be receiving a survey from [email or web address] regarding your visit today. Please complete the survey to enable us to provide the highest quality of care to you and your family. If you cannot score us a very good on any question, please call the office to discuss how we could of made your experience a very good one. Thank you.       Clinical Care Team:     Dr. Dg Estevez, URMILA      ClericalTeam:     Matty Peña      Electronically signed by La Estrada DO on 6/8/2022 at 8:37 AM           Completed Refills   Requested Prescriptions     Pending Prescriptions Disp Refills    venlafaxine (EFFEXOR XR) 75 MG extended release capsule 90 capsule 1     Sig: Take 1 capsule by mouth daily

## 2022-06-28 ENCOUNTER — PATIENT MESSAGE (OUTPATIENT)
Dept: FAMILY MEDICINE CLINIC | Age: 22
End: 2022-06-28

## 2022-06-28 ENCOUNTER — HOSPITAL ENCOUNTER (OUTPATIENT)
Age: 22
Discharge: HOME | End: 2022-06-28
Payer: COMMERCIAL

## 2022-06-28 DIAGNOSIS — R50.9 ACUTE FEBRILE ILLNESS: ICD-10-CM

## 2022-06-28 DIAGNOSIS — R50.9 ACUTE FEBRILE ILLNESS: Primary | ICD-10-CM

## 2022-06-28 LAB
ABSOLUTE EOS #: 0.1 K/UL (ref 0–0.4)
ABSOLUTE LYMPH #: 1.5 K/UL (ref 1–4.8)
ABSOLUTE MONO #: 0.6 K/UL (ref 0–1)
ANION GAP SERPL CALCULATED.3IONS-SCNC: 10 MMOL/L (ref 9–17)
BASOPHILS # BLD: 1 % (ref 0–2)
BASOPHILS ABSOLUTE: 0 K/UL (ref 0–0.2)
BUN BLDV-MCNC: 9 MG/DL (ref 6–20)
BUN/CREAT BLD: 7 (ref 9–20)
CALCIUM SERPL-MCNC: 9.5 MG/DL (ref 8.6–10.4)
CHLORIDE BLD-SCNC: 99 MMOL/L (ref 98–107)
CO2: 28 MMOL/L (ref 20–31)
CREAT SERPL-MCNC: 1.34 MG/DL (ref 0.7–1.2)
DIFFERENTIAL TYPE: YES
EOSINOPHILS RELATIVE PERCENT: 2 % (ref 0–5)
GFR AFRICAN AMERICAN: >60 ML/MIN
GFR NON-AFRICAN AMERICAN: >60 ML/MIN
GFR SERPL CREATININE-BSD FRML MDRD: ABNORMAL ML/MIN/{1.73_M2}
GLUCOSE BLD-MCNC: 122 MG/DL (ref 70–99)
HCT VFR BLD CALC: 39.6 % (ref 41–53)
HEMOGLOBIN: 13.3 G/DL (ref 13.5–17.5)
LYMPHOCYTES # BLD: 25 % (ref 13–44)
MCH RBC QN AUTO: 27.3 PG (ref 26–34)
MCHC RBC AUTO-ENTMCNC: 33.7 G/DL (ref 31–37)
MCV RBC AUTO: 81 FL (ref 80–100)
MONOCYTES # BLD: 11 % (ref 5–9)
PDW BLD-RTO: 14.6 % (ref 12.1–15.2)
PLATELET # BLD: 269 K/UL (ref 140–450)
POTASSIUM SERPL-SCNC: 4.6 MMOL/L (ref 3.7–5.3)
RBC # BLD: 4.89 M/UL (ref 4.5–5.9)
SEG NEUTROPHILS: 61 % (ref 39–75)
SEGMENTED NEUTROPHILS ABSOLUTE COUNT: 3.6 K/UL (ref 2.1–6.5)
SODIUM BLD-SCNC: 137 MMOL/L (ref 135–144)
WBC # BLD: 5.9 K/UL (ref 3.5–11)

## 2022-06-28 PROCEDURE — 36415 COLL VENOUS BLD VENIPUNCTURE: CPT

## 2022-06-28 PROCEDURE — 85025 COMPLETE CBC W/AUTO DIFF WBC: CPT

## 2022-06-28 PROCEDURE — 80048 BASIC METABOLIC PNL TOTAL CA: CPT

## 2022-06-28 NOTE — TELEPHONE ENCOUNTER
From: Efraín Arceo  To: Dr. Raad Kim  Sent: 6/28/2022 12:07 AM EDT  Subject: Paul Arboleda doc, I have been running 103 fever since last Thursday with chest pain headaches scratchy throat dry cough extreme fatigue it also hurts extra when I take deep breaths, Ive been taking day quill and NyQuil and when Im on the meds it takes the edge off but as soon as they wear off I spike my fever again and its like nothing has changed should I make an appointment with you or go to walk in ? Cause it seems like when I have certain symptoms its harder to get seen my a medical professional. Im just looking for some antibiotics and advice thanks.

## 2022-07-01 NOTE — ED NOTES
Patient reports improvement of chest pain following toradol administration.       Stephen Russ RN  04/09/22 2022 Ketoconazole Counseling:   Patient counseled regarding improving absorption with orange juice.  Adverse effects include but are not limited to breast enlargement, headache, diarrhea, nausea, upset stomach, liver function test abnormalities, taste disturbance, and stomach pain.  There is a rare possibility of liver failure that can occur when taking ketoconazole. The patient understands that monitoring of LFTs may be required, especially at baseline. The patient verbalized understanding of the proper use and possible adverse effects of ketoconazole.  All of the patient's questions and concerns were addressed.

## 2022-07-15 ENCOUNTER — OFFICE VISIT (OUTPATIENT)
Dept: PRIMARY CARE CLINIC | Age: 22
End: 2022-07-15
Payer: COMMERCIAL

## 2022-07-15 ENCOUNTER — HOSPITAL ENCOUNTER (OUTPATIENT)
Age: 22
Discharge: HOME | End: 2022-07-15
Payer: COMMERCIAL

## 2022-07-15 VITALS
OXYGEN SATURATION: 100 % | HEIGHT: 73 IN | TEMPERATURE: 98.4 F | SYSTOLIC BLOOD PRESSURE: 139 MMHG | RESPIRATION RATE: 18 BRPM | HEART RATE: 92 BPM | WEIGHT: 220 LBS | BODY MASS INDEX: 29.16 KG/M2 | DIASTOLIC BLOOD PRESSURE: 89 MMHG

## 2022-07-15 DIAGNOSIS — J02.9 SORE THROAT: ICD-10-CM

## 2022-07-15 DIAGNOSIS — R50.9 FEVER, UNSPECIFIED FEVER CAUSE: ICD-10-CM

## 2022-07-15 DIAGNOSIS — J03.90 TONSILLITIS WITH EXUDATE: Primary | ICD-10-CM

## 2022-07-15 LAB
MONONUCLEOSIS SCREEN: NEGATIVE
S PYO AG THROAT QL: NORMAL

## 2022-07-15 PROCEDURE — 87880 STREP A ASSAY W/OPTIC: CPT | Performed by: NURSE PRACTITIONER

## 2022-07-15 PROCEDURE — 36415 COLL VENOUS BLD VENIPUNCTURE: CPT

## 2022-07-15 PROCEDURE — 86308 HETEROPHILE ANTIBODY SCREEN: CPT

## 2022-07-15 PROCEDURE — 99213 OFFICE O/P EST LOW 20 MIN: CPT | Performed by: NURSE PRACTITIONER

## 2022-07-15 RX ORDER — AMOXICILLIN 500 MG/1
500 CAPSULE ORAL 2 TIMES DAILY
Qty: 20 CAPSULE | Refills: 0 | Status: SHIPPED | OUTPATIENT
Start: 2022-07-15 | End: 2022-07-25

## 2022-07-15 NOTE — PROGRESS NOTES
Chief Complaint   URI (Started 3 weeks ago, sore throat, swollen lymph nodes, congestion, dry cough, post nasal drip, severe fatigue. Lab work done 2 weeks ago and was told it was viral and wait it out but he is still not getting better. White patches on tongue.)      History of Present Illness   Source of history provided by: patient. Jose G Mroa is a 25 y.o. old male who has a past medical history of:   Past Medical History:   Diagnosis Date    IBS (irritable bowel syndrome)     Insomnia     Irritable bowel syndrome with diarrhea 6/1/2021    Worked up, treated with Xifaxan 6/2021    Major depressive disorder     Migraine     Panic attacks 05/27/2021    Presents to the clinic for evaluation of 3 weeks of sore throat with as well as lymph nodes and fever. According to patient he was seen and evaluated by his PCP with lab work, he reports that symptoms were likely viral.  Today, he is concerned that symptoms are not improving and seem to be worsening. He has tried over-the-counter medications without improvement. Denies CP, dyspnea, LE edema, abdominal pain, vomiting, rash, or lethargy. ROS   Pertinent positives and negatives are stated within HPI, all other systems reviewed and are negative. Surgical History:  has a past surgical history that includes Tonsillectomy. Social History:  reports that he has never smoked. He has never used smokeless tobacco. He reports that he does not drink alcohol and does not use drugs. Family History: family history is not on file. Allergies: Patient has no known allergies. Physical Exam    VS:  /89 (Site: Right Upper Arm, Position: Sitting, Cuff Size: Medium Adult)   Pulse 92   Temp 98.4 °F (36.9 °C) (Oral)   Resp 18   Ht 6' 1\" (1.854 m)   Wt 220 lb (99.8 kg)   SpO2 100%   BMI 29.03 kg/m²      Constitutional:  Alert, development consistent with age. NAD. HEENT:     Head: Normocephalic.  NC/NT,  No maxillary or frontal sinus tenderness on palpation. Eyes: Pupils equal, round, and reactive to light and accommodation. Extraocular movements intact. No conjunctival discharge. Sclera white. Ears: TMs pearly grey, no perforations, light reflex sharp, non-bulging. Canals clear, non-erythematous, no lesions. Nose: Nares patent, no lesions. Turbinates pink/red and non-edematous. Oral Cavity: Mucosa moist, pink, and smooth. Tonsils 2+ and coated in pale white exudate bilaterally. Posterior pharyngeal wall, uvula and anterior pillars as well as bilateral tonsils beefy erythema. Neck:  Normal ROM. Supple. Left sided anterior cervical adenopathy noted. Lungs: CTAB without wheezes, rales, or rhonchi. CV:  Regular rate and rhythm, normal heart sounds, without pathological murmurs, ectopy, gallops, or rubs. Skin:  Normal turgor. Warm, dry, without visible rash. Lymphatic: No lymphangitis or adenopathy noted. Neurological:  Oriented. Motor functions intact. Lab / Imaging Results   (All laboratory and radiology results have been personally reviewed by myself)  Labs:  Results for orders placed or performed in visit on 07/15/22   POCT rapid strep A   Result Value Ref Range    Strep A Ag None Detected None Detected       Imaging: All Radiology results interpreted by Radiologist unless otherwise noted. No results found. Medical Decision Making   Pt non-toxic, in no apparent distress and stable at time of discharge. Assessment/Plan   Sabas was seen today for uri. Diagnoses and all orders for this visit:    Tonsillitis with exudate    Sore throat  -     POCT rapid strep A  -     Mononucleosis Screen; Future    Fever, unspecified fever cause  -     Mononucleosis Screen; Future        - Chance CJ Madrid appears well, hydrated and with clear lung sounds without distress.    - POC Strep resulted as negative.  -We will send for mono screen.   Advised if mono is negative will treat with amoxicillin for concern of a false negative rapid

## 2022-07-15 NOTE — PATIENT INSTRUCTIONS
SURVEY:    You may be receiving a survey from [employer] regarding your visit today. Please complete the survey to enable us to provide the highest quality of care to you and your family. If you cannot score us a very good on any question, please call the office to discuss how we could of made your experience a very good one. Thank you for letting us take care of you today. We hope all your questions were addressed. If a question was overlooked or something else comes to mind after you return home, please contact a member of your Care Team listed below.     Thank you,   Noy Vasquez MA    Your Care Team at [address] [address], [facility]  Noy Vasquez, [address] [place] [address]             Walk-in contact numbers:             Phone: [phone]                 Fax: [phone]           [facility] Walk-in Hours:  Mon-Thurs: 9:00 am - 5:30 pm     Friday: 8:00 am - 12:00 pm           Sat-Sun: CLOSED

## 2022-08-09 ENCOUNTER — HOSPITAL ENCOUNTER (EMERGENCY)
Age: 22
Discharge: HOME | End: 2022-08-09
Attending: EMERGENCY MEDICINE
Payer: COMMERCIAL

## 2022-08-09 ENCOUNTER — APPOINTMENT (OUTPATIENT)
Dept: CT IMAGING | Age: 22
End: 2022-08-09
Payer: COMMERCIAL

## 2022-08-09 VITALS
DIASTOLIC BLOOD PRESSURE: 93 MMHG | BODY MASS INDEX: 31.01 KG/M2 | TEMPERATURE: 98.9 F | HEART RATE: 100 BPM | OXYGEN SATURATION: 99 % | SYSTOLIC BLOOD PRESSURE: 150 MMHG | RESPIRATION RATE: 18 BRPM | HEIGHT: 73 IN | WEIGHT: 234 LBS

## 2022-08-09 DIAGNOSIS — R10.10 PAIN OF UPPER ABDOMEN: Primary | ICD-10-CM

## 2022-08-09 LAB
ABSOLUTE EOS #: 0.3 K/UL (ref 0–0.4)
ABSOLUTE LYMPH #: 4.9 K/UL (ref 1–4.8)
ABSOLUTE MONO #: 0.6 K/UL (ref 0–1)
ALBUMIN SERPL-MCNC: 4.8 G/DL (ref 3.5–5.2)
ALP BLD-CCNC: 154 U/L (ref 40–129)
ALT SERPL-CCNC: 46 U/L (ref 5–41)
ANION GAP SERPL CALCULATED.3IONS-SCNC: 10 MMOL/L (ref 9–17)
AST SERPL-CCNC: 31 U/L
BASOPHILS # BLD: 0 % (ref 0–2)
BASOPHILS ABSOLUTE: 0 K/UL (ref 0–0.2)
BILIRUB SERPL-MCNC: 0.48 MG/DL (ref 0.3–1.2)
BUN BLDV-MCNC: 15 MG/DL (ref 6–20)
BUN/CREAT BLD: 14 (ref 9–20)
CALCIUM SERPL-MCNC: 9.8 MG/DL (ref 8.6–10.4)
CHLORIDE BLD-SCNC: 102 MMOL/L (ref 98–107)
CO2: 25 MMOL/L (ref 20–31)
CREAT SERPL-MCNC: 1.08 MG/DL (ref 0.7–1.2)
DIFFERENTIAL TYPE: YES
EOSINOPHILS RELATIVE PERCENT: 2 % (ref 0–5)
GFR AFRICAN AMERICAN: >60 ML/MIN
GFR NON-AFRICAN AMERICAN: >60 ML/MIN
GFR SERPL CREATININE-BSD FRML MDRD: ABNORMAL ML/MIN/{1.73_M2}
GLUCOSE BLD-MCNC: 86 MG/DL (ref 70–99)
HCT VFR BLD CALC: 44 % (ref 41–53)
HEMOGLOBIN: 14.4 G/DL (ref 13.5–17.5)
LIPASE: 28 U/L (ref 13–60)
LYMPHOCYTES # BLD: 43 % (ref 13–44)
MCH RBC QN AUTO: 26.7 PG (ref 26–34)
MCHC RBC AUTO-ENTMCNC: 32.8 G/DL (ref 31–37)
MCV RBC AUTO: 81.3 FL (ref 80–100)
MONOCYTES # BLD: 5 % (ref 5–9)
PDW BLD-RTO: 14.2 % (ref 12.1–15.2)
PLATELET # BLD: 340 K/UL (ref 140–450)
POTASSIUM SERPL-SCNC: 3.9 MMOL/L (ref 3.7–5.3)
RBC # BLD: 5.41 M/UL (ref 4.5–5.9)
SARS-COV-2, RAPID: NOT DETECTED
SEG NEUTROPHILS: 50 % (ref 39–75)
SEGMENTED NEUTROPHILS ABSOLUTE COUNT: 5.7 K/UL (ref 2.1–6.5)
SODIUM BLD-SCNC: 137 MMOL/L (ref 135–144)
SPECIMEN DESCRIPTION: NORMAL
TOTAL PROTEIN: 7.7 G/DL (ref 6.4–8.3)
WBC # BLD: 11.5 K/UL (ref 3.5–11)

## 2022-08-09 PROCEDURE — 85025 COMPLETE CBC W/AUTO DIFF WBC: CPT

## 2022-08-09 PROCEDURE — 6360000004 HC RX CONTRAST MEDICATION: Performed by: EMERGENCY MEDICINE

## 2022-08-09 PROCEDURE — 74177 CT ABD & PELVIS W/CONTRAST: CPT

## 2022-08-09 PROCEDURE — 99285 EMERGENCY DEPT VISIT HI MDM: CPT

## 2022-08-09 PROCEDURE — 6360000002 HC RX W HCPCS: Performed by: FAMILY MEDICINE

## 2022-08-09 PROCEDURE — C9803 HOPD COVID-19 SPEC COLLECT: HCPCS

## 2022-08-09 PROCEDURE — 87635 SARS-COV-2 COVID-19 AMP PRB: CPT

## 2022-08-09 PROCEDURE — 96374 THER/PROPH/DIAG INJ IV PUSH: CPT

## 2022-08-09 PROCEDURE — 83690 ASSAY OF LIPASE: CPT

## 2022-08-09 PROCEDURE — 80053 COMPREHEN METABOLIC PANEL: CPT

## 2022-08-09 RX ORDER — KETOROLAC TROMETHAMINE 30 MG/ML
30 INJECTION, SOLUTION INTRAMUSCULAR; INTRAVENOUS ONCE
Status: COMPLETED | OUTPATIENT
Start: 2022-08-09 | End: 2022-08-09

## 2022-08-09 RX ADMIN — KETOROLAC TROMETHAMINE 30 MG: 30 INJECTION, SOLUTION INTRAMUSCULAR; INTRAVENOUS at 20:38

## 2022-08-09 RX ADMIN — IOPAMIDOL 75 ML: 755 INJECTION, SOLUTION INTRAVENOUS at 18:57

## 2022-08-09 ASSESSMENT — PAIN SCALES - GENERAL: PAINLEVEL_OUTOF10: 8

## 2022-08-09 NOTE — ED PROVIDER NOTES
[facility] ED  [address], [address]  Phone: [facility]    Chief Complaint   Patient presents with    Abdominal Pain     Patient arrives to ER today with complaints of RUQ pain that has radiates to the epigastrium and to the back that began lastnight. Pt initially reported diarrhea lastnight and complains of nausea and bloating today. HPI Dorn Alpers is a 25 y.o. male who presents above-noted complaint. Patient presents with abdominal pain. Since he started last night midepigastric may be right sided upper pain and now migrated to the right upper quadrant. Associate with some nausea. No associate high fever chills. No urinary symptoms. Otherwise been doing okay. PAST MEDICAL HISTORY    Past Medical History:   Diagnosis Date    IBS (irritable bowel syndrome)     Insomnia     Irritable bowel syndrome with diarrhea 6/1/2021    Worked up, treated with Xifaxan 6/2021    Major depressive disorder     Migraine     Panic attacks 05/27/2021       SURGICAL HISTORY    Past Surgical History:   Procedure Laterality Date    TONSILLECTOMY         CURRENT MEDICATIONS    Current Outpatient Rx   Medication Sig Dispense Refill    venlafaxine (EFFEXOR XR) 75 MG extended release capsule Take 1 capsule by mouth daily 90 capsule 1    rizatriptan (MAXALT) 10 MG tablet Take 1 tablet by mouth once as needed for Migraine May repeat in 2 hours if needed 9 tablet 5       ALLERGIES    No Known Allergies    FAMILY HISTORY    History reviewed. No pertinent family history.     SOCIAL HISTORY    Social History     Socioeconomic History    Marital status: Single     Spouse name: None    Number of children: None    Years of education: None    Highest education level: None   Tobacco Use    Smoking status: Every Day     Types: Cigarettes, E-Cigarettes    Smokeless tobacco: Never   Vaping Use    Vaping Use: Former    Substances: Never   Substance and Sexual Activity    Alcohol use: No    Drug use: No     Social Determinants of Health     Financial Resource Strain: Low Risk     Difficulty of Paying Living Expenses: Not hard at all   Food Insecurity: No Food Insecurity    Worried About Running Out of Food in the Last Year: Never true    Ran Out of Food in the Last Year: Never true       REVIEW OF SYSTEMS    Positive abdominal pain without urinary symptoms dysuria hematuria melena. Loose stool  All systems negative except as marked. PHYSICAL EXAM    VITAL SIGNS: BP (!) 150/93   Pulse 100   Temp 98.9 °F (37.2 °C)   Resp 18   Ht 6' 1\" (1.854 m)   Wt 234 lb (106.1 kg)   SpO2 99%   BMI 30.87 kg/m²    Constitutional:  Alert not toxic or ill,   HENT:  normocephalic, Atraumatic,  Bilateral external ears normal, Oropharynx moist, No oral exudates, Nose normal.  Cervical Spine: Normal range of motion,  No stridor. No tenderness, Supple,  Eyes:  No discharge or  Swelling,Conjunctiva normal, PERRL, EOMI,  Respiratory: No respiratory distress, Normal breath sounds,  No wheezing, No chest tenderness. Cardiovascular:  Normal heart rate, Normal rhythm, No murmurs, No rubs, No gallops. GI: Right mid and upper reproducible pain, Bowel sounds normal, Soft, No masses, No pulsatile masses. No peritoneal tenderness  Mild musculoskeletal:  Intact distal pulses, No edema, No tenderness, No cyanosis, No clubbing. Good range of motion in all major joints. No tenderness to palpation or major deformities noted. Back:No tenderness. Integument:  Warm, Dry, No erythema, No rash (on exposed areas)   Lymphatic:  No lymphadenopathy noted. Neurologic:  Alert & oriented x 3, Normal motor function, Normal sensory function, No focal deficits noted.    Psychiatric:  Affect normal, Judgment normal, Mood normal.     EKG                           RADIOLOGY    CT ABDOMEN PELVIS W IV CONTRAST Additional Contrast? None    (Results Pending)       PROCEDURES none      CONSULTS:  None      CRITICAL CARE:  None    SCREENINGS:  BP (!) 150/93   Pulse 100   Temp 98.9 °F (37.2 °C)   Resp 18   Ht 6' 1\" (1.854 m)   Wt 234 lb (106.1 kg)   SpO2 99%   BMI 30.87 kg/m²     Screening For Hypertension and Follow-up (#317)  patient informed that blood pressure is abnormal and in the pre-hypertension range and should be rechecked by primary care      Screening For Tobacco Use and Cessation Intervention (#226):   reports that he has been smoking cigarettes and e-cigarettes. He has never used smokeless tobacco.  Tobacco cessation encouraged with brief counseling. Written home care instructions for smoking cessation provided. ED COURSE & MEDICAL DECISION MAKING    Pertinent Labs & Imaging studies reviewed. (See chart for details)  Upper abdominal pain without associated symptom such as high fever chills cough or respiratory symptoms. Abdomen shows some more upper abdominal right upper quadrant then lower abdominal pain although obviously differential includes cholecystitis, cholangitis, cryptitis and appendicitis. Checking a CAT scan of the area. REASSESSMENT  7:54 PM  Patient rechecked and updated on lab/xray status, progress and results. Patient was reassessed and condition was unchanged after no treatment. .. Needs nothing else at this time. Lab tests are unremarkable although white count slightly elevated 11,000. Slight elevated ALT and alk phos. Awaiting CAT scan results to assess for appendicitis, cholecystitis or other causes of right-sided belly pain. Patient be turned over to night shift provider for further assessment of the CAT scan. I did offer the patient pain medication he states he is fine right now. Further final diagnoses and referral and discharge meds will be provided by the overnight provider    FINAL IMPRESSION    Abdominal pain    PATIENT REFERRED TO:  No follow-up provider specified.     DISCHARGE MEDICATIONS:  New Prescriptions    No medications on file           Park Cho MD  08/10/22 1232

## 2022-08-10 NOTE — ED PROVIDER NOTES
Addendum to Dr. Maty Rodriguez note: 70-year-old female with acute right upper quadrant abdominal pain. Reviewed with Dr. Maty Rodriguez at change of shift. CT of the abdomen is pending. Laboratory and findings are reviewed. I agree with Dr. Maty Rodriguez treatment and evaluation. the patient is examined and found to have right upper quadrant abdominal pain without rigidity. Bowel sounds are active. Cardiopulmonary stable. No inguinal or scrotal tenderness. Afebrile. No generalized rash. Sclera are white. Laboratory reviewed. COVID is negative. CT of the abdomen shows no acute inflammatory process. No renal calculi or hydronephrosis. Appendix appears normal.  No biliary liver or gallbladder abnormality. On reexamination x-ray findings are reviewed with the patient and mother. Has less pain after treatment. Stable for discharge. No respiratory difficulty. Summation      Patient Course: 70-year-old male with right upper quadrant abdominal pain is evaluated initially and treated by Dr. Maty Rodriguez. Discussed at change of shift. Pending CT of the abdomen pelvis shows no acute intra-abdominal inflammatory process. No renal lithiasis or hydronephrosis. Abdominal pain is improved with treatment. Findings are discussed with patient and mother. Some diarrheal illness may indicate a viral component. Follow diarrhea and development of illness. If significant worsening seek medical attention. Otherwise follow-up with PCP.   Patient stable and improved at discharge    ED Medications administered this visit:    Medications   iopamidol (ISOVUE-370) 76 % injection 75 mL (75 mLs IntraVENous Given 8/9/22 1857)   ketorolac (TORADOL) injection 30 mg (30 mg IntraVENous Given 8/9/22 2038)       New Prescriptions from this visit:    Discharge Medication List as of 8/9/2022  9:30 PM          Follow-up:  Maribel Rodriguez DO  [address] [facility], [address]  [phone]    Schedule an appointment as soon as possible for a visit in 1 week          Final Impression:   1.  Pain of upper abdomen Stable              (Please note that portions of this note were completed with a voice recognition program.  Efforts were made to edit the dictations but occasionally words are mis-transcribed.)       Jenelle Wade DO  08/10/22 0114

## 2022-08-31 ENCOUNTER — OFFICE VISIT (OUTPATIENT)
Dept: FAMILY MEDICINE CLINIC | Age: 22
End: 2022-08-31
Payer: COMMERCIAL

## 2022-08-31 VITALS
HEART RATE: 102 BPM | SYSTOLIC BLOOD PRESSURE: 122 MMHG | DIASTOLIC BLOOD PRESSURE: 80 MMHG | OXYGEN SATURATION: 98 % | RESPIRATION RATE: 18 BRPM | WEIGHT: 237.4 LBS | HEIGHT: 73 IN | BODY MASS INDEX: 31.46 KG/M2

## 2022-08-31 DIAGNOSIS — G43.109 MIGRAINE WITH AURA AND WITHOUT STATUS MIGRAINOSUS, NOT INTRACTABLE: Primary | ICD-10-CM

## 2022-08-31 DIAGNOSIS — G44.219 EPISODIC TENSION-TYPE HEADACHE, NOT INTRACTABLE: ICD-10-CM

## 2022-08-31 PROCEDURE — 99213 OFFICE O/P EST LOW 20 MIN: CPT | Performed by: STUDENT IN AN ORGANIZED HEALTH CARE EDUCATION/TRAINING PROGRAM

## 2022-08-31 ASSESSMENT — ENCOUNTER SYMPTOMS
NAUSEA: 0
COUGH: 0
DIARRHEA: 0
BACK PAIN: 0
SINUS PAIN: 0
VOMITING: 0
ABDOMINAL PAIN: 0
SORE THROAT: 0
WHEEZING: 0

## 2022-08-31 NOTE — PROGRESS NOTES
HPI Notes    Name: Sissy Lovelace  : 2000         Chief Complaint:     Chief Complaint   Patient presents with    Headache     Are more consistent and pain getting worse states it causes his arm tingling or sob , dizziness and muscle weakness       History of Present Illness:        HPI    This is a 70-year-old young man presenting for evaluation of headaches. He reports that these are becoming more consistent and are also causing him upper extremity tingling and shortness of breath, dizziness, muscle weakness and other symptoms. He will occasionally have headaches of a squeezing band like quality around the head after the end of a long day at work, with more stress, or after clenching his jaw incessantly. These do not cause additional symptoms. These occur \"a couple times a week\"    The headaches associated with additional systemic symptoms are described as frontal to retroocular pain of a sharp quality, also felt on the crown of the head. He is now noticing these nearly every day. He has occasionally awoken with these headaches, endorsing photo/phonophobia, visual strain, nausea. Maxalt is noted to \"take the edge off\" but did not completely resolve the headaches. Symptoms typically resolve on resolution of the headache. He additionally endorses that each headache seems to be \"worse than the one that came before it\".      Past Medical History:     Past Medical History:   Diagnosis Date    IBS (irritable bowel syndrome)     Insomnia     Irritable bowel syndrome with diarrhea 2021    Worked up, treated with Xifaxan 2021    Major depressive disorder     Migraine     Panic attacks 2021      Reviewed all health maintenance requirements and ordered appropriate tests  Health Maintenance Due   Topic Date Due    COVID-19 Vaccine (1) Never done    Pneumococcal 0-64 years Vaccine (1 - PCV) 2006    HPV vaccine (1 - Male 2-dose series) Never done    DTaP/Tdap/Td vaccine (7 - Td or Tdap) 2022 Past Surgical History:     Past Surgical History:   Procedure Laterality Date    TONSILLECTOMY          Medications:       Prior to Admission medications    Medication Sig Start Date End Date Taking? Authorizing Provider   Ubrogepant 100 MG TABS Take 100 mg by mouth as needed (migraine) LOT: [id number] EXP: 22  Yes Vane Mclain DO   venlafaxine (EFFEXOR XR) 75 MG extended release capsule Take 1 capsule by mouth daily 22  Yes Giorgio Pena DO   rizatriptan (MAXALT) 10 MG tablet Take 1 tablet by mouth once as needed for Migraine May repeat in 2 hours if needed 22  Vane Mclain DO        Allergies:       Patient has no known allergies. Social History:     Tobacco:    reports that he has been smoking cigarettes and e-cigarettes. He has never used smokeless tobacco.  Alcohol:      reports no history of alcohol use. Drug Use:  reports no history of drug use. Family History:     No family history on file. Review of Systems:         Review of Systems   Constitutional:  Negative for fever. HENT:  Negative for sinus pain, sneezing and sore throat. Respiratory:  Negative for cough and wheezing. Cardiovascular:  Negative for chest pain. Gastrointestinal:  Negative for abdominal pain, diarrhea, nausea and vomiting. Musculoskeletal:  Positive for myalgias. Negative for back pain. Skin:  Negative for rash. Neurological:  Positive for dizziness, weakness and headaches. Hematological:  Negative for adenopathy. Psychiatric/Behavioral:  Negative for sleep disturbance. Physical Exam:     Vitals:  /80 (Site: Left Upper Arm, Position: Sitting, Cuff Size: Large Adult)   Pulse (!) 102   Resp 18   Ht 6' 1\" (1.854 m)   Wt 237 lb 6.4 oz (107.7 kg)   SpO2 98%   BMI 31.32 kg/m²       Physical Exam  Vitals and nursing note reviewed. Constitutional:       General: He is not in acute distress. Appearance: Normal appearance. He is normal weight. Musculoskeletal:         General: No swelling or tenderness. Normal range of motion. Skin:     General: Skin is warm and dry. Capillary Refill: Capillary refill takes less than 2 seconds. Neurological:      General: No focal deficit present. Mental Status: He is alert and oriented to person, place, and time. Mental status is at baseline. Cranial Nerves: No cranial nerve deficit. Sensory: No sensory deficit. Motor: No weakness. Coordination: Coordination normal.      Gait: Gait normal.      Deep Tendon Reflexes: Reflexes normal.   Psychiatric:         Mood and Affect: Mood normal.         Behavior: Behavior normal.         Thought Content: Thought content normal.               Data:     Lab Results   Component Value Date/Time     08/09/2022 05:45 PM    K 3.9 08/09/2022 05:45 PM     08/09/2022 05:45 PM    CO2 25 08/09/2022 05:45 PM    BUN 15 08/09/2022 05:45 PM    CREATININE 1.08 08/09/2022 05:45 PM    GLUCOSE 86 08/09/2022 05:45 PM    GLUCOSE 93 11/25/2011 01:40 PM    PROT 7.7 08/09/2022 05:45 PM    LABALBU 4.8 08/09/2022 05:45 PM    BILITOT 0.48 08/09/2022 05:45 PM    ALKPHOS 154 08/09/2022 05:45 PM    AST 31 08/09/2022 05:45 PM    ALT 46 08/09/2022 05:45 PM     Lab Results   Component Value Date/Time    WBC 11.5 08/09/2022 05:45 PM    RBC 5.41 08/09/2022 05:45 PM    RBC 4.52 11/25/2011 01:40 PM    HGB 14.4 08/09/2022 05:45 PM    HCT 44.0 08/09/2022 05:45 PM    MCV 81.3 08/09/2022 05:45 PM    MCH 26.7 08/09/2022 05:45 PM    MCHC 32.8 08/09/2022 05:45 PM    RDW 14.2 08/09/2022 05:45 PM     08/09/2022 05:45 PM     11/25/2011 01:40 PM    MPV NOT REPORTED 01/17/2022 09:21 PM     Lab Results   Component Value Date/Time    TSH 2.47 12/25/2021 03:45 AM     Lab Results   Component Value Date/Time    LABA1C 5.2 11/04/2021 08:55 AM          Assessment & Plan        Diagnosis Orders   1.  Migraine with aura and without status migrainosus, not intractable  [facility] - Deanna Steiner MD, Neurology, Blaise Cox 100 MG TABS      2. Episodic tension-type headache, not intractable            1.,  2.  I believe that this patient is having occasional headaches of 2 types, common migraines evolving into complex migraines as well as tension type headaches. He may manage tension type headaches with NSAIDs, caffeinated beverages as needed. Is Maxalt does not seem to be appreciably helping his migraine type headaches. I would recommend that he try Ubrelvy 100 mg daily as needed and be referred to neurology for a second opinion and assistance on management  The patient I had a long discussion about starting Ubrelvy 100 mg PO qD. We discussed its mechanism of action, intended goals, adverse effects, as well as common side effects. They were able to verbalize understanding, and repeat plan back to me. Follow up in 2 weeks for this problem along with Effexor taper    Completed Refills   Requested Prescriptions     Signed Prescriptions Disp Refills    Ubrogepant 100 MG TABS 2 tablet 0     Sig: Take 100 mg by mouth as needed (migraine) LOT: [id number] EXP: 07/2023     Return in about 2 weeks (around 9/14/2022) for complex migraines, Ubrelvy efficacy. Orders Placed This Encounter   Medications    Ubrogepant 100 MG TABS     Sig: Take 100 mg by mouth as needed (migraine) LOT: [id number] EXP: 07/2023     Dispense:  2 tablet     Refill:  0     Orders Placed This Encounter   Procedures    Fuad Olson MD, Neurology, [facility]     Referral Priority:   Routine     Referral Type:   Eval and Treat     Referral Reason:   Specialty Services Required     Referred to Provider:   Tanya Terrazas MD     Requested Specialty:   Neurology     Number of Visits Requested:   1         Patient Instructions     SURVEY:    You may be receiving a survey from [employer] regarding your visit today.     Please complete the survey to enable us to provide the highest quality of care to you and your family. If you cannot score us a very good on any question, please call the office to discuss how we could of made your experience a very good one. Thank you.       Clinical Care Team:     Dr. Zeina House URMILA      ClericalTeam:     Rubi Ruffin   Electronically signed by Nils Lr DO on 8/31/2022 at 1:47 PM           Completed Refills   Requested Prescriptions     Signed Prescriptions Disp Refills    Ubrogepant 100 MG TABS 2 tablet 0     Sig: Take 100 mg by mouth as needed (migraine) LOT: [id number] EXP: 07/2023

## 2022-08-31 NOTE — PATIENT INSTRUCTIONS
SURVEY:    You may be receiving a survey from [employer] regarding your visit today. Please complete the survey to enable us to provide the highest quality of care to you and your family. If you cannot score us a very good on any question, please call the office to discuss how we could of made your experience a very good one. Thank you.       Clinical Care Team:     Dr. Romero Christiansen, [facility]      [facility]     [id number] [facility]

## 2022-10-10 ENCOUNTER — OFFICE VISIT (OUTPATIENT)
Dept: NEUROLOGY | Age: 22
End: 2022-10-10
Payer: COMMERCIAL

## 2022-10-10 VITALS
TEMPERATURE: 97.8 F | RESPIRATION RATE: 18 BRPM | HEIGHT: 73 IN | DIASTOLIC BLOOD PRESSURE: 86 MMHG | SYSTOLIC BLOOD PRESSURE: 128 MMHG | BODY MASS INDEX: 32.37 KG/M2 | HEART RATE: 88 BPM | WEIGHT: 244.2 LBS

## 2022-10-10 DIAGNOSIS — G43.019 INTRACTABLE MIGRAINE WITHOUT AURA AND WITHOUT STATUS MIGRAINOSUS: Primary | ICD-10-CM

## 2022-10-10 PROCEDURE — 99203 OFFICE O/P NEW LOW 30 MIN: CPT | Performed by: NEUROMUSCULOSKELETAL MEDICINE, SPORTS MEDICINE

## 2022-10-10 RX ORDER — TOPIRAMATE 25 MG/1
25 TABLET ORAL NIGHTLY
Qty: 30 TABLET | Refills: 1 | Status: SHIPPED | OUTPATIENT
Start: 2022-10-10 | End: 2022-11-09

## 2022-10-10 RX ORDER — SUMATRIPTAN 50 MG/1
50 TABLET, FILM COATED ORAL
Qty: 9 TABLET | Refills: 1 | Status: SHIPPED | OUTPATIENT
Start: 2022-10-10 | End: 2022-10-10

## 2022-10-10 NOTE — PATIENT INSTRUCTIONS
SURVEY:    You may be receiving a survey from [employer] regarding your visit today. Please complete the survey to enable us to provide the highest quality of care to you and your family. If you cannot score us a very good on any question, please call the office to discuss how we could have made your experience a very good one. Thank you.

## 2022-10-10 NOTE — PROGRESS NOTES
NEUROLOGY CONSULT    Patient Name:  Thomas Tomlin  :   2000  Clinic Visit Date: 10/10/2022    I saw Mr. Thomas Tomlin  in the neurology clinic today for headaches. 71-year-old right-handed male history of depression, presents to the office today with symptoms of persistent recurrent headaches, or about 6 to 8 months. Headaches are usually right-sided, aching to sharp in nature, sudden onset without aura. Headache frequency is approximately 3-4 times a week, moderately severe at times without  vomiting nausea or double vision blurred vision ,slurred speech ,facial numbness neck pain or weakness or numbness in the extremities. Headache duration is approximately several hours to sometimes an entire day. He has tried Maxalt without any improvement. NSAIDs have not helped . REVIEW OF SYSTEMS    Constitutional Weight changes: present, change in appetite: present Fatigue: present; Fevers : absent, Any recent hospitalizations:  absent   HEENT Ears: normal,  Visual disturbance: present   Respiratory Shortness of breath: present, choking:  absent, Cough: absent, Snoring : absent   Cardiovascular Chest pain: present, Leg swelling :absent, palpitations : present, fainting : absent   GI Constipation: absent, Diarrhea: absent, Swallowing change: present    Urinary frequency: absent, Urinary urgency: absent, Urinary incontinence: absent   Musculoskeletal Neck pain: present, Back pain: present, Stiffness: absent, Muscle pain: absent, Joint pain: absent, restless leg : absent   Dermatological Hair loss: absent, Skin changes: absent   Neurological Confusion: absent, Trouble concentrating: absent, Seizures: absent;  Memory loss: absent, balance problem: present, Dizziness: present, vertigo: present, Weakness: present, Numbness absent, Tremor: absent, Spasm: absent, involuntary movement: absent, Speech difficulty: absent, Headache: present, Light sensitivity: present   Psychiatric Anxiety: present, Depression absent, drug abuse: absent, Hallucination: absent, mood disorder: absent, Suicidal ideations absent   Hematologic Abnormal bleeding: absent, Anemia: absent, Lymph gland changes: absent Clotting disorder: absent     Past Medical History:   Diagnosis Date    IBS (irritable bowel syndrome)     Insomnia     Irritable bowel syndrome with diarrhea 2021    Worked up, treated with Xifaxan 2021    Major depressive disorder     Migraine     Panic attacks 2021       Past Surgical History:   Procedure Laterality Date    TONSILLECTOMY         Social History     Socioeconomic History    Marital status: Single     Spouse name: Not on file    Number of children: Not on file    Years of education: Not on file    Highest education level: Not on file   Occupational History    Not on file   Tobacco Use    Smoking status: Former     Types: Cigarettes, E-Cigarettes     Quit date: 2022     Years since quittin.3    Smokeless tobacco: Never   Vaping Use    Vaping Use: Former    Substances: Never   Substance and Sexual Activity    Alcohol use: No    Drug use: No    Sexual activity: Not on file   Other Topics Concern    Not on file   Social History Narrative    Not on file     Social Determinants of Health     Financial Resource Strain: Low Risk     Difficulty of Paying Living Expenses: Not hard at all   Food Insecurity: No Food Insecurity    Worried About Running Out of Food in the Last Year: Never true    Ran Out of Food in the Last Year: Never true   Transportation Needs: Not on file   Physical Activity: Not on file   Stress: Not on file   Social Connections: Not on file   Intimate Partner Violence: Not on file   Housing Stability: Not on file       History reviewed. No pertinent family history.     Current Outpatient Medications   Medication Sig Dispense Refill    topiramate (TOPAMAX) 25 MG tablet Take 1 tablet by mouth nightly 30 tablet 1    SUMAtriptan (IMITREX) 50 MG tablet Take 1 tablet by mouth once as needed for Migraine Take a second dose 2 hours later for persistent headache. Not more than 2 tablets a day 9 tablet 1    venlafaxine (EFFEXOR XR) 75 MG extended release capsule Take 1 capsule by mouth daily 90 capsule 1     No current facility-administered medications for this visit. DATA:  Lab Results   Component Value Date    WBC 11.5 (H) 08/09/2022    HGB 14.4 08/09/2022     08/09/2022    ALT 46 (H) 08/09/2022    AST 31 08/09/2022     08/09/2022    K 3.9 08/09/2022     08/09/2022    CREATININE 1.08 08/09/2022    BUN 15 08/09/2022    CO2 25 08/09/2022    TSH 2.47 12/25/2021    LABA1C 5.2 11/04/2021       /86 (Site: Left Upper Arm, Position: Sitting, Cuff Size: Medium Adult)   Pulse 88   Temp 97.8 °F (36.6 °C) (Temporal)   Resp 18   Ht 6' 1\" (1.854 m)   Wt 244 lb 3.2 oz (110.8 kg)   BMI 32.22 kg/m²     NEUROLOGICAL EXAMINATION:     MENTAL STATUS:.  Normal.    CRANIAL NERVES: Pupils are equal and reactive. EOMS are normal.  No abnormal eye movements. Facial sensation is normal.  No facial weakness. Hearing is normal.   Palate and tongue movements are normal.  Shoulder shrug is symmetrical.    MOTOR EXAMINATION: Muscle tone is normal in all the limbs. Strength is 5/5 in both upper and lower limbs. No abnormal limb movements. SENSORY EXAMINATION: Normal.     STRETCH REFLEXES: 2+ and symmetrical in both the upper and lower limbs. GAIT:.  Normal.    IMPRESSION:  Migraine headaches without aura    PLAN:    1. Topamax 25 mg/day  2. Imitrex 50 mg as needed for severe headaches. 3.  Follow-up with a headache diary in 1 month      NOTE: This neurology evaluation is part of outpatient coverage at [place]  1-2 days per week. Patients requiring frequent evaluations or uncomfortable with potential 3-4 day turnaround on questions or calls  may be better served by a neurologist in the area full time.   [facility] at [facility]. Maegan is available for outpatient visits and procedures including EMG/NCS. Non-[facility] system neurologists also practice in [facility] (Dr. Bonilla Zaidi) and [facility] (Rogers Lauren).        Shayna Rogers MD   10/10/2022  4:49 PM

## 2022-10-17 ENCOUNTER — OFFICE VISIT (OUTPATIENT)
Dept: FAMILY MEDICINE CLINIC | Age: 22
End: 2022-10-17
Payer: COMMERCIAL

## 2022-10-17 VITALS
RESPIRATION RATE: 20 BRPM | HEIGHT: 73 IN | SYSTOLIC BLOOD PRESSURE: 120 MMHG | OXYGEN SATURATION: 98 % | WEIGHT: 244.4 LBS | BODY MASS INDEX: 32.39 KG/M2 | HEART RATE: 78 BPM | DIASTOLIC BLOOD PRESSURE: 86 MMHG

## 2022-10-17 DIAGNOSIS — G43.109 MIGRAINE WITH AURA AND WITHOUT STATUS MIGRAINOSUS, NOT INTRACTABLE: ICD-10-CM

## 2022-10-17 DIAGNOSIS — K21.00 GASTROESOPHAGEAL REFLUX DISEASE WITH ESOPHAGITIS WITHOUT HEMORRHAGE: ICD-10-CM

## 2022-10-17 DIAGNOSIS — F41.0 PANIC ATTACKS: ICD-10-CM

## 2022-10-17 DIAGNOSIS — F32.1 CURRENT MODERATE EPISODE OF MAJOR DEPRESSIVE DISORDER WITHOUT PRIOR EPISODE (HCC): Primary | ICD-10-CM

## 2022-10-17 DIAGNOSIS — L21.9 SEBORRHEIC DERMATITIS OF SCALP: ICD-10-CM

## 2022-10-17 PROCEDURE — 99214 OFFICE O/P EST MOD 30 MIN: CPT | Performed by: STUDENT IN AN ORGANIZED HEALTH CARE EDUCATION/TRAINING PROGRAM

## 2022-10-17 RX ORDER — VENLAFAXINE HYDROCHLORIDE 37.5 MG/1
37.5 CAPSULE, EXTENDED RELEASE ORAL DAILY
Qty: 30 CAPSULE | Refills: 3 | Status: SHIPPED | OUTPATIENT
Start: 2022-10-17

## 2022-10-17 RX ORDER — VENLAFAXINE HYDROCHLORIDE 75 MG/1
75 CAPSULE, EXTENDED RELEASE ORAL DAILY
Qty: 90 CAPSULE | Refills: 1 | Status: CANCELLED | OUTPATIENT
Start: 2022-10-17

## 2022-10-17 RX ORDER — OMEPRAZOLE 20 MG/1
20 CAPSULE, DELAYED RELEASE ORAL
Qty: 90 CAPSULE | Refills: 1 | Status: SHIPPED | OUTPATIENT
Start: 2022-10-17

## 2022-10-17 ASSESSMENT — ENCOUNTER SYMPTOMS
DIARRHEA: 0
COUGH: 0
BACK PAIN: 0
WHEEZING: 0
SINUS PAIN: 0
ABDOMINAL PAIN: 0
NAUSEA: 0
SORE THROAT: 0
VOMITING: 0

## 2022-10-17 NOTE — PATIENT INSTRUCTIONS
Chance,    Effexor taper  Take Effexor 37.5 mg once a day for 2 weeks  Then take 37.5 mg once every other day for 1 week  Then take 37.5 mg once every 2 days for 1 week  Then STOP entirely    GERD  Restart Prilosec 20 mg once a day    Scalp  I think this is seborrheic dermatitis. Put kenalog cream on twice a day for 2 weeks then LMK if it doesn't get better    Dr. Yasmeen Mcdaniel:    You may be receiving a survey from [facility] regarding your visit today. Please complete the survey to enable us to provide the highest quality of care to you and your family. If you cannot score us a very good on any question, please call the office to discuss how we could of made your experience a very good one. Thank you.       Clinical Care Team:     Dr. Sho Smith, [facility]      ClericalTeam:     [id number] [facility]

## 2022-10-17 NOTE — PROGRESS NOTES
HPI Notes    Name: Chikis Ziegler  : 2000         Chief Complaint:     Chief Complaint   Patient presents with    Medication Problem     Follow up from medication and     Neurologic Problem     F/u from neuro appt        History of Present Illness:      HPI    This is a 72-year-old man with medical history significant for moderate episode of major depressive disorder, as well as panic attacks and migraine with aura presenting for follow-up for the same conditions. He would also like to discuss GERD, a scalp lesion. MDD: Overall, he has responded very well to treatment with Effexor, and we had scheduled this visit to discuss tapering this medication. Migraine: I had started him on Ubrelvy and referred him to neurology at our most recent office visit. He has since seen neurology, and I did have an opportunity to review their outpatient visit note. He was prescribed Topamax 25 mg p.o. daily, as well as Imitrex 50 mg p.o. as needed for severe headaches. He does have an upcoming follow-up visit with neurology in about 3 weeks for review of a headache diary. At this time, he is reporting that is is adherent to medication changes. GERD: this problem is recurrent and very bothersome for him despite modulating his diet with decreasing carbonated beverage intake, as well as decreased greasy food intake. Symptoms include esophagitis with brackish taste in mouth. Pepcid not helping; Prilosec historically did. Scalp lesion: he is reporting that on the left temporoparietal region there is a scab which is not healing, but not bleeding. No history of trauma. It does itch.      Past Medical History:     Past Medical History:   Diagnosis Date    IBS (irritable bowel syndrome)     Insomnia     Irritable bowel syndrome with diarrhea 2021    Worked up, treated with Xifaxan 2021    Major depressive disorder     Migraine     Panic attacks 2021      Reviewed all health maintenance requirements and ordered appropriate tests  Health Maintenance Due   Topic Date Due    COVID-19 Vaccine (1) Never done    HPV vaccine (1 - Male 2-dose series) Never done    Flu vaccine (1) 08/01/2022    DTaP/Tdap/Td vaccine (7 - Td or Tdap) 08/24/2022       Past Surgical History:     Past Surgical History:   Procedure Laterality Date    TONSILLECTOMY          Medications:       Prior to Admission medications    Medication Sig Start Date End Date Taking? Authorizing Provider   omeprazole (PRILOSEC) 20 MG delayed release capsule Take 1 capsule by mouth every morning (before breakfast) 10/17/22  Yes Leo Pena,    venlafaxine (EFFEXOR XR) 37.5 MG extended release capsule Take 1 capsule by mouth daily 10/17/22  Yes Caprice Botello,    topiramate (TOPAMAX) 25 MG tablet Take 1 tablet by mouth nightly 10/10/22 11/9/22 Yes Virginia Vazquez MD   SUMAtriptan (IMITREX) 50 MG tablet Take 1 tablet by mouth once as needed for Migraine Take a second dose 2 hours later for persistent headache. Not more than 2 tablets a day 10/10/22 10/10/22  Virginia Vazquez MD        Allergies:       Patient has no known allergies. Social History:     Tobacco:    reports that he quit smoking about 4 months ago. His smoking use included cigarettes and e-cigarettes. He has never used smokeless tobacco.  Alcohol:      reports no history of alcohol use. Drug Use:  reports no history of drug use. Family History:     No family history on file. Review of Systems:     Review of Systems   Constitutional:  Negative for fever. HENT:  Negative for sinus pain, sneezing and sore throat. Respiratory:  Negative for cough and wheezing. Cardiovascular:  Positive for chest pain. Gastrointestinal:  Negative for abdominal pain, diarrhea, nausea and vomiting. Musculoskeletal:  Negative for back pain. Skin:  Positive for rash. Hematological:  Negative for adenopathy. Psychiatric/Behavioral:  Negative for sleep disturbance. Physical Exam:     Vitals:  /86 (Site: Right Upper Arm, Position: Sitting, Cuff Size: Large Adult)   Pulse 78   Resp 20   Ht 6' 1\" (1.854 m)   Wt 244 lb 6.4 oz (110.9 kg)   SpO2 98%   BMI 32.24 kg/m²       Physical Exam  Vitals and nursing note reviewed. Constitutional:       General: He is not in acute distress. Appearance: Normal appearance. He is normal weight. Musculoskeletal:         General: No swelling or tenderness. Normal range of motion. Skin:     General: Skin is warm and dry. Capillary Refill: Capillary refill takes less than 2 seconds. Comments: Small 1 cm mildly erythematous flaky skin of the temporoparietal scalp on the left side. No vesicles, not overtly painful. Neurological:      General: No focal deficit present. Mental Status: He is alert and oriented to person, place, and time. Mental status is at baseline. Psychiatric:         Mood and Affect: Mood normal.         Behavior: Behavior normal.         Thought Content:  Thought content normal.               Data:     Lab Results   Component Value Date/Time     08/09/2022 05:45 PM    K 3.9 08/09/2022 05:45 PM     08/09/2022 05:45 PM    CO2 25 08/09/2022 05:45 PM    BUN 15 08/09/2022 05:45 PM    CREATININE 1.08 08/09/2022 05:45 PM    GLUCOSE 86 08/09/2022 05:45 PM    GLUCOSE 93 11/25/2011 01:40 PM    PROT 7.7 08/09/2022 05:45 PM    LABALBU 4.8 08/09/2022 05:45 PM    BILITOT 0.48 08/09/2022 05:45 PM    ALKPHOS 154 08/09/2022 05:45 PM    AST 31 08/09/2022 05:45 PM    ALT 46 08/09/2022 05:45 PM     Lab Results   Component Value Date/Time    WBC 11.5 08/09/2022 05:45 PM    RBC 5.41 08/09/2022 05:45 PM    RBC 4.52 11/25/2011 01:40 PM    HGB 14.4 08/09/2022 05:45 PM    HCT 44.0 08/09/2022 05:45 PM    MCV 81.3 08/09/2022 05:45 PM    MCH 26.7 08/09/2022 05:45 PM    MCHC 32.8 08/09/2022 05:45 PM    RDW 14.2 08/09/2022 05:45 PM     08/09/2022 05:45 PM     11/25/2011 01:40 PM    MPV NOT REPORTED 01/17/2022 09:21 PM     Lab Results   Component Value Date/Time    TSH 2.47 12/25/2021 03:45 AM     Lab Results   Component Value Date/Time    LABA1C 5.2 11/04/2021 08:55 AM          Assessment & Plan        Diagnosis Orders   1. Current moderate episode of major depressive disorder without prior episode (HCC)  venlafaxine (EFFEXOR XR) 37.5 MG extended release capsule      2. Panic attacks  venlafaxine (EFFEXOR XR) 37.5 MG extended release capsule      3. Gastroesophageal reflux disease with esophagitis without hemorrhage  omeprazole (PRILOSEC) 20 MG delayed release capsule      4. Migraine with aura and without status migrainosus, not intractable        5. Seborrheic dermatitis of scalp            We will begin Effexor taper, dropping to 37.5 mg p.o. daily x14 days, then every other day x7 days then every 2 days for 1 week then stop entirely. 3.  Stop Pepcid, resume Prilosec 20 mg p.o. daily, also administer daily multivitamin to guard against vitamin B12 and D depletion. 4.  Continue seeing neurology, continue current therapy. 5.  This appears to be seborrheic dermatitis, would recommend triamcinolone 0.1% cream applied twice daily x14 days, send [employer] message or call if problem does not resolve. Follow-up in 6 months for interval medical visit        Completed Refills   Requested Prescriptions     Signed Prescriptions Disp Refills    omeprazole (PRILOSEC) 20 MG delayed release capsule 90 capsule 1     Sig: Take 1 capsule by mouth every morning (before breakfast)    venlafaxine (EFFEXOR XR) 37.5 MG extended release capsule 30 capsule 3     Sig: Take 1 capsule by mouth daily     Return in about 6 months (around 4/17/2023) for interval medical visit.   Orders Placed This Encounter   Medications    omeprazole (PRILOSEC) 20 MG delayed release capsule     Sig: Take 1 capsule by mouth every morning (before breakfast)     Dispense:  90 capsule     Refill:  1    venlafaxine (EFFEXOR XR) 37.5 MG extended release capsule     Sig: Take 1 capsule by mouth daily     Dispense:  30 capsule     Refill:  3       No orders of the defined types were placed in this encounter. Patient Instructions   Chance,    Effexor taper  Take Effexor 37.5 mg once a day for 2 weeks  Then take 37.5 mg once every other day for 1 week  Then take 37.5 mg once every 2 days for 1 week  Then STOP entirely    GERD  Restart Prilosec 20 mg once a day    Scalp  I think this is seborrheic dermatitis. Put kenalog cream on twice a day for 2 weeks then LMK if it doesn't get better    Dr. Arlyn Mejia:    You may be receiving a survey from [employer] regarding your visit today. Please complete the survey to enable us to provide the highest quality of care to you and your family. If you cannot score us a very good on any question, please call the office to discuss how we could of made your experience a very good one. Thank you.       Clinical Care Team:     Dr. Pat Pompa, URMILA      ClericalTeam:     Sophie Hogan   Electronically signed by Blanche Thomas DO on 10/17/2022 at 10:47 AM           Completed Refills   Requested Prescriptions     Signed Prescriptions Disp Refills    omeprazole (PRILOSEC) 20 MG delayed release capsule 90 capsule 1     Sig: Take 1 capsule by mouth every morning (before breakfast)    venlafaxine (EFFEXOR XR) 37.5 MG extended release capsule 30 capsule 3     Sig: Take 1 capsule by mouth daily

## 2022-11-07 ENCOUNTER — HOSPITAL ENCOUNTER (OUTPATIENT)
Age: 22
Setting detail: SPECIMEN
Discharge: HOME | End: 2022-11-07
Payer: COMMERCIAL

## 2022-11-07 ENCOUNTER — HOSPITAL ENCOUNTER (OUTPATIENT)
Dept: PREADMISSION TESTING | Age: 22
Setting detail: SPECIMEN
Discharge: HOME | End: 2022-11-07
Payer: COMMERCIAL

## 2022-11-07 ENCOUNTER — HOSPITAL ENCOUNTER (OUTPATIENT)
Age: 22
Setting detail: SPECIMEN
End: 2022-11-07
Payer: COMMERCIAL

## 2022-11-07 ENCOUNTER — OFFICE VISIT (OUTPATIENT)
Dept: PRIMARY CARE CLINIC | Age: 22
End: 2022-11-07
Payer: COMMERCIAL

## 2022-11-07 VITALS
BODY MASS INDEX: 34.01 KG/M2 | HEIGHT: 72 IN | WEIGHT: 251.1 LBS | RESPIRATION RATE: 18 BRPM | TEMPERATURE: 97.9 F | HEART RATE: 99 BPM | OXYGEN SATURATION: 100 % | DIASTOLIC BLOOD PRESSURE: 88 MMHG | SYSTOLIC BLOOD PRESSURE: 131 MMHG

## 2022-11-07 DIAGNOSIS — R50.9 FEVER, UNSPECIFIED FEVER CAUSE: ICD-10-CM

## 2022-11-07 DIAGNOSIS — R39.9 UTI SYMPTOMS: ICD-10-CM

## 2022-11-07 DIAGNOSIS — R00.2 PALPITATIONS: Primary | ICD-10-CM

## 2022-11-07 DIAGNOSIS — R68.89 FLU-LIKE SYMPTOMS: ICD-10-CM

## 2022-11-07 LAB
BILIRUBIN, POC: NEGATIVE
BLOOD URINE, POC: NEGATIVE
CLARITY, POC: CLEAR
COLOR, POC: YELLOW
FLU A ANTIGEN: NEGATIVE
FLU B ANTIGEN: NEGATIVE
GLUCOSE URINE, POC: NEGATIVE
KETONES, POC: NEGATIVE
LEUKOCYTE EST, POC: NEGATIVE
NITRITE, POC: NEGATIVE
PH, POC: 6.5
PROTEIN, POC: NEGATIVE
SARS-COV-2, RAPID: NOT DETECTED
SPECIFIC GRAVITY, POC: 1.02
SPECIMEN DESCRIPTION: NORMAL
UROBILINOGEN, POC: 0.2

## 2022-11-07 PROCEDURE — 99213 OFFICE O/P EST LOW 20 MIN: CPT | Performed by: NURSE PRACTITIONER

## 2022-11-07 PROCEDURE — 87635 SARS-COV-2 COVID-19 AMP PRB: CPT

## 2022-11-07 PROCEDURE — 87591 N.GONORRHOEAE DNA AMP PROB: CPT

## 2022-11-07 PROCEDURE — C9803 HOPD COVID-19 SPEC COLLECT: HCPCS

## 2022-11-07 PROCEDURE — 81003 URINALYSIS AUTO W/O SCOPE: CPT | Performed by: NURSE PRACTITIONER

## 2022-11-07 PROCEDURE — 87086 URINE CULTURE/COLONY COUNT: CPT

## 2022-11-07 PROCEDURE — 87491 CHLMYD TRACH DNA AMP PROBE: CPT

## 2022-11-07 PROCEDURE — 87804 INFLUENZA ASSAY W/OPTIC: CPT

## 2022-11-07 ASSESSMENT — ENCOUNTER SYMPTOMS
SORE THROAT: 0
COUGH: 1

## 2022-11-07 NOTE — PROGRESS NOTES
[facility] WALK-IN [facility]  [address] [place] [address]    [place] [id number]  Dept: [phone]  Dept Fax: [phone]    Isidra Hinds is a 25 y.o. male who presents to the [address] [place] [address] in Care today for his medical conditions/complaints as noted below. Sabas Oconnor is c/o ofUrinary Tract Infection (Started a week ago with Lower back pain then Started Thursday or Friday-frequency, fatigue, burning with urination. SOB, Headaches. )      HPI:   HPI    70-year-old male presents to [facility] walk-in clinic with concerns for influenza after experiencing 3 days of fever, congested cough with shortness of breath, body aches and fatigue. Bilateral low back pain last week on Monday; no injury. Does at times will produce pain with certain movements. Dysuria that began on Friday that Sabas describes as \"burning when I pee and the frequency; I feel like I have to pee all the time\". No abdominal pain or nausea and vomiting. He has not noticed any hematuria. Denies penile discharge. Denies trauma to the penis or meatus. There is been no testicular swelling, pain or discoloration. Sabas reports himself is in a monogamous relationship with a female partner. According to Sabas, he is currently trying to wean himself from Effexor. He reports that he is currently taking a dose of 37.5 mg every other day. He is concerned as he has been experiencing \"heart palpitations\" that began last week that seem to have worsened on the days that he is not taking the Effexor. He has followed up with his PCP and felt it is likely due to to the Effexor. He reports that he is weaning himself from the Effexor as he feels that his symptoms have improved and \"just time\".       Past Medical History:   Diagnosis Date    IBS (irritable bowel syndrome)     Insomnia     Irritable bowel syndrome with diarrhea 6/1/2021    Worked up, treated with Xifaxan 6/2021    Major depressive disorder     Migraine     Panic attacks 05/27/2021        Current Outpatient Medications   Medication Sig Dispense Refill    omeprazole (PRILOSEC) 20 MG delayed release capsule Take 1 capsule by mouth every morning (before breakfast) 90 capsule 1    venlafaxine (EFFEXOR XR) 37.5 MG extended release capsule Take 1 capsule by mouth daily 30 capsule 3    topiramate (TOPAMAX) 25 MG tablet Take 1 tablet by mouth nightly (Patient not taking: Reported on 11/7/2022) 30 tablet 1    SUMAtriptan (IMITREX) 50 MG tablet Take 1 tablet by mouth once as needed for Migraine Take a second dose 2 hours later for persistent headache. Not more than 2 tablets a day 9 tablet 1     No current facility-administered medications for this visit. No Known Allergies  Results for orders placed or performed in visit on 11/07/22   POCT Urinalysis No Micro (Auto)   Result Value Ref Range    Color, UA Yellow     Clarity, UA Clear     Glucose, UA POC Negative     Bilirubin, UA Negative     Ketones, UA Negative     Spec Grav, UA 1.025     Blood, UA POC Negative     pH, UA 6.5     Protein, UA POC Negative     Urobilinogen, UA 0.2     Leukocytes, UA Negative     Nitrite, UA Negative          Subjective:      Review of Systems   Constitutional:  Positive for fever. HENT:  Positive for sneezing. Negative for sore throat. Respiratory:  Positive for cough. Cardiovascular:  Positive for palpitations. Genitourinary:  Positive for dysuria, flank pain and frequency. Negative for genital sores, hematuria, penile discharge, penile pain, penile swelling, scrotal swelling, testicular pain and urgency. Skin: Negative. Negative for rash. Objective:     Physical Exam  Vitals and nursing note reviewed. Constitutional:       Comments: Appears to be of stated age with warm, dry skin; normal coloration without rash of the exposed skin. Patient is well-appearing, well-hydrated, and appears nontoxic without apparent distress.      HENT: Head: Normocephalic. Mouth/Throat:      Lips: Pink. Mouth: Mucous membranes are moist.      Pharynx: Uvula midline. Cardiovascular:      Rate and Rhythm: Normal rate and regular rhythm. Pulses: Normal pulses. Heart sounds: Normal heart sounds. Pulmonary:      Effort: Pulmonary effort is normal.      Breath sounds: Normal breath sounds. /88 (Site: Right Upper Arm, Position: Sitting, Cuff Size: Large Adult)   Pulse 99   Temp 97.9 °F (36.6 °C) (Oral)   Resp 18   Ht 6' (1.829 m)   Wt 251 lb 1.6 oz (113.9 kg)   SpO2 100%   BMI 34.06 kg/m²   Results for orders placed or performed in visit on 11/07/22   POCT Urinalysis No Micro (Auto)   Result Value Ref Range    Color, UA Yellow     Clarity, UA Clear     Glucose, UA POC Negative     Bilirubin, UA Negative     Ketones, UA Negative     Spec Grav, UA 1.025     Blood, UA POC Negative     pH, UA 6.5     Protein, UA POC Negative     Urobilinogen, UA 0.2     Leukocytes, UA Negative     Nitrite, UA Negative      Assessment:      Diagnosis Orders   1. Palpitations        2. Flu-like symptoms  Rapid Influenza A/B Antigens      3. Fever, unspecified fever cause  Rapid Influenza A/B Antigens      4. UTI symptoms  POCT Urinalysis No Micro (Auto)    Culture, Urine    Chlamydia/GC DNA, Urine          Plan:     - COVID-19 and influenza testing today; this office will call with results. - Discussed further work-up including EKG, CBC, TSH and BMP; Chance wishes to wait on a COVID-19 and influenza results. - Point-of-care UA without nitrites, leukocytes or blood; will send for urine culture and chlamydia/gonorrhea testing.  - Follow up with PCP as scheduled. ED for worsening or concerns. Phone consultation with PCP Dr. Miguel Leonardo, will hold EKG, CBC, THS and BMP at this time and he will further work-up/manage. No follow-ups on file. No orders of the defined types were placed in this encounter.          Electronically signed by Noy Ramirez Luis Daniel Garcia - CNP on 11/7/2022 at 3:17 PM

## 2022-11-07 NOTE — PATIENT INSTRUCTIONS
SURVEY:    You may be receiving a survey from [employer] regarding your visit today. Please complete the survey to enable us to provide the highest quality of care to you and your family. If you cannot score us a very good on any question, please call the office to discuss how we could of made your experience a very good one. Thank you for letting us take care of you today. We hope all your questions were addressed. If a question was overlooked or something else comes to mind after you return home, please contact a member of your Care Team listed below.     Thank you,  Nikki Mendoza MA      Your Care Team at [facility]  Provider- LILLI Marley  Provider- LILLI Felix  [address] [address]  Reception- [place], [place]      Walk-in contact numbers:       Phone: [phone]                 Fax: [phone]    [place] Hours:  Mon-Thurs: 9:00 am - 5:30 pm     Friday: 8:00 am - 12:00 pm           Sat-Sun: CLOSED

## 2022-11-08 ENCOUNTER — HOSPITAL ENCOUNTER (EMERGENCY)
Age: 22
Discharge: HOME | End: 2022-11-08
Attending: EMERGENCY MEDICINE
Payer: COMMERCIAL

## 2022-11-08 ENCOUNTER — APPOINTMENT (OUTPATIENT)
Dept: CT IMAGING | Age: 22
End: 2022-11-08
Payer: COMMERCIAL

## 2022-11-08 VITALS
TEMPERATURE: 98.1 F | DIASTOLIC BLOOD PRESSURE: 90 MMHG | HEART RATE: 118 BPM | OXYGEN SATURATION: 100 % | WEIGHT: 251.7 LBS | HEIGHT: 72 IN | SYSTOLIC BLOOD PRESSURE: 150 MMHG | BODY MASS INDEX: 34.09 KG/M2 | RESPIRATION RATE: 18 BRPM

## 2022-11-08 DIAGNOSIS — R10.9 RIGHT FLANK PAIN: Primary | ICD-10-CM

## 2022-11-08 DIAGNOSIS — M54.9 MUSCULOSKELETAL BACK PAIN: ICD-10-CM

## 2022-11-08 DIAGNOSIS — R10.9 FLANK PAIN: ICD-10-CM

## 2022-11-08 LAB
ABSOLUTE EOS #: 0.3 K/UL (ref 0–0.4)
ABSOLUTE LYMPH #: 3.7 K/UL (ref 1–4.8)
ABSOLUTE MONO #: 0.9 K/UL (ref 0–1)
ALBUMIN SERPL-MCNC: 4.8 G/DL (ref 3.5–5.2)
ALP BLD-CCNC: 162 U/L (ref 40–129)
ALT SERPL-CCNC: 36 U/L (ref 5–41)
ANION GAP SERPL CALCULATED.3IONS-SCNC: 11 MMOL/L (ref 9–17)
AST SERPL-CCNC: 28 U/L
BASOPHILS # BLD: 0 % (ref 0–2)
BASOPHILS ABSOLUTE: 0 K/UL (ref 0–0.2)
BILIRUB SERPL-MCNC: 0.4 MG/DL (ref 0.3–1.2)
BILIRUBIN URINE: NEGATIVE
BUN BLDV-MCNC: 15 MG/DL (ref 6–20)
BUN/CREAT BLD: 12 (ref 9–20)
C. TRACHOMATIS DNA ,URINE: NEGATIVE
CALCIUM SERPL-MCNC: 9.9 MG/DL (ref 8.6–10.4)
CHLORIDE BLD-SCNC: 102 MMOL/L (ref 98–107)
CO2: 29 MMOL/L (ref 20–31)
COLOR: YELLOW
COMMENT UA: ABNORMAL
CREAT SERPL-MCNC: 1.22 MG/DL (ref 0.7–1.2)
CULTURE: NORMAL
DIFFERENTIAL TYPE: YES
EOSINOPHILS RELATIVE PERCENT: 3 % (ref 0–5)
GFR SERPL CREATININE-BSD FRML MDRD: >60 ML/MIN/1.73M2
GLUCOSE BLD-MCNC: 108 MG/DL (ref 70–99)
GLUCOSE URINE: NEGATIVE
HCT VFR BLD CALC: 43 % (ref 41–53)
HEMOGLOBIN: 14.6 G/DL (ref 13.5–17.5)
KETONES, URINE: NEGATIVE
LEUKOCYTE ESTERASE, URINE: NEGATIVE
LIPASE: 35 U/L (ref 13–60)
LYMPHOCYTES # BLD: 32 % (ref 13–44)
MCH RBC QN AUTO: 27.2 PG (ref 26–34)
MCHC RBC AUTO-ENTMCNC: 33.8 G/DL (ref 31–37)
MCV RBC AUTO: 80.3 FL (ref 80–100)
MONOCYTES # BLD: 7 % (ref 5–9)
N. GONORRHOEAE DNA, URINE: NEGATIVE
NITRITE, URINE: NEGATIVE
PDW BLD-RTO: 14.7 % (ref 12.1–15.2)
PH UA: 6 (ref 5–8)
PLATELET # BLD: 434 K/UL (ref 140–450)
POTASSIUM SERPL-SCNC: 4 MMOL/L (ref 3.7–5.3)
PROTEIN UA: ABNORMAL
RBC # BLD: 5.36 M/UL (ref 4.5–5.9)
SEG NEUTROPHILS: 58 % (ref 39–75)
SEGMENTED NEUTROPHILS ABSOLUTE COUNT: 6.7 K/UL (ref 2.1–6.5)
SODIUM BLD-SCNC: 142 MMOL/L (ref 135–144)
SPECIFIC GRAVITY UA: 1.02 (ref 1–1.03)
SPECIMEN DESCRIPTION: NORMAL
SPECIMEN DESCRIPTION: NORMAL
TOTAL PROTEIN: 8.2 G/DL (ref 6.4–8.3)
TURBIDITY: CLEAR
URINE HGB: NEGATIVE
UROBILINOGEN, URINE: NORMAL
WBC # BLD: 11.6 K/UL (ref 3.5–11)

## 2022-11-08 PROCEDURE — 99284 EMERGENCY DEPT VISIT MOD MDM: CPT

## 2022-11-08 PROCEDURE — 74176 CT ABD & PELVIS W/O CONTRAST: CPT

## 2022-11-08 PROCEDURE — 85025 COMPLETE CBC W/AUTO DIFF WBC: CPT

## 2022-11-08 PROCEDURE — 81003 URINALYSIS AUTO W/O SCOPE: CPT

## 2022-11-08 PROCEDURE — 83690 ASSAY OF LIPASE: CPT

## 2022-11-08 PROCEDURE — 80053 COMPREHEN METABOLIC PANEL: CPT

## 2022-11-08 ASSESSMENT — PAIN DESCRIPTION - PAIN TYPE: TYPE: ACUTE PAIN

## 2022-11-08 ASSESSMENT — PAIN DESCRIPTION - ORIENTATION: ORIENTATION: RIGHT

## 2022-11-08 ASSESSMENT — PAIN DESCRIPTION - LOCATION: LOCATION: FLANK

## 2022-11-08 ASSESSMENT — PAIN DESCRIPTION - DESCRIPTORS: DESCRIPTORS: SHARP;ACHING

## 2022-11-08 ASSESSMENT — PAIN SCALES - GENERAL: PAINLEVEL_OUTOF10: 7

## 2022-11-08 ASSESSMENT — PAIN - FUNCTIONAL ASSESSMENT: PAIN_FUNCTIONAL_ASSESSMENT: 0-10

## 2022-11-08 NOTE — Clinical Note
Annemarie Wood was seen and treated in our emergency department on 11/8/2022. He may return to work on 11/10/2022. If you have any questions or concerns, please don't hesitate to call.       Juliana Diaz, DO

## 2022-11-08 NOTE — DISCHARGE INSTRUCTIONS
Avoid nonsteroidal anti-inflammatory drugs such as Motrin or Aleve. Sure you make an appointment with your primary care physician soon to have the elevated creatinine and your blood pressure evaluated. You can use Tylenol for discomfort and apply heating pad. And if worse.

## 2022-11-08 NOTE — ED PROVIDER NOTES
[facility] ED  EMERGENCY DEPARTMENT ENCOUNTER      Pt Name: Daiana Brownlee  MRN: [id number]  [place] 2000  Date of evaluation: 11/8/2022  Provider: Pamela Faust       Chief Complaint   Patient presents with    Flank Pain     Right sided flank pain for past week   Right-sided flank pain for few days. Fever or chills but he has dysuria frequency and urgency. HISTORY OF PRESENT ILLNESS   (Location/Symptom, Timing/Onset, Context/Setting, Quality, Duration, Modifying Factors, Severity)  Note limiting factors. Daiana Brownlee is a 25 y.o. male who presents to the emergency department      HPI  58-year-old male with history of childhood urinary tract infections is complaining of dysuria, urgency and hesitancy without hematuria. She also has right-sided flank pain that is modifiable with motion. Patient denies fever chills or productive cough. She was seen yesterday and had an evaluation that included a urinalysis and STD screens that were negative. Nursing Notes were reviewed. REVIEW OF SYSTEMS    (2-9 systems for level 4, 10 or more for level 5)     Review of Systems  Generally patient denies fever chills but he is generally fatigued. Pulmonary no productive cough  Vascular no syncope  Musculoskeletal some back discomfort with range of motion  GI-denies bloody stools or melena and no vomiting but has been nauseated  Urinary see chief complaint    Except as noted above the remainder of the review of systems was reviewed and negative.        PAST MEDICAL HISTORY     Past Medical History:   Diagnosis Date    IBS (irritable bowel syndrome)     Insomnia     Irritable bowel syndrome with diarrhea 6/1/2021    Worked up, treated with Xifaxan 6/2021    Major depressive disorder     Migraine     Panic attacks 05/27/2021         SURGICAL HISTORY       Past Surgical History:   Procedure Laterality Date    TONSILLECTOMY           CURRENT MEDICATIONS       Discharge Medication List as of 2022  5:52 PM        CONTINUE these medications which have NOT CHANGED    Details   omeprazole (PRILOSEC) 20 MG delayed release capsule Take 1 capsule by mouth every morning (before breakfast), Disp-90 capsule, R-1Normal      venlafaxine (EFFEXOR XR) 37.5 MG extended release capsule Take 1 capsule by mouth daily, Disp-30 capsule, R-3Normal      SUMAtriptan (IMITREX) 50 MG tablet Take 1 tablet by mouth once as needed for Migraine Take a second dose 2 hours later for persistent headache. Not more than 2 tablets a day, Disp-9 tablet, R-1Normal      topiramate (TOPAMAX) 25 MG tablet Take 1 tablet by mouth nightly, Disp-30 tablet, R-1Normal             ALLERGIES     Patient has no known allergies. FAMILY HISTORY     History reviewed. No pertinent family history.        SOCIAL HISTORY       Social History     Socioeconomic History    Marital status: Single     Spouse name: None    Number of children: None    Years of education: None    Highest education level: None   Tobacco Use    Smoking status: Former     Types: Cigarettes, E-Cigarettes     Quit date: 2022     Years since quittin.4    Smokeless tobacco: Never   Vaping Use    Vaping Use: Former    Substances: Never   Substance and Sexual Activity    Alcohol use: No    Drug use: No     Social Determinants of Health     Financial Resource Strain: Low Risk     Difficulty of Paying Living Expenses: Not hard at all   Food Insecurity: No Food Insecurity    Worried About [address] in the Last Year: Never true    [facility] in the Last Year: Never true       SCREENINGS         [place] Coma Scale  Eye Opening: Spontaneous  Best Verbal Response: Oriented  Best Motor Response: Obeys commands  [place] Coma Scale Score: 15                     CIWA Assessment  BP: (!) 150/90  Heart Rate: (!) 118                 PHYSICAL EXAM    (up to 7 for level 4, 8 or more for level 5)     ED Triage Vitals [22 1455]   BP Temp Temp Source Heart Rate Resp SpO2 Height Weight   (!) 166/109 98.1 °F (36.7 °C) Oral (!) 118 18 99 % 6' (1.829 m) 251 lb 11.2 oz (114.2 kg)       Physical Exam  Generally he is awake and alert and no acute distress  Pulmonary lungs are clear  Abdomen soft nontender no rebound or guard  Back is nontender and there is no flank tenderness with percussion  Range of motion triggers the right flank pain. Skin is warm and dry. DIAGNOSTIC RESULTS     EKG: All EKG's are interpreted by the Emergency Department Physician who either signs or Co-signs this chart in the absence of a cardiologist.        RADIOLOGY:   Non-plain film images such as CT, Ultrasound and MRI are read by the radiologist. Plain radiographic images are visualized and preliminarily interpreted by the emergency physician with the below findings:    CT scan was generally unremarkable with renal lithiasis or appendicitis and solid organs were unremarkable and I reviewed the scan I saw no inflammation or wall thickening of the gallbladder and no obvious gallstones. Interpretation per the Radiologist below, if available at the time of this note:    CT ABDOMEN PELVIS WO CONTRAST Additional Contrast? None   Final Result      Negative. ED BEDSIDE ULTRASOUND:   Performed by ED Physician - none    LABS:  Labs Reviewed   CBC WITH AUTO DIFFERENTIAL - Abnormal; Notable for the following components:       Result Value    WBC 11.6 (*)     Segs Absolute 6.70 (*)     All other components within normal limits   COMPREHENSIVE METABOLIC PANEL - Abnormal; Notable for the following components:    Glucose 108 (*)     Creatinine 1.22 (*)     Alkaline Phosphatase 162 (*)     All other components within normal limits   URINALYSIS - Abnormal; Notable for the following components:    Protein, UA TRACE (*)     All other components within normal limits   LIPASE       All other labs were within normal range or not returned as of this dictation.     EMERGENCY DEPARTMENT COURSE and DIFFERENTIAL DIAGNOSIS/MDM:   Vitals:    Vitals:    11/08/22 1455 11/08/22 1500 11/08/22 1756   BP: (!) 166/109 (!) 143/92 (!) 150/90   Pulse: (!) 118     Resp: 18     Temp: 98.1 °F (36.7 °C)     TempSrc: Oral     SpO2: 99% 100%    Weight: 251 lb 11.2 oz (114.2 kg)     Height: 6' (1.829 m)             MDM        REASSESSMENT   No obvious source of discomfort and the fact that his pain is modified with movement this makes the musculoskeletal pain unlikely source of his discomfort. Fortunately his creatinine is 1.22 which is slightly elevated but for his age is surprising and patient needs further evaluation. Blood pressure is also moderately elevated. Patient has no endorgan injury but certainly this needs follow-up and repeat pressure evaluation here in the emergency department. Urinalysis is completely normal and I would not begin antibiotics at this time. CRITICAL CARE TIME   Total Critical Care time was  minutes, excluding separately reportable procedures. There was a high probability of clinically significant/life threatening deterioration in the patient's condition which required my urgent intervention. CONSULTS:  None    PROCEDURES:  Unless otherwise noted below, none     Procedures      FINAL IMPRESSION      1. Right flank pain Stable   2. Musculoskeletal back pain Stable   3. Flank pain Stable         DISPOSITION/PLAN   DISPOSITION Decision To Discharge 11/08/2022 05:50:44 PM      PATIENT REFERRED TO:  Ella Schilling DO  [address] [place] [address] O [id number] [phone]          DISCHARGE MEDICATIONS:  Discharge Medication List as of 11/8/2022  5:52 PM        Controlled Substances Monitoring:     RX Monitoring 2/18/2022   Periodic Controlled Substance Monitoring No signs of potential drug abuse or diversion identified.            Summation      Patient Course:     ED Medications administered this visit:  Medications - No data to display    New Prescriptions from this visit: Discharge Medication List as of 11/8/2022  5:52 PM          Follow-up:  Atilio Quintero DO  [address] [facility],  [address]  [phone]            Final Impression:   1. Right flank pain Stable   2. Musculoskeletal back pain Stable   3.  Flank pain Stable                (Please note that portions of this note were completed with a voice recognition program.  Efforts were made to edit the dictations but occasionally words are mis-transcribed.)    Graham Miller DO (electronically signed)  Attending Emergency Physician           Shakir Mercado DO  11/08/22 [address] [place] [address]  11/21/22 8106

## 2022-11-10 ENCOUNTER — OFFICE VISIT (OUTPATIENT)
Dept: FAMILY MEDICINE CLINIC | Age: 22
End: 2022-11-10
Payer: COMMERCIAL

## 2022-11-10 VITALS
HEART RATE: 104 BPM | OXYGEN SATURATION: 97 % | DIASTOLIC BLOOD PRESSURE: 86 MMHG | HEIGHT: 73 IN | RESPIRATION RATE: 20 BRPM | SYSTOLIC BLOOD PRESSURE: 130 MMHG | BODY MASS INDEX: 33.37 KG/M2 | WEIGHT: 251.8 LBS

## 2022-11-10 DIAGNOSIS — R80.9 ALBUMINURIA: ICD-10-CM

## 2022-11-10 DIAGNOSIS — R10.9 FLANK PAIN: Primary | ICD-10-CM

## 2022-11-10 LAB
CREATININE URINE POCT: 300
MICROALBUMIN/CREAT 24H UR: 80 MG/G{CREAT}
MICROALBUMIN/CREAT UR-RTO: 0.26

## 2022-11-10 PROCEDURE — 99213 OFFICE O/P EST LOW 20 MIN: CPT | Performed by: STUDENT IN AN ORGANIZED HEALTH CARE EDUCATION/TRAINING PROGRAM

## 2022-11-10 PROCEDURE — 82044 UR ALBUMIN SEMIQUANTITATIVE: CPT | Performed by: STUDENT IN AN ORGANIZED HEALTH CARE EDUCATION/TRAINING PROGRAM

## 2022-11-10 RX ORDER — ONDANSETRON 4 MG/1
4 TABLET, FILM COATED ORAL
COMMUNITY
Start: 2022-11-09

## 2022-11-10 RX ORDER — DICYCLOMINE HYDROCHLORIDE 10 MG/1
10 CAPSULE ORAL
COMMUNITY
Start: 2022-11-09 | End: 2022-11-16

## 2022-11-10 ASSESSMENT — PATIENT HEALTH QUESTIONNAIRE - PHQ9
SUM OF ALL RESPONSES TO PHQ QUESTIONS 1-9: 0
8. MOVING OR SPEAKING SO SLOWLY THAT OTHER PEOPLE COULD HAVE NOTICED. OR THE OPPOSITE, BEING SO FIGETY OR RESTLESS THAT YOU HAVE BEEN MOVING AROUND A LOT MORE THAN USUAL: 0
2. FEELING DOWN, DEPRESSED OR HOPELESS: 0
6. FEELING BAD ABOUT YOURSELF - OR THAT YOU ARE A FAILURE OR HAVE LET YOURSELF OR YOUR FAMILY DOWN: 0
SUM OF ALL RESPONSES TO PHQ QUESTIONS 1-9: 0
3. TROUBLE FALLING OR STAYING ASLEEP: 0
SUM OF ALL RESPONSES TO PHQ QUESTIONS 1-9: 0
9. THOUGHTS THAT YOU WOULD BE BETTER OFF DEAD, OR OF HURTING YOURSELF: 0
7. TROUBLE CONCENTRATING ON THINGS, SUCH AS READING THE NEWSPAPER OR WATCHING TELEVISION: 0
SUM OF ALL RESPONSES TO PHQ9 QUESTIONS 1 & 2: 0
10. IF YOU CHECKED OFF ANY PROBLEMS, HOW DIFFICULT HAVE THESE PROBLEMS MADE IT FOR YOU TO DO YOUR WORK, TAKE CARE OF THINGS AT HOME, OR GET ALONG WITH OTHER PEOPLE: 0
5. POOR APPETITE OR OVEREATING: 0
SUM OF ALL RESPONSES TO PHQ QUESTIONS 1-9: 0
4. FEELING TIRED OR HAVING LITTLE ENERGY: 0
1. LITTLE INTEREST OR PLEASURE IN DOING THINGS: 0

## 2022-11-10 ASSESSMENT — ENCOUNTER SYMPTOMS
VOMITING: 0
DIARRHEA: 0
BACK PAIN: 0
COUGH: 0
NAUSEA: 0
ABDOMINAL PAIN: 0
WHEEZING: 0
SINUS PAIN: 0
SORE THROAT: 0

## 2022-11-10 NOTE — PATIENT INSTRUCTIONS
SURVEY:    You may be receiving a survey from [employer] regarding your visit today. Please complete the survey to enable us to provide the highest quality of care to you and your family. If you cannot score us a very good on any question, please call the office to discuss how we could of made your experience a very good one. Thank you.       Clinical Care Team:     Dr. Won Crockett URMILA      ClericalTeam:     [id number] [facility]

## 2022-11-10 NOTE — PROGRESS NOTES
HPI Notes    Name: Ever Payne  : 2000         Chief Complaint:     Chief Complaint   Patient presents with    ED Follow-up     Ed follow up, Flank pain for 72 hours  discuss creatine and protein in urine       History of Present Illness:      HPI    This is a 25year old man presenting for evaluation of exceptionally bothersome flank pain for the past 72 hours. He has been seen in the ER for this problem; I did review that documentation. Workup was significant for proteinuria, elevated Scr with normal eGFR, reassuring CT of the abdomen/pelvis with overall normal morphology of the kidneys. Pain has improved with ketorolac administration in the ER. Past Medical History:     Past Medical History:   Diagnosis Date    IBS (irritable bowel syndrome)     Insomnia     Irritable bowel syndrome with diarrhea 2021    Worked up, treated with Xifaxan 2021    Major depressive disorder     Migraine     Panic attacks 2021      Reviewed all health maintenance requirements and ordered appropriate tests  Health Maintenance Due   Topic Date Due    COVID-19 Vaccine (1) Never done    HPV vaccine (1 - Male 2-dose series) Never done    Flu vaccine (1) 2022    DTaP/Tdap/Td vaccine (7 - Td or Tdap) 2022       Past Surgical History:     Past Surgical History:   Procedure Laterality Date    TONSILLECTOMY          Medications:       Prior to Admission medications    Medication Sig Start Date End Date Taking?  Authorizing Provider   ondansetron (ZOFRAN) 4 MG tablet Take 4 mg by mouth 22  Yes Historical Provider, MD   omeprazole (PRILOSEC) 20 MG delayed release capsule Take 1 capsule by mouth every morning (before breakfast) 10/17/22  Yes Adolph Ventura DO   venlafaxine (EFFEXOR XR) 37.5 MG extended release capsule Take 1 capsule by mouth daily 10/17/22  Yes Wes Pena DO   SUMAtriptan (IMITREX) 50 MG tablet Take 1 tablet by mouth once as needed for Migraine Take a second dose 2 hours later for persistent headache. Not more than 2 tablets a day 10/10/22 11/10/22 Yes Xin Blackburn MD   dicyclomine (BENTYL) 10 MG capsule Take 10 mg by mouth  Patient not taking: Reported on 11/10/2022 11/9/22 11/16/22  Historical Provider, MD        Allergies:       Patient has no known allergies. Social History:     Tobacco:    reports that he quit smoking about 4 months ago. His smoking use included cigarettes and e-cigarettes. He has never used smokeless tobacco.  Alcohol:      reports no history of alcohol use. Drug Use:  reports no history of drug use. Family History:     No family history on file. Review of Systems:         Review of Systems   Constitutional:  Negative for chills and fever. HENT:  Negative for sinus pain, sneezing and sore throat. Respiratory:  Negative for cough and wheezing. Cardiovascular:  Negative for chest pain. Gastrointestinal:  Negative for abdominal pain, diarrhea, nausea and vomiting. Genitourinary:  Positive for flank pain. Negative for decreased urine volume, difficulty urinating, dysuria, frequency, hematuria and penile discharge. Musculoskeletal:  Negative for back pain. Skin:  Negative for rash. Hematological:  Negative for adenopathy. Psychiatric/Behavioral:  Negative for sleep disturbance. Physical Exam:     Vitals:  /86 (Site: Right Upper Arm, Position: Sitting, Cuff Size: Large Adult)   Pulse (!) 104   Resp 20   Ht 6' 1\" (1.854 m)   Wt 251 lb 12.8 oz (114.2 kg)   SpO2 97%   BMI 33.22 kg/m²       Physical Exam  Vitals and nursing note reviewed. Constitutional:       General: He is not in acute distress. Appearance: Normal appearance. He is normal weight. HENT:      Right Ear: Tympanic membrane, ear canal and external ear normal. There is no impacted cerumen. Left Ear: Tympanic membrane, ear canal and external ear normal. There is no impacted cerumen. Nose: Nose normal. No congestion.       Mouth/Throat: Mouth: Mucous membranes are moist.      Pharynx: Oropharynx is clear. No oropharyngeal exudate. Eyes:      General: No scleral icterus. Conjunctiva/sclera: Conjunctivae normal.      Pupils: Pupils are equal, round, and reactive to light. Cardiovascular:      Rate and Rhythm: Normal rate and regular rhythm. Pulses: Normal pulses. Heart sounds: Normal heart sounds. No murmur heard. Pulmonary:      Effort: Pulmonary effort is normal. No respiratory distress. Breath sounds: Normal breath sounds. No wheezing. Abdominal:      General: Abdomen is flat. Bowel sounds are normal.      Palpations: Abdomen is soft. Tenderness: There is no abdominal tenderness. Musculoskeletal:         General: Normal range of motion. Cervical back: Normal range of motion and neck supple. No rigidity. No muscular tenderness. Lymphadenopathy:      Cervical: No cervical adenopathy. Skin:     General: Skin is warm and dry. Capillary Refill: Capillary refill takes less than 2 seconds. Neurological:      General: No focal deficit present. Mental Status: He is alert and oriented to person, place, and time. Mental status is at baseline. Psychiatric:         Mood and Affect: Mood normal.         Behavior: Behavior normal.         Thought Content:  Thought content normal.               Data:     Lab Results   Component Value Date/Time     11/08/2022 03:00 PM    K 4.0 11/08/2022 03:00 PM     11/08/2022 03:00 PM    CO2 29 11/08/2022 03:00 PM    BUN 15 11/08/2022 03:00 PM    CREATININE 1.22 11/08/2022 03:00 PM    GLUCOSE 108 11/08/2022 03:00 PM    GLUCOSE 93 11/25/2011 01:40 PM    PROT 8.2 11/08/2022 03:00 PM    LABALBU 4.8 11/08/2022 03:00 PM    BILITOT 0.4 11/08/2022 03:00 PM    ALKPHOS 162 11/08/2022 03:00 PM    AST 28 11/08/2022 03:00 PM    ALT 36 11/08/2022 03:00 PM     Lab Results   Component Value Date/Time    WBC 11.6 11/08/2022 03:00 PM    RBC 5.36 11/08/2022 03:00 PM    RBC 4.52 11/25/2011 01:40 PM    HGB 14.6 11/08/2022 03:00 PM    HCT 43.0 11/08/2022 03:00 PM    MCV 80.3 11/08/2022 03:00 PM    MCH 27.2 11/08/2022 03:00 PM    MCHC 33.8 11/08/2022 03:00 PM    RDW 14.7 11/08/2022 03:00 PM     11/08/2022 03:00 PM     11/25/2011 01:40 PM    MPV NOT REPORTED 01/17/2022 09:21 PM     Lab Results   Component Value Date/Time    TSH 2.47 12/25/2021 03:45 AM     Lab Results   Component Value Date/Time    LABA1C 5.2 11/04/2021 08:55 AM          Assessment & Plan        Diagnosis Orders   1. Flank pain  Protein, Urine, Timed    Kappa/Lambda Light Chains, Urine, 24 Hour    Protein Electrophoresis, Urine    External Referral To Nephrology    POCT microalbumin      2. Albuminuria  Protein, Urine, Timed    Kappa/Lambda Light Chains, Urine, 24 Hour    Protein Electrophoresis, Urine    External Referral To Nephrology    POCT microalbumin          POCT micro is significant for abnormal albuminuria today. Will recommend 24 hour urine protein to quantify proteinuria as well as obtain electrophoresis, kappa/lambda light chains. Will refer to nephrology for assistance in workup, management. Completed Refills   Requested Prescriptions      No prescriptions requested or ordered in this encounter     Return if symptoms worsen or fail to improve. No orders of the defined types were placed in this encounter.     Orders Placed This Encounter   Procedures    Protein, Urine, Timed     Standing Status:   Future     Standing Expiration Date:   11/10/2023    Benedict/Lambda Light Chains, Urine, 24 Hour     Standing Status:   Future     Standing Expiration Date:   11/10/2023    Protein Electrophoresis, Urine     Standing Status:   Future     Standing Expiration Date:   11/10/2023    External Referral To Nephrology     Referral Priority:   Routine     Referral Type:   Eval and Treat     Referral Reason:   Specialty Services Required     Referred to Provider:   Bhaskar Shelton MD     Requested

## 2022-11-11 ENCOUNTER — HOSPITAL ENCOUNTER (OUTPATIENT)
Age: 22
Discharge: HOME | End: 2022-11-11
Payer: COMMERCIAL

## 2022-11-11 DIAGNOSIS — R10.9 FLANK PAIN: ICD-10-CM

## 2022-11-11 DIAGNOSIS — R80.9 ALBUMINURIA: ICD-10-CM

## 2022-11-11 PROCEDURE — 84166 PROTEIN E-PHORESIS/URINE/CSF: CPT

## 2022-11-11 PROCEDURE — 84156 ASSAY OF PROTEIN URINE: CPT

## 2022-11-14 ENCOUNTER — TELEPHONE (OUTPATIENT)
Dept: FAMILY MEDICINE CLINIC | Age: 22
End: 2022-11-14

## 2022-11-14 NOTE — TELEPHONE ENCOUNTER
Star Arias from the lab called to clarify on lab protein electrophoresis, is that a clean catch or 24 hour urine. Called Dr. Laney Cota he stated 24 hours urine.  Ricardo Smith

## 2022-11-15 LAB
P E INTERPRETATION, U: NORMAL
PATHOLOGIST: NORMAL
SPECIMEN TYPE: NORMAL
URINE TOTAL PROTEIN: 9 MG/DL

## 2022-11-16 ENCOUNTER — HOSPITAL ENCOUNTER (OUTPATIENT)
Age: 22
Setting detail: SPECIMEN
Discharge: HOME | End: 2022-11-16
Payer: COMMERCIAL

## 2022-11-16 PROCEDURE — 84156 ASSAY OF PROTEIN URINE: CPT

## 2022-11-16 PROCEDURE — 83520 IMMUNOASSAY QUANT NOS NONAB: CPT

## 2022-11-16 PROCEDURE — 84166 PROTEIN E-PHORESIS/URINE/CSF: CPT

## 2022-11-16 PROCEDURE — 81050 URINALYSIS VOLUME MEASURE: CPT

## 2022-11-17 LAB
HOURS COLLECTED: 24 H
PROTEIN 24 HOUR URINE: 137 MG/24 H
URINE TOTAL PROTEIN: 13 MG/DL
VOLUME: 1050 ML

## 2022-11-18 LAB
FREE KAPPA LT CHAINS,UR: 17.93 MG/L (ref 0–32.9)
FREE LAMBDA LT CHAINS,UR: 3.21 MG/L (ref 0–3.79)
FREE UR LAMBDA EXCRETION/DAY: 3.37 MG/D
HOURS COLLECTED: 24
P E INTERPRETATION, U: NORMAL
PATHOLOGIST: NORMAL
PROTEIN, TOTAL URINE: 40 MG/D
SPECIMEN TYPE: 24
URINE FREE KAPPA EXCRETION/DAY: 18.83 MG/D
URINE TOTAL PROTEIN: 12 MG/DL
URINE VOLUME: 1050

## 2022-12-15 ENCOUNTER — TELEPHONE (OUTPATIENT)
Dept: FAMILY MEDICINE CLINIC | Age: 22
End: 2022-12-15

## 2022-12-15 NOTE — TELEPHONE ENCOUNTER
FYI  Patient was referred to [facility] on 11/10/22  You were sending patient due to flank pain and albuminuria  's office called and said they have called the patient four times since referral placed. No response from patient    If you need patient to go you will need to put in new referral, they are closing the old one.

## 2023-01-25 ENCOUNTER — OFFICE VISIT (OUTPATIENT)
Dept: FAMILY MEDICINE CLINIC | Age: 23
End: 2023-01-25
Payer: COMMERCIAL

## 2023-01-25 VITALS
HEART RATE: 83 BPM | WEIGHT: 256 LBS | SYSTOLIC BLOOD PRESSURE: 124 MMHG | OXYGEN SATURATION: 99 % | BODY MASS INDEX: 33.78 KG/M2 | DIASTOLIC BLOOD PRESSURE: 80 MMHG

## 2023-01-25 DIAGNOSIS — G43.109 MIGRAINE WITH AURA AND WITHOUT STATUS MIGRAINOSUS, NOT INTRACTABLE: Primary | ICD-10-CM

## 2023-01-25 PROCEDURE — 99214 OFFICE O/P EST MOD 30 MIN: CPT | Performed by: STUDENT IN AN ORGANIZED HEALTH CARE EDUCATION/TRAINING PROGRAM

## 2023-01-25 ASSESSMENT — PATIENT HEALTH QUESTIONNAIRE - PHQ9
6. FEELING BAD ABOUT YOURSELF - OR THAT YOU ARE A FAILURE OR HAVE LET YOURSELF OR YOUR FAMILY DOWN: 0
SUM OF ALL RESPONSES TO PHQ QUESTIONS 1-9: 0
SUM OF ALL RESPONSES TO PHQ QUESTIONS 1-9: 0
2. FEELING DOWN, DEPRESSED OR HOPELESS: 0
SUM OF ALL RESPONSES TO PHQ QUESTIONS 1-9: 0
SUM OF ALL RESPONSES TO PHQ9 QUESTIONS 1 & 2: 0
5. POOR APPETITE OR OVEREATING: 0
8. MOVING OR SPEAKING SO SLOWLY THAT OTHER PEOPLE COULD HAVE NOTICED. OR THE OPPOSITE, BEING SO FIGETY OR RESTLESS THAT YOU HAVE BEEN MOVING AROUND A LOT MORE THAN USUAL: 0
7. TROUBLE CONCENTRATING ON THINGS, SUCH AS READING THE NEWSPAPER OR WATCHING TELEVISION: 0
4. FEELING TIRED OR HAVING LITTLE ENERGY: 0
1. LITTLE INTEREST OR PLEASURE IN DOING THINGS: 0
3. TROUBLE FALLING OR STAYING ASLEEP: 0
SUM OF ALL RESPONSES TO PHQ QUESTIONS 1-9: 0
10. IF YOU CHECKED OFF ANY PROBLEMS, HOW DIFFICULT HAVE THESE PROBLEMS MADE IT FOR YOU TO DO YOUR WORK, TAKE CARE OF THINGS AT HOME, OR GET ALONG WITH OTHER PEOPLE: 0
9. THOUGHTS THAT YOU WOULD BE BETTER OFF DEAD, OR OF HURTING YOURSELF: 0

## 2023-01-25 ASSESSMENT — ENCOUNTER SYMPTOMS
SORE THROAT: 0
VOMITING: 0
BACK PAIN: 0
SINUS PAIN: 0
NAUSEA: 0
DIARRHEA: 0
ABDOMINAL PAIN: 0
COUGH: 0
WHEEZING: 0

## 2023-01-25 NOTE — PROGRESS NOTES
HPI Notes    Name: Karli Alfaro  : 2000         Chief Complaint:     Chief Complaint   Patient presents with    Migraine     Migraines with dizziness. Follow up. Discuss medication       History of Present Illness:        HPI    This is a 27-year-old man presenting for reevaluation of migraines with dizziness. He has seen outpatient neurology and had been prescribed Topamax as well as Imitrex. He had created a headache diary, and reviewed that with Neurology. No consistent triggers identified, but headaches typically occur around the same time, middle of the day. He is no longer taking Topamax as it made him feel mentally fuzzy. This was d/c'd by neurology. He has had no subsequent follow up and has noted increased frequency of his headaches, which are still right sided retroocular pain with right hemicrania. He still has notable dizziness and blurry vision with scintillating scotoma. He has been attempting to eat regular meals, not use nicotine, drink plenty of water and optimize healthy habits. Imitrex helps but not fully. He has failed to have consistent benefit with Imitrex, Maxalt, Topamax now. Past Medical History:     Past Medical History:   Diagnosis Date    IBS (irritable bowel syndrome)     Insomnia     Irritable bowel syndrome with diarrhea 2021    Worked up, treated with Xifaxan 2021    Major depressive disorder     Migraine     Panic attacks 2021      Reviewed all health maintenance requirements and ordered appropriate tests  Health Maintenance Due   Topic Date Due    COVID-19 Vaccine (1) Never done    HPV vaccine (1 - Male 2-dose series) Never done    Flu vaccine (1) 2022    DTaP/Tdap/Td vaccine (7 - Td or Tdap) 2022       Past Surgical History:     Past Surgical History:   Procedure Laterality Date    TONSILLECTOMY          Medications:       Prior to Admission medications    Medication Sig Start Date End Date Taking?  Authorizing Provider   Rimegepant Sulfate 75 MG TBDP Take 75 mg by mouth every other day For preventative treatment of episodic migraine 1/25/23  Yes Ravi Gordillo,    omeprazole (PRILOSEC) 20 MG delayed release capsule Take 1 capsule by mouth every morning (before breakfast) 10/17/22  Yes Maximus Pena DO   dicyclomine (BENTYL) 10 MG capsule Take 10 mg by mouth  Patient not taking: Reported on 11/10/2022 11/9/22 11/16/22  Historical Provider, MD   ondansetron (ZOFRAN) 4 MG tablet Take 4 mg by mouth  Patient not taking: Reported on 1/25/2023 11/9/22   Historical Provider, MD   venlafaxine (EFFEXOR XR) 37.5 MG extended release capsule Take 1 capsule by mouth daily  Patient not taking: Reported on 1/25/2023 10/17/22   Maximus Pena DO   SUMAtriptan (IMITREX) 50 MG tablet Take 1 tablet by mouth once as needed for Migraine Take a second dose 2 hours later for persistent headache. Not more than 2 tablets a day 10/10/22 11/10/22  Chuck Osullivan MD        Allergies:       Patient has no known allergies. Social History:     Tobacco:    reports that he quit smoking about 7 months ago. His smoking use included cigarettes and e-cigarettes. He has never used smokeless tobacco.  Alcohol:      reports no history of alcohol use. Drug Use:  reports no history of drug use. Family History:     No family history on file. Review of Systems:         Review of Systems   Constitutional:  Negative for fever. HENT:  Negative for sinus pain, sneezing and sore throat. Respiratory:  Negative for cough and wheezing. Cardiovascular:  Negative for chest pain. Gastrointestinal:  Negative for abdominal pain, diarrhea, nausea and vomiting. Musculoskeletal:  Negative for back pain. Skin:  Negative for rash. Hematological:  Negative for adenopathy. Psychiatric/Behavioral:  Negative for sleep disturbance.           Physical Exam:     Vitals:  /80   Pulse 83   Wt 256 lb (116.1 kg)   SpO2 99%   BMI 33.78 kg/m²       Physical Exam  Vitals and nursing note reviewed. Constitutional:       General: He is not in acute distress. Appearance: Normal appearance. He is normal weight. Musculoskeletal:         General: No swelling or tenderness. Normal range of motion. Skin:     General: Skin is warm and dry. Capillary Refill: Capillary refill takes less than 2 seconds. Neurological:      General: No focal deficit present. Mental Status: He is alert and oriented to person, place, and time. Mental status is at baseline. Psychiatric:         Mood and Affect: Mood normal.         Behavior: Behavior normal.         Thought Content: Thought content normal.               Data:     Lab Results   Component Value Date/Time     11/08/2022 03:00 PM    K 4.0 11/08/2022 03:00 PM     11/08/2022 03:00 PM    CO2 29 11/08/2022 03:00 PM    BUN 15 11/08/2022 03:00 PM    CREATININE 1.22 11/08/2022 03:00 PM    GLUCOSE 108 11/08/2022 03:00 PM    GLUCOSE 93 11/25/2011 01:40 PM    PROT 8.2 11/08/2022 03:00 PM    LABALBU 4.8 11/08/2022 03:00 PM    BILITOT 0.4 11/08/2022 03:00 PM    ALKPHOS 162 11/08/2022 03:00 PM    AST 28 11/08/2022 03:00 PM    ALT 36 11/08/2022 03:00 PM     Lab Results   Component Value Date/Time    WBC 11.6 11/08/2022 03:00 PM    RBC 5.36 11/08/2022 03:00 PM    RBC 4.52 11/25/2011 01:40 PM    HGB 14.6 11/08/2022 03:00 PM    HCT 43.0 11/08/2022 03:00 PM    MCV 80.3 11/08/2022 03:00 PM    MCH 27.2 11/08/2022 03:00 PM    MCHC 33.8 11/08/2022 03:00 PM    RDW 14.7 11/08/2022 03:00 PM     11/08/2022 03:00 PM     11/25/2011 01:40 PM    MPV NOT REPORTED 01/17/2022 09:21 PM     Lab Results   Component Value Date/Time    TSH 2.47 12/25/2021 03:45 AM     Lab Results   Component Value Date/Time    LABA1C 5.2 11/04/2021 08:55 AM          Assessment & Plan        Diagnosis Orders   1.  Migraine with aura and without status migrainosus, not intractable  Rimegepant Sulfate 75 MG TBDP          As he has failed two triptans and Topamax, with Jessica Spivey having had the most benefit, I would recommend we consider Nurtec QOD for prophylactic therapy. He clearly has beneficial response to that drug class and QOD nurtec will likely offer the most convenient regimen of dosing. Will follow up in 4 weeks. The patient I had a long discussion about starting Nurtec 75 mg PO QOD. We discussed its mechanism of action, intended goals, adverse effects, as well as common side effects. They were able to verbalize understanding, and repeat plan back to me. Completed Refills   Requested Prescriptions     Signed Prescriptions Disp Refills    Rimegepant Sulfate 75 MG TBDP 16 tablet 2     Sig: Take 75 mg by mouth every other day For preventative treatment of episodic migraine     Return in about 4 weeks (around 2/22/2023) for migraines. Orders Placed This Encounter   Medications    Rimegepant Sulfate 75 MG TBDP     Sig: Take 75 mg by mouth every other day For preventative treatment of episodic migraine     Dispense:  16 tablet     Refill:  2     No orders of the defined types were placed in this encounter. There are no Patient Instructions on file for this visit.     Electronically signed by Tayler Correia DO on 1/25/2023 at 9:32 AM           Completed Refills   Requested Prescriptions     Signed Prescriptions Disp Refills    Rimegepant Sulfate 75 MG TBDP 16 tablet 2     Sig: Take 75 mg by mouth every other day For preventative treatment of episodic migraine

## 2023-05-30 DIAGNOSIS — K21.00 GASTROESOPHAGEAL REFLUX DISEASE WITH ESOPHAGITIS WITHOUT HEMORRHAGE: ICD-10-CM

## 2023-05-31 RX ORDER — OMEPRAZOLE 20 MG/1
20 CAPSULE, DELAYED RELEASE ORAL
Qty: 90 CAPSULE | Refills: 1 | Status: SHIPPED | OUTPATIENT
Start: 2023-05-31

## 2023-12-07 ENCOUNTER — OFFICE VISIT (OUTPATIENT)
Dept: FAMILY MEDICINE CLINIC | Age: 23
End: 2023-12-07
Payer: COMMERCIAL

## 2023-12-07 VITALS
BODY MASS INDEX: 35.21 KG/M2 | HEIGHT: 72 IN | OXYGEN SATURATION: 98 % | DIASTOLIC BLOOD PRESSURE: 92 MMHG | SYSTOLIC BLOOD PRESSURE: 140 MMHG | WEIGHT: 260 LBS | HEART RATE: 98 BPM

## 2023-12-07 DIAGNOSIS — R00.2 PALPITATIONS: Primary | ICD-10-CM

## 2023-12-07 DIAGNOSIS — R42 DIZZINESS: ICD-10-CM

## 2023-12-07 PROCEDURE — 99213 OFFICE O/P EST LOW 20 MIN: CPT | Performed by: STUDENT IN AN ORGANIZED HEALTH CARE EDUCATION/TRAINING PROGRAM

## 2023-12-07 SDOH — ECONOMIC STABILITY: INCOME INSECURITY: HOW HARD IS IT FOR YOU TO PAY FOR THE VERY BASICS LIKE FOOD, HOUSING, MEDICAL CARE, AND HEATING?: NOT HARD AT ALL

## 2023-12-07 SDOH — ECONOMIC STABILITY: HOUSING INSECURITY
IN THE LAST 12 MONTHS, WAS THERE A TIME WHEN YOU DID NOT HAVE A STEADY PLACE TO SLEEP OR SLEPT IN A SHELTER (INCLUDING NOW)?: NO

## 2023-12-07 SDOH — ECONOMIC STABILITY: FOOD INSECURITY: WITHIN THE PAST 12 MONTHS, THE FOOD YOU BOUGHT JUST DIDN'T LAST AND YOU DIDN'T HAVE MONEY TO GET MORE.: NEVER TRUE

## 2023-12-07 SDOH — ECONOMIC STABILITY: FOOD INSECURITY: WITHIN THE PAST 12 MONTHS, YOU WORRIED THAT YOUR FOOD WOULD RUN OUT BEFORE YOU GOT MONEY TO BUY MORE.: NEVER TRUE

## 2023-12-07 ASSESSMENT — ENCOUNTER SYMPTOMS
SORE THROAT: 0
WHEEZING: 0
NAUSEA: 0
CHEST TIGHTNESS: 1
SINUS PAIN: 0
BACK PAIN: 0
VOMITING: 0
COUGH: 0
ABDOMINAL PAIN: 0
DIARRHEA: 0

## 2023-12-07 NOTE — PROGRESS NOTES
CO2 29 11/08/2022 03:00 PM    BUN 15 11/08/2022 03:00 PM    CREATININE 1.22 11/08/2022 03:00 PM    GLUCOSE 108 11/08/2022 03:00 PM    GLUCOSE 93 11/25/2011 01:40 PM    PROT 8.2 11/08/2022 03:00 PM    LABALBU 4.8 11/08/2022 03:00 PM    BILITOT 0.4 11/08/2022 03:00 PM    ALKPHOS 162 11/08/2022 03:00 PM    AST 28 11/08/2022 03:00 PM    ALT 36 11/08/2022 03:00 PM     Lab Results   Component Value Date/Time    WBC 11.6 11/08/2022 03:00 PM    RBC 5.36 11/08/2022 03:00 PM    RBC 4.52 11/25/2011 01:40 PM    HGB 14.6 11/08/2022 03:00 PM    HCT 43.0 11/08/2022 03:00 PM    MCV 80.3 11/08/2022 03:00 PM    MCH 27.2 11/08/2022 03:00 PM    MCHC 33.8 11/08/2022 03:00 PM    RDW 14.7 11/08/2022 03:00 PM     11/08/2022 03:00 PM     11/25/2011 01:40 PM    MPV NOT REPORTED 01/17/2022 09:21 PM     Lab Results   Component Value Date/Time    TSH 2.47 12/25/2021 03:45 AM     Lab Results   Component Value Date/Time    LABA1C 5.2 11/04/2021 08:55 AM          Assessment & Plan        Diagnosis Orders   1. Palpitations        2. Dizziness            This sounds to be a tachyarrhythmia, but NSR on CP exam today. Will check Holter 48 hr study and have him attempt vagal maneuvers to frances episodes in the meantime (discussed Valsalva and Mammalian diving reflex). Follow up PRN depending on results. Completed Refills   Requested Prescriptions      No prescriptions requested or ordered in this encounter     No follow-ups on file. No orders of the defined types were placed in this encounter. No orders of the defined types were placed in this encounter. Patient Instructions   SURVEY:    You may be receiving a survey from [employer] regarding your visit today. You may get this in the mail, through your MyChart or in your email. Please complete the survey to enable us to provide the highest quality of care to you and your family.     If you cannot score us as very good ( 5 Stars) on any question, please feel

## 2023-12-07 NOTE — PATIENT INSTRUCTIONS
Chance,    Thank you for coming to see me today! I believe that our main problem is the palpitations. Here's what I would recommend we do: We'll check the heart rhythm with a 48 hour Holter monitor study. We'll call you with the results    We also discussed that the next time this episode happens, blow through a straw for a few minutes. Or you can put an ice bag on your face. This should slow your heart rate down. Please review the documents I'm attaching related to what we discussed today. Please give me a call or message me on [employer] if you have any problems or questions, and I will see you at your next visit. Dr. Juan Oscar:    Juan Rodriguez may be receiving a survey from [employer] regarding your visit today. You may get this in the mail, through your [employer] or in your email. Please complete the survey to enable us to provide the highest quality of care to you and your family. If you cannot score us as very good ( 5 Stars) on any question, please feel free to call the office to discuss how we could have made your experience exceptional.     Thank you.     Clinical Care Team:  DO Arnol Garduno LPN    Triage:  Racquel Russ, [address] [facility] Team:  Dakotah Aguilar

## 2023-12-10 DIAGNOSIS — K21.00 GASTROESOPHAGEAL REFLUX DISEASE WITH ESOPHAGITIS WITHOUT HEMORRHAGE: ICD-10-CM

## 2023-12-11 ENCOUNTER — HOSPITAL ENCOUNTER (OUTPATIENT)
Age: 23
Discharge: HOME | End: 2023-12-13
Attending: STUDENT IN AN ORGANIZED HEALTH CARE EDUCATION/TRAINING PROGRAM
Payer: COMMERCIAL

## 2023-12-11 DIAGNOSIS — R42 DIZZINESS: ICD-10-CM

## 2023-12-11 DIAGNOSIS — R00.2 PALPITATIONS: ICD-10-CM

## 2023-12-11 PROCEDURE — 93225 XTRNL ECG REC<48 HRS REC: CPT

## 2023-12-11 RX ORDER — OMEPRAZOLE 20 MG/1
20 CAPSULE, DELAYED RELEASE ORAL
Qty: 90 CAPSULE | Refills: 1 | Status: SHIPPED | OUTPATIENT
Start: 2023-12-11

## 2023-12-11 NOTE — TELEPHONE ENCOUNTER
Last OV: 12/7/2023   10/17/23 chronic   Last RX:    Next scheduled apt: Visit date not found            Pt requesting a refill

## 2023-12-17 LAB
ACQUISITION DURATION: NORMAL S
AVERAGE HEART RATE: 102 BPM
EKG DIAGNOSIS: NORMAL
HOLTER MAX HEART RATE: 142 BPM
HOOKUP DATE: NORMAL
HOOKUP TIME: NORMAL
MAX HEART RATE TIME/DATE: NORMAL
MIN HEART RATE TIME/DATE: NORMAL
MIN HEART RATE: 67 BPM
NUMBER OF QRS COMPLEXES: NORMAL
NUMBER OF SUPRAVENTRICULAR COUPLETS: 0
NUMBER OF SUPRAVENTRICULAR ECTOPICS: 0
NUMBER OF SUPRAVENTRICULAR ISOLATED BEATS: 0
NUMBER OF VENTRICULAR BIGEMINAL CYCLES: 0
NUMBER OF VENTRICULAR COUPLETS: 1
NUMBER OF VENTRICULAR ECTOPICS: 2

## 2024-04-12 ENCOUNTER — HOSPITAL ENCOUNTER (OUTPATIENT)
Age: 24
Discharge: HOME | End: 2024-04-12
Payer: COMMERCIAL

## 2024-04-12 ENCOUNTER — OFFICE VISIT (OUTPATIENT)
Dept: FAMILY MEDICINE CLINIC | Age: 24
End: 2024-04-12
Payer: COMMERCIAL

## 2024-04-12 VITALS
WEIGHT: 265 LBS | DIASTOLIC BLOOD PRESSURE: 84 MMHG | OXYGEN SATURATION: 98 % | SYSTOLIC BLOOD PRESSURE: 138 MMHG | HEART RATE: 99 BPM | BODY MASS INDEX: 35.94 KG/M2

## 2024-04-12 DIAGNOSIS — R00.2 PALPITATIONS: ICD-10-CM

## 2024-04-12 DIAGNOSIS — R09.81 NASAL CONGESTION: ICD-10-CM

## 2024-04-12 DIAGNOSIS — J34.89 SINUS PRESSURE: ICD-10-CM

## 2024-04-12 DIAGNOSIS — R05.9 COUGH IN ADULT: Primary | ICD-10-CM

## 2024-04-12 DIAGNOSIS — H65.92 OME (OTITIS MEDIA WITH EFFUSION), LEFT: ICD-10-CM

## 2024-04-12 LAB
25(OH)D3 SERPL-MCNC: 11.8 NG/ML (ref 30–100)
ALBUMIN SERPL-MCNC: 4.4 G/DL (ref 3.5–5.2)
ALP SERPL-CCNC: 139 U/L (ref 40–129)
ALT SERPL-CCNC: 22 U/L (ref 5–41)
ANION GAP SERPL CALCULATED.3IONS-SCNC: 9 MMOL/L (ref 9–17)
AST SERPL-CCNC: 26 U/L
BASOPHILS # BLD: 0.03 K/UL (ref 0–0.2)
BASOPHILS NFR BLD: 0 % (ref 0–2)
BILIRUB SERPL-MCNC: 0.3 MG/DL (ref 0.3–1.2)
BUN SERPL-MCNC: 15 MG/DL (ref 6–20)
BUN/CREAT SERPL: 13 (ref 9–20)
CALCIUM SERPL-MCNC: 9.4 MG/DL (ref 8.6–10.4)
CHLORIDE SERPL-SCNC: 101 MMOL/L (ref 98–107)
CO2 SERPL-SCNC: 27 MMOL/L (ref 20–31)
CREAT SERPL-MCNC: 1.2 MG/DL (ref 0.7–1.2)
EOSINOPHIL # BLD: 0.1 K/UL (ref 0–0.4)
EOSINOPHILS RELATIVE PERCENT: 1 % (ref 0–5)
ERYTHROCYTE [DISTWIDTH] IN BLOOD BY AUTOMATED COUNT: 13.6 % (ref 12.1–15.2)
GFR SERPL CREATININE-BSD FRML MDRD: 87 ML/MIN/1.73M2
GLUCOSE SERPL-MCNC: 99 MG/DL (ref 70–99)
HCT VFR BLD AUTO: 42.2 % (ref 41–53)
HGB BLD-MCNC: 13.8 G/DL (ref 13.5–17.5)
IMM GRANULOCYTES # BLD AUTO: 0.03 K/UL (ref 0–0.3)
IMM GRANULOCYTES NFR BLD: 0 % (ref 0–5)
LYMPHOCYTES NFR BLD: 2.85 K/UL (ref 1–4.8)
LYMPHOCYTES RELATIVE PERCENT: 27 % (ref 13–44)
MCH RBC QN AUTO: 25.8 PG (ref 26–34)
MCHC RBC AUTO-ENTMCNC: 32.7 G/DL (ref 31–37)
MCV RBC AUTO: 79 FL (ref 80–100)
MONOCYTES NFR BLD: 0.62 K/UL (ref 0–1)
MONOCYTES NFR BLD: 6 % (ref 5–9)
NEUTROPHILS NFR BLD: 66 % (ref 39–75)
NEUTS SEG NFR BLD: 7.09 K/UL (ref 2.1–6.5)
PLATELET # BLD AUTO: 383 K/UL (ref 140–450)
PMV BLD AUTO: 8.8 FL (ref 6–12)
POTASSIUM SERPL-SCNC: 3.8 MMOL/L (ref 3.7–5.3)
PROT SERPL-MCNC: 7.7 G/DL (ref 6.4–8.3)
RBC # BLD AUTO: 5.34 M/UL (ref 4.5–5.9)
SODIUM SERPL-SCNC: 137 MMOL/L (ref 135–144)
WBC OTHER # BLD: 10.7 K/UL (ref 3.5–11)

## 2024-04-12 PROCEDURE — 36415 COLL VENOUS BLD VENIPUNCTURE: CPT

## 2024-04-12 PROCEDURE — 80053 COMPREHEN METABOLIC PANEL: CPT

## 2024-04-12 PROCEDURE — 99214 OFFICE O/P EST MOD 30 MIN: CPT | Performed by: STUDENT IN AN ORGANIZED HEALTH CARE EDUCATION/TRAINING PROGRAM

## 2024-04-12 PROCEDURE — 85025 COMPLETE CBC W/AUTO DIFF WBC: CPT

## 2024-04-12 PROCEDURE — 82306 VITAMIN D 25 HYDROXY: CPT

## 2024-04-12 RX ORDER — AMOXICILLIN AND CLAVULANATE POTASSIUM 875; 125 MG/1; MG/1
1 TABLET, FILM COATED ORAL 2 TIMES DAILY
Qty: 14 TABLET | Refills: 0 | Status: SHIPPED | OUTPATIENT
Start: 2024-04-12 | End: 2024-04-19

## 2024-04-12 ASSESSMENT — PATIENT HEALTH QUESTIONNAIRE - PHQ9
8. MOVING OR SPEAKING SO SLOWLY THAT OTHER PEOPLE COULD HAVE NOTICED. OR THE OPPOSITE, BEING SO FIGETY OR RESTLESS THAT YOU HAVE BEEN MOVING AROUND A LOT MORE THAN USUAL: NOT AT ALL
4. FEELING TIRED OR HAVING LITTLE ENERGY: NOT AT ALL
2. FEELING DOWN, DEPRESSED OR HOPELESS: NOT AT ALL
9. THOUGHTS THAT YOU WOULD BE BETTER OFF DEAD, OR OF HURTING YOURSELF: NOT AT ALL
6. FEELING BAD ABOUT YOURSELF - OR THAT YOU ARE A FAILURE OR HAVE LET YOURSELF OR YOUR FAMILY DOWN: NOT AT ALL
SUM OF ALL RESPONSES TO PHQ9 QUESTIONS 1 & 2: 0
5. POOR APPETITE OR OVEREATING: NOT AT ALL
1. LITTLE INTEREST OR PLEASURE IN DOING THINGS: NOT AT ALL
10. IF YOU CHECKED OFF ANY PROBLEMS, HOW DIFFICULT HAVE THESE PROBLEMS MADE IT FOR YOU TO DO YOUR WORK, TAKE CARE OF THINGS AT HOME, OR GET ALONG WITH OTHER PEOPLE: NOT DIFFICULT AT ALL
3. TROUBLE FALLING OR STAYING ASLEEP: NOT AT ALL
SUM OF ALL RESPONSES TO PHQ QUESTIONS 1-9: 0
7. TROUBLE CONCENTRATING ON THINGS, SUCH AS READING THE NEWSPAPER OR WATCHING TELEVISION: NOT AT ALL
SUM OF ALL RESPONSES TO PHQ QUESTIONS 1-9: 0

## 2024-04-12 ASSESSMENT — ENCOUNTER SYMPTOMS
BACK PAIN: 0
DIARRHEA: 0
COUGH: 1
VOMITING: 0
WHEEZING: 0
SORE THROAT: 1
NAUSEA: 0
SINUS PRESSURE: 1
SINUS PAIN: 1
ABDOMINAL PAIN: 0

## 2024-04-12 NOTE — PROGRESS NOTES
No swelling or tenderness. Normal range of motion.   Skin:     General: Skin is warm and dry.      Capillary Refill: Capillary refill takes less than 2 seconds.   Neurological:      General: No focal deficit present.      Mental Status: He is alert and oriented to person, place, and time. Mental status is at baseline.   Psychiatric:         Mood and Affect: Mood normal.         Behavior: Behavior normal.         Thought Content: Thought content normal.                 Data:     Lab Results   Component Value Date/Time     11/08/2022 03:00 PM    K 4.0 11/08/2022 03:00 PM     11/08/2022 03:00 PM    CO2 29 11/08/2022 03:00 PM    BUN 15 11/08/2022 03:00 PM    CREATININE 1.22 11/08/2022 03:00 PM    GLUCOSE 108 11/08/2022 03:00 PM    GLUCOSE 93 11/25/2011 01:40 PM    PROT 8.2 11/08/2022 03:00 PM    LABALBU 4.8 11/08/2022 03:00 PM    BILITOT 0.4 11/08/2022 03:00 PM    ALKPHOS 162 11/08/2022 03:00 PM    AST 28 11/08/2022 03:00 PM    ALT 36 11/08/2022 03:00 PM     Lab Results   Component Value Date/Time    WBC 11.6 11/08/2022 03:00 PM    RBC 5.36 11/08/2022 03:00 PM    RBC 4.52 11/25/2011 01:40 PM    HGB 14.6 11/08/2022 03:00 PM    HCT 43.0 11/08/2022 03:00 PM    MCV 80.3 11/08/2022 03:00 PM    MCH 27.2 11/08/2022 03:00 PM    MCHC 33.8 11/08/2022 03:00 PM    RDW 14.7 11/08/2022 03:00 PM     11/08/2022 03:00 PM     11/25/2011 01:40 PM    MPV NOT REPORTED 01/17/2022 09:21 PM     Lab Results   Component Value Date/Time    TSH 2.47 12/25/2021 03:45 AM     Lab Results   Component Value Date/Time    LABA1C 5.2 11/04/2021 08:55 AM          Assessment & Plan        Diagnosis Orders   1. Cough in adult  amoxicillin-clavulanate (AUGMENTIN) 875-125 MG per tablet      2. Nasal congestion  amoxicillin-clavulanate (AUGMENTIN) 875-125 MG per tablet      3. Sinus pressure  amoxicillin-clavulanate (AUGMENTIN) 875-125 MG per tablet      4. OME (otitis media with effusion), left  amoxicillin-clavulanate (AUGMENTIN)

## 2024-04-12 NOTE — PATIENT INSTRUCTIONS
SURVEY:    You may be receiving a survey from [facility] regarding your visit today.    You may get this in the mail, through your MyChart or in your email.     Please complete the survey to enable us to provide the highest quality of care to you and your family.    If you cannot score us as very good ( 5 Stars) on any question, please feel free to call the office to discuss how we could have made your experience exceptional.     Thank you.    Clinical Care Team:  Dr. Joce Pena, DO Hodan Barker LPN    Triage:  Jemma Salcedo CMA    Clerical Team:  Jemma Harden

## 2024-04-15 DIAGNOSIS — E55.9 VITAMIN D DEFICIENCY: Primary | ICD-10-CM

## 2024-04-15 RX ORDER — ERGOCALCIFEROL 1.25 MG/1
50000 CAPSULE ORAL WEEKLY
Qty: 4 CAPSULE | Refills: 0 | Status: SHIPPED | OUTPATIENT
Start: 2024-04-15

## 2024-05-15 DIAGNOSIS — H65.92 OME (OTITIS MEDIA WITH EFFUSION), LEFT: Primary | ICD-10-CM

## 2024-05-15 RX ORDER — PREDNISONE 20 MG/1
40 TABLET ORAL DAILY
Qty: 10 TABLET | Refills: 0 | Status: SHIPPED | OUTPATIENT
Start: 2024-05-15 | End: 2024-05-20

## 2024-05-31 DIAGNOSIS — K21.00 GASTROESOPHAGEAL REFLUX DISEASE WITH ESOPHAGITIS WITHOUT HEMORRHAGE: ICD-10-CM

## 2024-05-31 RX ORDER — OMEPRAZOLE 20 MG/1
20 CAPSULE, DELAYED RELEASE ORAL
Qty: 90 CAPSULE | Refills: 1 | Status: SHIPPED | OUTPATIENT
Start: 2024-05-31

## 2024-05-31 NOTE — TELEPHONE ENCOUNTER
Last OV: 4/12/2024    Next scheduled apt: Visit date not found      Patient requesting refill  Medication pending

## 2024-06-10 ENCOUNTER — HOSPITAL ENCOUNTER (OUTPATIENT)
Age: 24
Discharge: HOME | End: 2024-06-10
Payer: COMMERCIAL

## 2024-06-10 ENCOUNTER — OFFICE VISIT (OUTPATIENT)
Dept: FAMILY MEDICINE CLINIC | Age: 24
End: 2024-06-10
Payer: COMMERCIAL

## 2024-06-10 VITALS
SYSTOLIC BLOOD PRESSURE: 158 MMHG | WEIGHT: 261 LBS | DIASTOLIC BLOOD PRESSURE: 98 MMHG | BODY MASS INDEX: 35.4 KG/M2 | HEART RATE: 95 BPM | OXYGEN SATURATION: 98 %

## 2024-06-10 DIAGNOSIS — R42 DIZZINESS: ICD-10-CM

## 2024-06-10 DIAGNOSIS — R03.0 ELEVATED BLOOD PRESSURE READING IN OFFICE WITHOUT DIAGNOSIS OF HYPERTENSION: ICD-10-CM

## 2024-06-10 DIAGNOSIS — E55.9 VITAMIN D DEFICIENCY: ICD-10-CM

## 2024-06-10 DIAGNOSIS — G44.1 OTHER VASCULAR HEADACHE: Primary | ICD-10-CM

## 2024-06-10 DIAGNOSIS — R00.2 PALPITATIONS: ICD-10-CM

## 2024-06-10 PROCEDURE — 82306 VITAMIN D 25 HYDROXY: CPT

## 2024-06-10 PROCEDURE — 36415 COLL VENOUS BLD VENIPUNCTURE: CPT

## 2024-06-10 PROCEDURE — 99214 OFFICE O/P EST MOD 30 MIN: CPT | Performed by: STUDENT IN AN ORGANIZED HEALTH CARE EDUCATION/TRAINING PROGRAM

## 2024-06-10 RX ORDER — METOPROLOL SUCCINATE 50 MG/1
50 TABLET, EXTENDED RELEASE ORAL DAILY
Qty: 30 TABLET | Refills: 0 | Status: SHIPPED | OUTPATIENT
Start: 2024-06-10

## 2024-06-10 ASSESSMENT — ENCOUNTER SYMPTOMS
ABDOMINAL PAIN: 0
VOMITING: 0
DIARRHEA: 0
SORE THROAT: 0
COUGH: 0
WHEEZING: 0
SINUS PAIN: 0
BACK PAIN: 0
NAUSEA: 0

## 2024-06-10 NOTE — PATIENT INSTRUCTIONS
Chance,    Thank you for coming to see me today!    I believe that our main problem is your headaches, fatigue, palpitations.     Here's what I would recommend we do:    Don't fill the amoxicillin; I don't think you need it.  I think this all may be due to your high blood pressure. I think we were mistaken thinking the BP was a symptom, it may in fact be the primary problem causing your symptoms.    Start taking Toprol XL 50 mg once a day and come back in 1 week to see how you're doing.     We also discussed getting your vitamin D rechecked today.    Please review the documents I'm attaching related to what we discussed today.    Please give me a call or message me on [employer] if you have any problems or questions, and I will see you at your next visit.    Dr. REDMOND        SURVEY:    You may be receiving a survey from [employer] regarding your visit today.    You may get this in the mail, through your MyChart or in your email.     Please complete the survey to enable us to provide the highest quality of care to you and your family.    If you cannot score us as very good ( 5 Stars) on any question, please feel free to call the office to discuss how we could have made your experience exceptional.     Thank you.    Clinical Care Team:  DO Hodan Benson LPN    Triage:  Jemma Salcedo CMA    Clerical Team:  Jemma Harden

## 2024-06-10 NOTE — PROGRESS NOTES
HPI Notes    Name: Sabas Madrid  : 2000         Chief Complaint:     Chief Complaint   Patient presents with    Headache     Patient has been experiencing head pain that sometimes radiate from his ears to the top of his head. He has dizziness and fatigue as well.        History of Present Illness:      HPI    This is a 24 year old young man with vitamin D deficiency presenting for a headache radiating from the ears to the top of the head. He is also endorsing concurrent dizziness (specifies as dysequilibrium), and fatigue. He actually was seen at [facility] ED yesterday, whose notes and workup I did have a chance to review. CBC, CMP, mono screen, COVID-19, rapid strep testing were all unremarkable, as was CXR. He was diagnosed with AOM and prescribed amoxicillin. BP is still elevated. He has not started Amoxil yet.     Past Medical History:     Past Medical History:   Diagnosis Date    IBS (irritable bowel syndrome)     Insomnia     Irritable bowel syndrome with diarrhea 2021    Worked up, treated with Xifaxan 2021    Major depressive disorder     Migraine     Panic attacks 2021      Reviewed all health maintenance requirements and ordered appropriate tests  Health Maintenance Due   Topic Date Due    COVID-19 Vaccine (1) Never done    HPV vaccine (1 - Male 2-dose series) Never done    DTaP/Tdap/Td vaccine (7 - Td or Tdap) 2022       Past Surgical History:     Past Surgical History:   Procedure Laterality Date    TONSILLECTOMY          Medications:       Prior to Admission medications    Medication Sig Start Date End Date Taking? Authorizing Provider   metoprolol succinate (TOPROL XL) 50 MG extended release tablet Take 1 tablet by mouth daily 6/10/24  Yes Joce Pena DO   omeprazole (PRILOSEC) 20 MG delayed release capsule Take 1 capsule by mouth every morning (before breakfast) 24  Yes Joce Pena DO   vitamin D (ERGOCALCIFEROL) 1.25 MG (32001 UT) CAPS capsule Take 1

## 2024-06-11 DIAGNOSIS — E55.9 VITAMIN D DEFICIENCY: Primary | ICD-10-CM

## 2024-06-11 LAB — 25(OH)D3 SERPL-MCNC: 24.7 NG/ML (ref 30–100)

## 2024-06-11 RX ORDER — ERGOCALCIFEROL 1.25 MG/1
50000 CAPSULE ORAL WEEKLY
Qty: 4 CAPSULE | Refills: 0 | Status: SHIPPED | OUTPATIENT
Start: 2024-06-11

## 2024-06-17 ENCOUNTER — OFFICE VISIT (OUTPATIENT)
Dept: FAMILY MEDICINE CLINIC | Age: 24
End: 2024-06-17
Payer: COMMERCIAL

## 2024-06-17 VITALS
OXYGEN SATURATION: 98 % | WEIGHT: 260 LBS | DIASTOLIC BLOOD PRESSURE: 80 MMHG | BODY MASS INDEX: 35.26 KG/M2 | HEART RATE: 66 BPM | SYSTOLIC BLOOD PRESSURE: 130 MMHG

## 2024-06-17 DIAGNOSIS — E55.9 VITAMIN D DEFICIENCY: ICD-10-CM

## 2024-06-17 DIAGNOSIS — G44.1 OTHER VASCULAR HEADACHE: Primary | ICD-10-CM

## 2024-06-17 DIAGNOSIS — R00.2 PALPITATIONS: ICD-10-CM

## 2024-06-17 DIAGNOSIS — R03.0 ELEVATED BLOOD PRESSURE READING IN OFFICE WITHOUT DIAGNOSIS OF HYPERTENSION: ICD-10-CM

## 2024-06-17 DIAGNOSIS — R42 DIZZINESS: ICD-10-CM

## 2024-06-17 PROCEDURE — 99214 OFFICE O/P EST MOD 30 MIN: CPT | Performed by: STUDENT IN AN ORGANIZED HEALTH CARE EDUCATION/TRAINING PROGRAM

## 2024-06-17 RX ORDER — METOPROLOL SUCCINATE 50 MG/1
50 TABLET, EXTENDED RELEASE ORAL DAILY
Qty: 30 TABLET | Refills: 5 | Status: SHIPPED | OUTPATIENT
Start: 2024-06-17

## 2024-06-17 ASSESSMENT — ENCOUNTER SYMPTOMS
SORE THROAT: 0
VOMITING: 0
DIARRHEA: 0
ABDOMINAL PAIN: 0
SINUS PAIN: 0
COUGH: 0
WHEEZING: 0
BACK PAIN: 0
NAUSEA: 0

## 2024-06-17 NOTE — PROGRESS NOTES
Dispense:  30 tablet     Refill:  5     No orders of the defined types were placed in this encounter.        There are no Patient Instructions on file for this visit.    Electronically signed by Joce Pena DO on 6/17/2024 at 10:00 AM           Completed Refills   Requested Prescriptions     Signed Prescriptions Disp Refills    metoprolol succinate (TOPROL XL) 50 MG extended release tablet 30 tablet 5     Sig: Take 1 tablet by mouth daily

## 2024-09-12 ENCOUNTER — HOSPITAL ENCOUNTER (OUTPATIENT)
Age: 24
Discharge: HOME | End: 2024-09-12
Payer: COMMERCIAL

## 2024-09-12 ENCOUNTER — OFFICE VISIT (OUTPATIENT)
Dept: FAMILY MEDICINE CLINIC | Age: 24
End: 2024-09-12
Payer: COMMERCIAL

## 2024-09-12 VITALS
HEART RATE: 84 BPM | OXYGEN SATURATION: 99 % | BODY MASS INDEX: 35.8 KG/M2 | SYSTOLIC BLOOD PRESSURE: 128 MMHG | DIASTOLIC BLOOD PRESSURE: 90 MMHG | WEIGHT: 264 LBS

## 2024-09-12 DIAGNOSIS — R00.2 PALPITATIONS: Primary | ICD-10-CM

## 2024-09-12 DIAGNOSIS — E55.9 VITAMIN D DEFICIENCY: ICD-10-CM

## 2024-09-12 DIAGNOSIS — R06.02 EXERTIONAL SHORTNESS OF BREATH: ICD-10-CM

## 2024-09-12 DIAGNOSIS — R07.89 CHEST TIGHTNESS: ICD-10-CM

## 2024-09-12 DIAGNOSIS — R00.2 PALPITATIONS: ICD-10-CM

## 2024-09-12 DIAGNOSIS — R53.83 OTHER FATIGUE: ICD-10-CM

## 2024-09-12 LAB
ALBUMIN SERPL-MCNC: 4.4 G/DL (ref 3.5–5.2)
ALP SERPL-CCNC: 135 U/L (ref 40–129)
ALT SERPL-CCNC: 32 U/L (ref 5–41)
ANION GAP SERPL CALCULATED.3IONS-SCNC: 12 MMOL/L (ref 9–17)
AST SERPL-CCNC: 28 U/L
BASOPHILS # BLD: 0.02 K/UL (ref 0–0.2)
BASOPHILS NFR BLD: 0 % (ref 0–2)
BILIRUB SERPL-MCNC: 0.4 MG/DL (ref 0.3–1.2)
BUN SERPL-MCNC: 13 MG/DL (ref 6–20)
BUN/CREAT SERPL: 11 (ref 9–20)
CALCIUM SERPL-MCNC: 9.8 MG/DL (ref 8.6–10.4)
CHLORIDE SERPL-SCNC: 101 MMOL/L (ref 98–107)
CO2 SERPL-SCNC: 26 MMOL/L (ref 20–31)
CREAT SERPL-MCNC: 1.2 MG/DL (ref 0.7–1.2)
EOSINOPHIL # BLD: 0.12 K/UL (ref 0–0.4)
EOSINOPHILS RELATIVE PERCENT: 1 % (ref 0–5)
ERYTHROCYTE [DISTWIDTH] IN BLOOD BY AUTOMATED COUNT: 13.8 % (ref 12.1–15.2)
GFR, ESTIMATED: 87 ML/MIN/1.73M2
GLUCOSE SERPL-MCNC: 94 MG/DL (ref 70–99)
HCT VFR BLD AUTO: 42.6 % (ref 41–53)
HGB BLD-MCNC: 13.9 G/DL (ref 13.5–17.5)
IMM GRANULOCYTES # BLD AUTO: 0.03 K/UL (ref 0–0.3)
IMM GRANULOCYTES NFR BLD: 0 % (ref 0–5)
LYMPHOCYTES NFR BLD: 2.56 K/UL (ref 1–4.8)
LYMPHOCYTES RELATIVE PERCENT: 29 % (ref 13–44)
MCH RBC QN AUTO: 26.1 PG (ref 26–34)
MCHC RBC AUTO-ENTMCNC: 32.6 G/DL (ref 31–37)
MCV RBC AUTO: 79.9 FL (ref 80–100)
MONOCYTES NFR BLD: 0.55 K/UL (ref 0–1)
MONOCYTES NFR BLD: 6 % (ref 5–9)
NEUTROPHILS NFR BLD: 64 % (ref 39–75)
NEUTS SEG NFR BLD: 5.55 K/UL (ref 2.1–6.5)
PLATELET # BLD AUTO: 369 K/UL (ref 140–450)
PMV BLD AUTO: 9.1 FL (ref 6–12)
POTASSIUM SERPL-SCNC: 4 MMOL/L (ref 3.7–5.3)
PROT SERPL-MCNC: 7.9 G/DL (ref 6.4–8.3)
RBC # BLD AUTO: 5.33 M/UL (ref 4.5–5.9)
SODIUM SERPL-SCNC: 139 MMOL/L (ref 135–144)
TSH SERPL DL<=0.05 MIU/L-ACNC: 1.3 UIU/ML (ref 0.3–5)
WBC OTHER # BLD: 8.8 K/UL (ref 3.5–11)

## 2024-09-12 PROCEDURE — 36415 COLL VENOUS BLD VENIPUNCTURE: CPT

## 2024-09-12 PROCEDURE — 85025 COMPLETE CBC W/AUTO DIFF WBC: CPT

## 2024-09-12 PROCEDURE — 84443 ASSAY THYROID STIM HORMONE: CPT

## 2024-09-12 PROCEDURE — 80053 COMPREHEN METABOLIC PANEL: CPT

## 2024-09-12 PROCEDURE — 99214 OFFICE O/P EST MOD 30 MIN: CPT | Performed by: STUDENT IN AN ORGANIZED HEALTH CARE EDUCATION/TRAINING PROGRAM

## 2024-09-12 ASSESSMENT — ENCOUNTER SYMPTOMS
SHORTNESS OF BREATH: 1
WHEEZING: 0
COUGH: 0
VOMITING: 0
ABDOMINAL PAIN: 0
SINUS PAIN: 0
SORE THROAT: 0
DIARRHEA: 0
NAUSEA: 0
BACK PAIN: 0

## 2024-09-16 ENCOUNTER — TELEPHONE (OUTPATIENT)
Dept: FAMILY MEDICINE CLINIC | Age: 24
End: 2024-09-16

## 2024-09-16 DIAGNOSIS — R53.83 OTHER FATIGUE: ICD-10-CM

## 2024-09-16 DIAGNOSIS — R06.02 EXERTIONAL SHORTNESS OF BREATH: Primary | ICD-10-CM

## 2024-09-18 ENCOUNTER — HOSPITAL ENCOUNTER (OUTPATIENT)
Dept: SLEEP CENTER | Age: 24
Discharge: HOME | End: 2024-09-18

## 2024-09-18 VITALS — HEIGHT: 72 IN | WEIGHT: 264 LBS | BODY MASS INDEX: 35.76 KG/M2

## 2024-09-18 DIAGNOSIS — R53.83 OTHER FATIGUE: ICD-10-CM

## 2024-09-18 ASSESSMENT — SLEEP AND FATIGUE QUESTIONNAIRES
HOW LIKELY ARE YOU TO NOD OFF OR FALL ASLEEP WHEN YOU ARE A PASSENGER IN A CAR FOR AN HOUR WITHOUT A BREAK: MODERATE CHANCE OF DOZING
HAVE YOU EVER NODDED OFF OR FALLEN ASLEEP WHILE DRIVING: NO
HOW OFTEN DO YOU SNORE: 1-2 TIMES A WEEK
SNORING VOLUME: SLIGHTLY LOUDER THAN BREATHING
ARE YOU TIRED DURING WAKE TIME: ALMOST DAILY
HAS ANYONE NOTICED THAT YOU QUIT BREATHING DURING SLEEP: NEVER/ALMOST NEVER
HOW LIKELY ARE YOU TO NOD OFF OR FALL ASLEEP WHILE SITTING AND TALKING TO SOMEONE: WOULD NEVER DOZE
HOW MANY NAPS DO YOU TAKE PER WEEK: 0
HOW LIKELY ARE YOU TO NOD OFF OR FALL ASLEEP WHILE SITTING INACTIVE IN A PUBLIC PLACE: WOULD NEVER DOZE
ARE YOU TIRED AFTER SLEEPING: ALMOST DAILY
HOW LIKELY ARE YOU TO NOD OFF OR FALL ASLEEP WHILE SITTING QUIETLY AFTER LUNCH WITHOUT ALCOHOL: SLIGHT CHANCE OF DOZING
HOW LIKELY ARE YOU TO NOD OFF OR FALL ASLEEP WHILE SITTING AND READING: MODERATE CHANCE OF DOZING
DO YOU HAVE HIGH BLOOD PRESSURE: YES
DO YOU SNORE: YES
DOES YOUR SNORING BOTHER OTHERS: NO
ESS TOTAL SCORE: 7
WHAT TIME DO YOU USUALLY WAKE UP: 28800
FUNCTION BEST IN: MORNING
USUAL AMOUNT OF TIME TO FALL ASLEEP (MIN): 30
WHAT TIME DO YOU USUALLY GO TO BED: 82800
NORMAL AMOUNT OF SLEEP PER NIGHT: 7
HOW LIKELY ARE YOU TO NOD OFF OR FALL ASLEEP WHILE LYING DOWN TO REST IN THE AFTERNOON WHEN CIRCUMSTANCES PERMIT: SLIGHT CHANCE OF DOZING
HOW LIKELY ARE YOU TO NOD OFF OR FALL ASLEEP WHILE WATCHING TV: SLIGHT CHANCE OF DOZING
HOW LIKELY ARE YOU TO NOD OFF OR FALL ASLEEP IN A CAR, WHILE STOPPED FOR A FEW MINUTES IN TRAFFIC: WOULD NEVER DOZE
NUMBER OF TIMES YOU WAKE PER NIGHT: 3

## 2024-09-25 LAB — STATUS: NORMAL

## 2024-09-26 PROBLEM — G47.30 MILD SLEEP APNEA: Status: ACTIVE | Noted: 2024-09-26

## 2024-10-18 ENCOUNTER — OFFICE VISIT (OUTPATIENT)
Dept: FAMILY MEDICINE CLINIC | Age: 24
End: 2024-10-18
Payer: COMMERCIAL

## 2024-10-18 VITALS
WEIGHT: 270 LBS | SYSTOLIC BLOOD PRESSURE: 130 MMHG | OXYGEN SATURATION: 99 % | DIASTOLIC BLOOD PRESSURE: 82 MMHG | BODY MASS INDEX: 36.62 KG/M2 | HEART RATE: 98 BPM

## 2024-10-18 DIAGNOSIS — J01.90 ACUTE BACTERIAL SINUSITIS: ICD-10-CM

## 2024-10-18 DIAGNOSIS — R50.9 ACUTE FEBRILE ILLNESS: ICD-10-CM

## 2024-10-18 DIAGNOSIS — R05.9 COUGH IN ADULT: Primary | ICD-10-CM

## 2024-10-18 DIAGNOSIS — J34.89 SINUS PRESSURE: ICD-10-CM

## 2024-10-18 DIAGNOSIS — R09.81 NASAL CONGESTION: ICD-10-CM

## 2024-10-18 DIAGNOSIS — B96.89 ACUTE BACTERIAL SINUSITIS: ICD-10-CM

## 2024-10-18 DIAGNOSIS — R52 BODY ACHES: ICD-10-CM

## 2024-10-18 DIAGNOSIS — R11.0 NAUSEA: ICD-10-CM

## 2024-10-18 LAB
INFLUENZA A ANTIGEN, POC: NEGATIVE
INFLUENZA B ANTIGEN, POC: NEGATIVE
LOT NUMBER POC: NORMAL
SARS-COV-2 RNA POC - COV: NORMAL
VALID INTERNAL CONTROL, POC: PRESENT
VENDOR AND KIT NAME POC: NORMAL

## 2024-10-18 PROCEDURE — 99214 OFFICE O/P EST MOD 30 MIN: CPT | Performed by: STUDENT IN AN ORGANIZED HEALTH CARE EDUCATION/TRAINING PROGRAM

## 2024-10-18 ASSESSMENT — ENCOUNTER SYMPTOMS
SORE THROAT: 0
COUGH: 1
WHEEZING: 0
DIARRHEA: 1
BACK PAIN: 0
VOMITING: 0
ABDOMINAL PAIN: 0
NAUSEA: 1
SINUS PRESSURE: 1
SINUS PAIN: 1

## 2024-10-18 NOTE — PROGRESS NOTES
HPI Notes    Name: Sabas Madrid  : 2000         Chief Complaint:     Chief Complaint   Patient presents with    URI     Sinus discomfort, jaw pain, diarrhea, cough, nausea, body aches and fever started 7 days ago.        History of Present Illness:        HPI    This is a 24-year-old gentleman presenting for evaluation of sinus discomfort, jaw pain, cough, nausea, body aches, diarrhea and fever with a Tmax of 102 degrees Fahrenheit beginning 7 days ago. Symptoms began with body aches, congestion then progressing to nausea, fever, cough and worsening body aches. He has been taking some ibuprofen and tylenol to help, which did cause his fever to break.     Past Medical History:     Past Medical History:   Diagnosis Date    IBS (irritable bowel syndrome)     Insomnia     Irritable bowel syndrome with diarrhea 2021    Worked up, treated with Xifaxan 2021    Major depressive disorder     Migraine     Panic attacks 2021      Reviewed all health maintenance requirements and ordered appropriate tests  Health Maintenance Due   Topic Date Due    HPV vaccine (1 - Male 3-dose series) Never done    DTaP/Tdap/Td vaccine (7 - Td or Tdap) 2022    Flu vaccine (1) 2024    COVID-19 Vaccine ( - - season) Never done       Past Surgical History:     Past Surgical History:   Procedure Laterality Date    TONSILLECTOMY          Medications:       Prior to Admission medications    Medication Sig Start Date End Date Taking? Authorizing Provider   amoxicillin-clavulanate (AUGMENTIN) 875-125 MG per tablet Take 1 tablet by mouth 2 times daily for 7 days 10/18/24 10/25/24 Yes Joce Pena DO   metoprolol succinate (TOPROL XL) 50 MG extended release tablet Take 1 tablet by mouth daily 24  Yes Joce Pena DO   omeprazole (PRILOSEC) 20 MG delayed release capsule Take 1 capsule by mouth every morning (before breakfast) 24  Yes Joce Pena DO   vitamin D (ERGOCALCIFEROL) 1.25 MG

## 2024-10-18 NOTE — PATIENT INSTRUCTIONS
SURVEY:    You may be receiving a survey from [facility] regarding your visit today.    You may get this in the mail, through your MyChart or in your email.     Please complete the survey to enable us to provide the highest quality of care to you and your family.    If you cannot score us as very good ( 5 Stars) on any question, please feel free to call the office to discuss how we could have made your experience exceptional.     Thank you.    Clinical Care Team:  Dr. Joce Pena, DO Hodan Barker LPN    Triage:  Jemma Salcedo CMA    Clerical Team:  Jemma Harden

## 2024-11-25 DIAGNOSIS — K21.00 GASTROESOPHAGEAL REFLUX DISEASE WITH ESOPHAGITIS WITHOUT HEMORRHAGE: ICD-10-CM

## 2024-11-25 NOTE — TELEPHONE ENCOUNTER
Last OV: 10/18/2024    Next scheduled apt: Visit date not found      Patient requesting refill  Medication pending

## 2025-01-03 DIAGNOSIS — R03.0 ELEVATED BLOOD PRESSURE READING IN OFFICE WITHOUT DIAGNOSIS OF HYPERTENSION: ICD-10-CM

## 2025-01-03 DIAGNOSIS — R42 DIZZINESS: ICD-10-CM

## 2025-01-03 DIAGNOSIS — R00.2 PALPITATIONS: ICD-10-CM

## 2025-01-03 RX ORDER — METOPROLOL SUCCINATE 50 MG/1
50 TABLET, EXTENDED RELEASE ORAL DAILY
Qty: 30 TABLET | Refills: 5 | Status: SHIPPED | OUTPATIENT
Start: 2025-01-03

## 2025-01-22 ENCOUNTER — PATIENT MESSAGE (OUTPATIENT)
Dept: FAMILY MEDICINE CLINIC | Age: 25
End: 2025-01-22

## 2025-01-22 ENCOUNTER — OFFICE VISIT (OUTPATIENT)
Dept: FAMILY MEDICINE CLINIC | Age: 25
End: 2025-01-22
Payer: COMMERCIAL

## 2025-01-22 VITALS
DIASTOLIC BLOOD PRESSURE: 88 MMHG | WEIGHT: 265 LBS | SYSTOLIC BLOOD PRESSURE: 134 MMHG | TEMPERATURE: 98 F | HEART RATE: 82 BPM | OXYGEN SATURATION: 97 % | BODY MASS INDEX: 35.94 KG/M2

## 2025-01-22 DIAGNOSIS — J34.89 SINUS PRESSURE: ICD-10-CM

## 2025-01-22 DIAGNOSIS — J01.40 ACUTE NON-RECURRENT PANSINUSITIS: Primary | ICD-10-CM

## 2025-01-22 DIAGNOSIS — R05.1 ACUTE COUGH: ICD-10-CM

## 2025-01-22 PROCEDURE — 99213 OFFICE O/P EST LOW 20 MIN: CPT | Performed by: FAMILY MEDICINE

## 2025-01-22 RX ORDER — DOXYCYCLINE HYCLATE 100 MG
100 TABLET ORAL 2 TIMES DAILY
Qty: 20 TABLET | Refills: 0 | Status: SHIPPED | OUTPATIENT
Start: 2025-01-22 | End: 2025-02-01

## 2025-01-22 SDOH — ECONOMIC STABILITY: FOOD INSECURITY: WITHIN THE PAST 12 MONTHS, YOU WORRIED THAT YOUR FOOD WOULD RUN OUT BEFORE YOU GOT MONEY TO BUY MORE.: NEVER TRUE

## 2025-01-22 SDOH — ECONOMIC STABILITY: FOOD INSECURITY: WITHIN THE PAST 12 MONTHS, THE FOOD YOU BOUGHT JUST DIDN'T LAST AND YOU DIDN'T HAVE MONEY TO GET MORE.: NEVER TRUE

## 2025-01-22 ASSESSMENT — ENCOUNTER SYMPTOMS
SORE THROAT: 1
NAUSEA: 1
EYE DISCHARGE: 0
RHINORRHEA: 1
COUGH: 1
DIARRHEA: 1
SINUS PAIN: 1
VOMITING: 0

## 2025-01-22 NOTE — PROGRESS NOTES
HPI Notes    Name: Sabas Madrid  : 2000        Chief Complaint:     Chief Complaint   Patient presents with    Cold Symptoms     Patient complains of fever, head congestion, sinus pressure, cough, diarrhea , nausea. Started 3 days ago       History of Present Illness:     Sabas Madrid is a 24 y.o.  male who presents with Cold Symptoms (Patient complains of fever, head congestion, sinus pressure, cough, diarrhea , nausea. Started 3 days ago)      Cold Symptoms   This is a new problem. Episode onset: symptoms started 3d ago. The problem has been gradually worsening. The maximum temperature recorded prior to his arrival was 102 - 102.9 F (pt still had Temp 102 last night). Associated symptoms include coughing, diarrhea, headaches, nausea, rhinorrhea, sinus pain and a sore throat. Pertinent negatives include no ear pain, rash or vomiting. Associated symptoms comments: Pt having congestion and then runny nose.       Past Medical History:     Past Medical History:   Diagnosis Date    IBS (irritable bowel syndrome)     Insomnia     Irritable bowel syndrome with diarrhea 2021    Worked up, treated with Xifaxan 2021    Major depressive disorder     Migraine     Panic attacks 2021      Reviewed all health maintenance requirements and ordered appropriate tests  Health Maintenance Due   Topic Date Due    HPV vaccine (1 - Male 3-dose series) Never done    DTaP/Tdap/Td vaccine (7 - Td or Tdap) 2022    Flu vaccine (1) 2024    COVID-19 Vaccine ( - - season) Never done       Past Surgical History:     Past Surgical History:   Procedure Laterality Date    TONSILLECTOMY          Medications:       Prior to Admission medications    Medication Sig Start Date End Date Taking? Authorizing Provider   doxycycline hyclate (VIBRA-TABS) 100 MG tablet Take 1 tablet by mouth 2 times daily for 10 days 25 Yes Bethany Degroot MD   metoprolol succinate (TOPROL XL) 50 MG extended release

## 2025-02-09 NOTE — PATIENT INSTRUCTIONS
SURVEY:    You may be receiving a survey from [employer] regarding your visit today. Please complete the survey to enable us to provide the highest quality of care to you and your family. If you cannot score us a very good on any question, please call the office to discuss how we could of made your experience a very good one. Thank you.       Clinical Care Team:     Dr. Celso Taylor, URMILA      ClericalTeam:     [id number] [facility]
Statement Selected

## 2025-05-28 DIAGNOSIS — K21.00 GASTROESOPHAGEAL REFLUX DISEASE WITH ESOPHAGITIS WITHOUT HEMORRHAGE: ICD-10-CM

## 2025-05-28 RX ORDER — OMEPRAZOLE 20 MG/1
20 CAPSULE, DELAYED RELEASE ORAL
Qty: 90 CAPSULE | Refills: 1 | Status: SHIPPED | OUTPATIENT
Start: 2025-05-28

## 2025-05-28 NOTE — TELEPHONE ENCOUNTER
Last OV: 1/22/2025   Last RX:    Next scheduled apt: Visit date not found          Pt requesting a refill   Medication pending for approval

## 2025-06-24 ENCOUNTER — OFFICE VISIT (OUTPATIENT)
Dept: FAMILY MEDICINE CLINIC | Age: 25
End: 2025-06-24
Payer: COMMERCIAL

## 2025-06-24 ENCOUNTER — HOSPITAL ENCOUNTER (OUTPATIENT)
Dept: NON INVASIVE DIAGNOSTICS | Age: 25
Discharge: HOME | End: 2025-06-24
Payer: COMMERCIAL

## 2025-06-24 VITALS
DIASTOLIC BLOOD PRESSURE: 80 MMHG | SYSTOLIC BLOOD PRESSURE: 128 MMHG | BODY MASS INDEX: 36.08 KG/M2 | WEIGHT: 266 LBS | OXYGEN SATURATION: 98 % | HEART RATE: 85 BPM

## 2025-06-24 DIAGNOSIS — R07.9 EXERTIONAL CHEST PAIN: ICD-10-CM

## 2025-06-24 DIAGNOSIS — H53.8 BLURRY VISION, RIGHT EYE: ICD-10-CM

## 2025-06-24 DIAGNOSIS — G44.89 OTHER HEADACHE SYNDROME: ICD-10-CM

## 2025-06-24 DIAGNOSIS — G44.89 OTHER HEADACHE SYNDROME: Primary | ICD-10-CM

## 2025-06-24 DIAGNOSIS — R68.89 EXERCISE INTOLERANCE: ICD-10-CM

## 2025-06-24 PROCEDURE — 93005 ELECTROCARDIOGRAM TRACING: CPT

## 2025-06-24 PROCEDURE — 99214 OFFICE O/P EST MOD 30 MIN: CPT | Performed by: STUDENT IN AN ORGANIZED HEALTH CARE EDUCATION/TRAINING PROGRAM

## 2025-06-24 ASSESSMENT — ENCOUNTER SYMPTOMS
COUGH: 0
NAUSEA: 0
ABDOMINAL PAIN: 0
WHEEZING: 0
SORE THROAT: 0
DIARRHEA: 0
VOMITING: 0
SINUS PAIN: 0
BACK PAIN: 0

## 2025-06-24 ASSESSMENT — PATIENT HEALTH QUESTIONNAIRE - PHQ9
SUM OF ALL RESPONSES TO PHQ QUESTIONS 1-9: 3
8. MOVING OR SPEAKING SO SLOWLY THAT OTHER PEOPLE COULD HAVE NOTICED. OR THE OPPOSITE, BEING SO FIGETY OR RESTLESS THAT YOU HAVE BEEN MOVING AROUND A LOT MORE THAN USUAL: NOT AT ALL
SUM OF ALL RESPONSES TO PHQ QUESTIONS 1-9: 3
4. FEELING TIRED OR HAVING LITTLE ENERGY: NOT AT ALL
2. FEELING DOWN, DEPRESSED OR HOPELESS: NOT AT ALL
10. IF YOU CHECKED OFF ANY PROBLEMS, HOW DIFFICULT HAVE THESE PROBLEMS MADE IT FOR YOU TO DO YOUR WORK, TAKE CARE OF THINGS AT HOME, OR GET ALONG WITH OTHER PEOPLE: NOT DIFFICULT AT ALL
1. LITTLE INTEREST OR PLEASURE IN DOING THINGS: NOT AT ALL
SUM OF ALL RESPONSES TO PHQ QUESTIONS 1-9: 3
6. FEELING BAD ABOUT YOURSELF - OR THAT YOU ARE A FAILURE OR HAVE LET YOURSELF OR YOUR FAMILY DOWN: NOT AT ALL
5. POOR APPETITE OR OVEREATING: NEARLY EVERY DAY
3. TROUBLE FALLING OR STAYING ASLEEP: NOT AT ALL
9. THOUGHTS THAT YOU WOULD BE BETTER OFF DEAD, OR OF HURTING YOURSELF: NOT AT ALL
SUM OF ALL RESPONSES TO PHQ QUESTIONS 1-9: 3
7. TROUBLE CONCENTRATING ON THINGS, SUCH AS READING THE NEWSPAPER OR WATCHING TELEVISION: NOT AT ALL

## 2025-06-24 NOTE — PROGRESS NOTES
HPI Notes    Name: Sabas Madrid  : 2000         Chief Complaint:     Chief Complaint   Patient presents with    Chest Pain     Episodes of chest pain, blurry vision and headaches started 3 months ago. Symptoms occur more often after working out.       History of Present Illness:      HPI    This is a 25-year-old man with mild sleep apnea presenting due to complaints of headaches and other symptoms.  He complains of episodes of chest pain, blurry vision and headaches which began around 3 months ago.  Symptoms occur more often after exercising at his local gym.     Symptoms begin with right hemicrania (aching to sharp quality with a Heartbeat sensation), blurry vision of the right eye and aching chest pain in the left aspect of his chest. Symptoms are triggered even by light exercise such as walking his dog and are no more or less intense based on the intensity of his exertion. Symptoms resolve spontaneously with rest after 30 min or so, but the blurry vision may persist for hours.     Past Medical History:     Past Medical History:   Diagnosis Date    IBS (irritable bowel syndrome)     Insomnia     Irritable bowel syndrome with diarrhea 2021    Worked up, treated with Xifaxan 2021    Major depressive disorder     Migraine     Panic attacks 2021      Reviewed all health maintenance requirements and ordered appropriate tests  Health Maintenance Due   Topic Date Due    HPV vaccine (1 - Male 3-dose series) Never done    DTaP/Tdap/Td vaccine (7 - Td or Tdap) 2022    COVID-19 Vaccine ( season) Never done       Past Surgical History:     Past Surgical History:   Procedure Laterality Date    TONSILLECTOMY          Medications:       Prior to Admission medications    Medication Sig Start Date End Date Taking? Authorizing Provider   omeprazole (PRILOSEC) 20 MG delayed release capsule Take 1 capsule by mouth every morning (before breakfast) 25  Yes Joce Pena DO   metoprolol

## 2025-06-24 NOTE — PATIENT INSTRUCTIONS
SURVEY:    You may be receiving a survey from [facility] regarding your visit today.    You may get this in the mail, through your MyChart or in your email.     Please complete the survey to enable us to provide the highest quality of care to you and your family.    If you cannot score us as very good ( 5 Stars) on any question, please feel free to call the office to discuss how we could have made your experience exceptional.     Thank you.    Clinical Care Team:  Dr. Joce Pena, DO Hodan Barker LPN    Triage:  Jemma Salcedo CMA    Clerical Team:  Jemma Harden

## 2025-06-26 ENCOUNTER — PATIENT MESSAGE (OUTPATIENT)
Dept: FAMILY MEDICINE CLINIC | Age: 25
End: 2025-06-26

## 2025-06-26 DIAGNOSIS — R07.9 EXERTIONAL CHEST PAIN: Primary | ICD-10-CM

## 2025-06-26 DIAGNOSIS — R94.31 ABNORMAL ECG: ICD-10-CM

## 2025-06-26 LAB
EKG ATRIAL RATE: 76 BPM
EKG P AXIS: 28 DEGREES
EKG P-R INTERVAL: 136 MS
EKG Q-T INTERVAL: 362 MS
EKG QRS DURATION: 80 MS
EKG QTC CALCULATION (BAZETT): 407 MS
EKG R AXIS: 68 DEGREES
EKG T AXIS: -14 DEGREES
EKG VENTRICULAR RATE: 76 BPM

## 2025-06-26 PROCEDURE — 93010 ELECTROCARDIOGRAM REPORT: CPT | Performed by: INTERNAL MEDICINE

## 2025-06-27 ENCOUNTER — HOSPITAL ENCOUNTER (OUTPATIENT)
Age: 25
Discharge: HOME | End: 2025-06-29
Attending: STUDENT IN AN ORGANIZED HEALTH CARE EDUCATION/TRAINING PROGRAM
Payer: COMMERCIAL

## 2025-06-27 DIAGNOSIS — H53.8 BLURRY VISION, RIGHT EYE: ICD-10-CM

## 2025-06-27 DIAGNOSIS — R68.89 EXERCISE INTOLERANCE: ICD-10-CM

## 2025-06-27 DIAGNOSIS — R07.9 EXERTIONAL CHEST PAIN: ICD-10-CM

## 2025-06-27 DIAGNOSIS — G44.89 OTHER HEADACHE SYNDROME: ICD-10-CM

## 2025-06-27 PROCEDURE — 93242 EXT ECG>48HR<7D RECORDING: CPT

## 2025-06-27 NOTE — NURSING NOTE
The patient was educated on the use of a holter monitor. The patient's comprehension was high. The patient was able to verbalize recall. The patient was instructed on how and when to return the monitor.T

## 2025-07-04 DIAGNOSIS — R00.2 PALPITATIONS: ICD-10-CM

## 2025-07-04 DIAGNOSIS — R42 DIZZINESS: ICD-10-CM

## 2025-07-04 DIAGNOSIS — R03.0 ELEVATED BLOOD PRESSURE READING IN OFFICE WITHOUT DIAGNOSIS OF HYPERTENSION: ICD-10-CM

## 2025-07-07 DIAGNOSIS — R00.2 PALPITATIONS: ICD-10-CM

## 2025-07-07 DIAGNOSIS — R42 DIZZINESS: ICD-10-CM

## 2025-07-07 DIAGNOSIS — R03.0 ELEVATED BLOOD PRESSURE READING IN OFFICE WITHOUT DIAGNOSIS OF HYPERTENSION: ICD-10-CM

## 2025-07-07 RX ORDER — METOPROLOL SUCCINATE 50 MG/1
50 TABLET, EXTENDED RELEASE ORAL DAILY
Qty: 30 TABLET | Refills: 5 | OUTPATIENT
Start: 2025-07-07

## 2025-07-07 RX ORDER — METOPROLOL SUCCINATE 50 MG/1
50 TABLET, EXTENDED RELEASE ORAL DAILY
Qty: 30 TABLET | Refills: 5 | Status: SHIPPED | OUTPATIENT
Start: 2025-07-07

## 2025-07-15 ENCOUNTER — OFFICE VISIT (OUTPATIENT)
Dept: CARDIOLOGY CLINIC | Age: 25
End: 2025-07-15
Payer: COMMERCIAL

## 2025-07-15 VITALS
WEIGHT: 272 LBS | OXYGEN SATURATION: 98 % | SYSTOLIC BLOOD PRESSURE: 155 MMHG | DIASTOLIC BLOOD PRESSURE: 91 MMHG | HEART RATE: 85 BPM | BODY MASS INDEX: 36.89 KG/M2

## 2025-07-15 DIAGNOSIS — I42.9 CARDIOMYOPATHY, UNSPECIFIED TYPE (HCC): ICD-10-CM

## 2025-07-15 DIAGNOSIS — E55.9 VITAMIN D DEFICIENCY: ICD-10-CM

## 2025-07-15 DIAGNOSIS — F32.1 CURRENT MODERATE EPISODE OF MAJOR DEPRESSIVE DISORDER WITHOUT PRIOR EPISODE (HCC): ICD-10-CM

## 2025-07-15 DIAGNOSIS — R00.2 PALPITATIONS: ICD-10-CM

## 2025-07-15 DIAGNOSIS — F41.0 PANIC ATTACKS: ICD-10-CM

## 2025-07-15 DIAGNOSIS — G47.30 MILD SLEEP APNEA: ICD-10-CM

## 2025-07-15 DIAGNOSIS — R07.9 CHEST PAIN, UNSPECIFIED TYPE: Primary | ICD-10-CM

## 2025-07-15 DIAGNOSIS — G43.109 MIGRAINE WITH AURA AND WITHOUT STATUS MIGRAINOSUS, NOT INTRACTABLE: ICD-10-CM

## 2025-07-15 DIAGNOSIS — R03.0 ELEVATED BLOOD PRESSURE READING IN OFFICE WITHOUT DIAGNOSIS OF HYPERTENSION: ICD-10-CM

## 2025-07-15 DIAGNOSIS — F51.02 ADJUSTMENT INSOMNIA: ICD-10-CM

## 2025-07-15 DIAGNOSIS — R94.31 ABNORMAL ELECTROCARDIOGRAPHY: ICD-10-CM

## 2025-07-15 PROCEDURE — 99204 OFFICE O/P NEW MOD 45 MIN: CPT | Performed by: INTERNAL MEDICINE

## 2025-07-15 NOTE — ASSESSMENT & PLAN NOTE
Continue treatment with new amlodipine started to replace prior beta-blocker which seem to exacerbate current symptoms

## 2025-07-15 NOTE — PROGRESS NOTES
Pt in office for new patient visit with Dr Brooks.     Exertional chest pain, blurry vision, dizziness, and headaches  States that chest pain is daily.   Cannot work out without chest pain that radiates to jaw     ~~~~~~~~~~~~~~~~~~~~~~~~~~~~~~~~~~~~~  Cannot work out, walk, mow the law  Chest pain radiates up jaw and causes headaches    Is a mix between sharp and dull    The only thing that relieves pain is sitting down   No stomach issues, just on Prilosec for acid reflux    New blood pressure issues, past year     Used to exercise 3-4 times a week  Weight lifts and treadmill work     Did notice pain was worse on metoprolol    Mother had heart attack at 44, had a stent    No edema  ~~~~~~~~~~~~~~~~~~~~~~~~~~~~~~~~~~~~~~  Do stress echo and ECHO  Have sed rate and c-reactive protein checked (lab drawn)    See in 4-6 weeks  
acute distress.   HEENT: Normocephalic and atraumatic.No JVD present. Carotid bruit is not present. No mass and no thyromegaly present. No lymphadenopathy present.  Cardiovascular: Normal rate, regular rhythm, Exam reveals normal heart sounds. Normal S1 and S2, No S3, No S4. no gallop and no friction rubs. No murmur was heard..  Pulmonary/Chest: Effort normal and breath sounds normal. No respiratory distress. He has no wheezes, rhonchi or rales. Abdominal: Soft, non-tender. Bowel sounds and aorta are normal. He exhibits no organomegaly, mass or bruit.   Extremities: Trace. No cyanosis or clubbing. 2+ radial and carotid pulses. Distal extremity pulses: 2+ bilaterally.  Neurological: He is alert and oriented to person, place, and time. No evidence of gross cranial nerve deficit. Coordination appeared normal.   Skin: Skin is warm and dry. There is no rash or diaphoresis.   Psychiatric: He has a normal mood and affect. His speech is normal and behavior is normal.        No results found for this or any previous visit.      Encounter Date: 06/24/25   EKG 12 lead   Result Value    Ventricular Rate 76    Atrial Rate 76    P-R Interval 136    QRS Duration 80    Q-T Interval 362    QTc Calculation (Bazett) 407    P Axis 28    R Axis 68    T Axis -14    Narrative    Normal sinus rhythm  T wave abnormality, consider inferolateral ischemia  Abnormal ECG            MOST RECENT LABS ON RECORD:   TSH   Date Value Ref Range Status   09/12/2024 1.30 0.30 - 5.00 uIU/mL Final     Lab Results   Component Value Date    WBC 8.8 09/12/2024    HGB 13.9 09/12/2024    HCT 42.6 09/12/2024     09/12/2024    ALT 32 09/12/2024    AST 28 09/12/2024     09/12/2024    K 4.0 09/12/2024     09/12/2024    CREATININE 1.2 09/12/2024    BUN 13 09/12/2024    CO2 26 09/12/2024    TSH 1.30 09/12/2024    LABA1C 5.2 11/04/2021       ASSESSMENT:     Assessment & Plan  Chest pain, unspecified type  -Exertional chest pain with normal

## 2025-07-22 ENCOUNTER — OFFICE VISIT (OUTPATIENT)
Dept: FAMILY MEDICINE CLINIC | Age: 25
End: 2025-07-22
Payer: COMMERCIAL

## 2025-07-22 VITALS
BODY MASS INDEX: 36.62 KG/M2 | SYSTOLIC BLOOD PRESSURE: 136 MMHG | OXYGEN SATURATION: 98 % | HEART RATE: 92 BPM | DIASTOLIC BLOOD PRESSURE: 88 MMHG | WEIGHT: 270 LBS

## 2025-07-22 DIAGNOSIS — R68.89 EXERCISE INTOLERANCE: Primary | ICD-10-CM

## 2025-07-22 DIAGNOSIS — I10 ESSENTIAL HYPERTENSION: ICD-10-CM

## 2025-07-22 PROCEDURE — 99213 OFFICE O/P EST LOW 20 MIN: CPT | Performed by: STUDENT IN AN ORGANIZED HEALTH CARE EDUCATION/TRAINING PROGRAM

## 2025-07-22 PROCEDURE — 3075F SYST BP GE 130 - 139MM HG: CPT | Performed by: STUDENT IN AN ORGANIZED HEALTH CARE EDUCATION/TRAINING PROGRAM

## 2025-07-22 PROCEDURE — 3079F DIAST BP 80-89 MM HG: CPT | Performed by: STUDENT IN AN ORGANIZED HEALTH CARE EDUCATION/TRAINING PROGRAM

## 2025-07-22 ASSESSMENT — ENCOUNTER SYMPTOMS
COUGH: 0
DIARRHEA: 0
SORE THROAT: 0
BACK PAIN: 0
NAUSEA: 0
WHEEZING: 0
ABDOMINAL PAIN: 0
VOMITING: 0
SINUS PAIN: 0

## 2025-07-22 NOTE — PATIENT INSTRUCTIONS
SURVEY:    You may be receiving a survey from [facility] regarding your [facility] today.    You may get this in the mail, through your MyChart or in your email.     Please complete the survey to enable us to provide the highest quality of care to you and your family.    If you cannot score us as very good ( 5 Stars) on any question, please feel free to call the office to discuss how we could have made your experience exceptional.     Thank you.    Clinical Care Team:  Dr. Joce Pena, DO Hodan Barker LPN    Triage:  Jemma Salcedo CMA    Clerical Team:  Jemma Harden

## 2025-07-22 NOTE — PROGRESS NOTES
HPI Notes    Name: Sabas Madrid  : 2000         Chief Complaint:     Chief Complaint   Patient presents with    Hypertension     1 month follow up. Patient initially experienced chest pain and headaches when he started taking Amlodipine. Symptoms have since improved.        History of Present Illness:        HPI    This is a 25-year-old man presenting for reevaluation of hypertension and suspected exercise intolerance.  I had switched him off of his beta-blocker to amlodipine as his antihypertensive about a week ago.  He reports initially he was experiencing some chest pain and headaches but these symptoms have since resolved.  He is normotensive on his current dose of amlodipine, which he has been taking for a week. He has not worked out yet, but has been on a few walks with his dog. Previously this did cause exertional chest pain however recently during his walks he has noted no headaches or blurry vision and minimal exertional CP.     Past Medical History:     Past Medical History:   Diagnosis Date    IBS (irritable bowel syndrome)     Insomnia     Irritable bowel syndrome with diarrhea 2021    Worked up, treated with Xifaxan 2021    Major depressive disorder     Migraine     Panic attacks 2021      Reviewed all health maintenance requirements and ordered appropriate tests  Health Maintenance Due   Topic Date Due    HPV vaccine (1 - Male 3-dose series) Never done    Pneumococcal 0-49 years Vaccine (1 of 2 - PCV) 2019    DTaP/Tdap/Td vaccine (7 - Td or Tdap) 2022    COVID-19 Vaccine ( - - season) Never done       Past Surgical History:     Past Surgical History:   Procedure Laterality Date    TONSILLECTOMY          Medications:       Prior to Admission medications    Medication Sig Start Date End Date Taking? Authorizing Provider   amLODIPine (NORVASC) 5 MG tablet Take 1 tablet by mouth daily 7/14/25 1/10/26 Yes Joce Pena DO   omeprazole (PRILOSEC) 20 MG delayed

## 2025-07-29 ENCOUNTER — HOSPITAL ENCOUNTER (OUTPATIENT)
Age: 25
Discharge: HOME | End: 2025-07-31
Attending: INTERNAL MEDICINE
Payer: COMMERCIAL

## 2025-07-29 ENCOUNTER — HOSPITAL ENCOUNTER (OUTPATIENT)
Age: 25
Discharge: HOME | End: 2025-07-29
Attending: INTERNAL MEDICINE
Payer: COMMERCIAL

## 2025-07-29 VITALS
HEIGHT: 72 IN | HEART RATE: 84 BPM | SYSTOLIC BLOOD PRESSURE: 143 MMHG | WEIGHT: 272 LBS | DIASTOLIC BLOOD PRESSURE: 84 MMHG | BODY MASS INDEX: 36.84 KG/M2

## 2025-07-29 VITALS — HEIGHT: 72 IN | BODY MASS INDEX: 36.84 KG/M2 | WEIGHT: 272 LBS

## 2025-07-29 DIAGNOSIS — I42.9 CARDIOMYOPATHY, UNSPECIFIED TYPE (HCC): ICD-10-CM

## 2025-07-29 DIAGNOSIS — R94.31 ABNORMAL ELECTROCARDIOGRAPHY: ICD-10-CM

## 2025-07-29 DIAGNOSIS — G47.30 MILD SLEEP APNEA: ICD-10-CM

## 2025-07-29 DIAGNOSIS — G43.109 MIGRAINE WITH AURA AND WITHOUT STATUS MIGRAINOSUS, NOT INTRACTABLE: ICD-10-CM

## 2025-07-29 DIAGNOSIS — F32.1 CURRENT MODERATE EPISODE OF MAJOR DEPRESSIVE DISORDER WITHOUT PRIOR EPISODE (HCC): ICD-10-CM

## 2025-07-29 DIAGNOSIS — F51.02 ADJUSTMENT INSOMNIA: ICD-10-CM

## 2025-07-29 DIAGNOSIS — R07.9 CHEST PAIN, UNSPECIFIED TYPE: ICD-10-CM

## 2025-07-29 DIAGNOSIS — R03.0 ELEVATED BLOOD PRESSURE READING IN OFFICE WITHOUT DIAGNOSIS OF HYPERTENSION: ICD-10-CM

## 2025-07-29 DIAGNOSIS — F41.0 PANIC ATTACKS: ICD-10-CM

## 2025-07-29 DIAGNOSIS — E55.9 VITAMIN D DEFICIENCY: ICD-10-CM

## 2025-07-29 LAB
CHOLEST SERPL-MCNC: 155 MG/DL (ref 0–199)
CHOLESTEROL/HDL RATIO: 6.5
CRP SERPL HS-MCNC: 19.7 MG/L (ref 0–5)
ERYTHROCYTE [SEDIMENTATION RATE] IN BLOOD BY PHOTOMETRIC METHOD: 14 MM/HR (ref 0–15)
HDLC SERPL-MCNC: 24 MG/DL
LDLC SERPL CALC-MCNC: 91 MG/DL (ref 0–100)
TRIGL SERPL-MCNC: 200 MG/DL
VLDLC SERPL CALC-MCNC: 40 MG/DL (ref 1–30)

## 2025-07-29 PROCEDURE — 93017 CV STRESS TEST TRACING ONLY: CPT

## 2025-07-29 PROCEDURE — C8929 TTE W OR WO FOL WCON,DOPPLER: HCPCS

## 2025-07-29 PROCEDURE — 36415 COLL VENOUS BLD VENIPUNCTURE: CPT

## 2025-07-29 PROCEDURE — 6360000004 HC RX CONTRAST MEDICATION: Performed by: INTERNAL MEDICINE

## 2025-07-29 PROCEDURE — 85652 RBC SED RATE AUTOMATED: CPT

## 2025-07-29 PROCEDURE — 86140 C-REACTIVE PROTEIN: CPT

## 2025-07-29 PROCEDURE — 80061 LIPID PANEL: CPT

## 2025-07-29 RX ADMIN — SULFUR HEXAFLUORIDE 5 ML: KIT at 09:20

## 2025-07-29 NOTE — NURSING NOTE
0910 TTE completed and transition to stress echo begun. / procedure explained and consents signed w/ pt voicing understanding.  0915 PIV attempted but unattainable.  LUPE Oliveira uses ultrasound to insert 22g PIV and  is successful. / Documented in chart.  0919 Resting images done w/ use of Lumason enhancer. Stress echo begun.  BL  & /83.  0934 Pt supercedes minimal THR and achieves 190 bpm and 157/57 peak BP.  Accelerated Milo TM protocol used reaching Stage 4 at 05:50 into the test.  2nd dose of Lumason injected per protocol and TM immediately stopped and transferred to echo bed w/in approx 15-20 seconds.  0935 Recovery begun.  Pt denies cp or other adverse s/s throughout test EXCEPT mild dizziness following abrupt move to bed.  0941 Pt returns vs to BL at 0941.  Denies symptoms.  Final  and pt 147/71.  D/C prep begins.    0946 PIV removed and hemostasis achieve after sterile bandage applied.  IV sites looks normal.  (See EPIC documentation.  0947 EKG findings presented to Dr. Brooks w/ final review of stress images and EKG to be sent to him for final review.  0950 PT transferred D/C to home w/o difficulty or complaint.

## 2025-07-30 LAB
ECHO AO ASC DIAM: 3.4 CM
ECHO AO ASCENDING AORTA INDEX: 1.4 CM/M2
ECHO AO ROOT DIAM: 3.6 CM
ECHO AO ROOT INDEX: 1.48 CM/M2
ECHO AV AREA PEAK VELOCITY: 4.1 CM2
ECHO AV AREA VTI: 3.7 CM2
ECHO AV AREA/BSA PEAK VELOCITY: 1.7 CM2/M2
ECHO AV AREA/BSA VTI: 1.5 CM2/M2
ECHO AV MEAN GRADIENT: 3 MMHG
ECHO AV MEAN VELOCITY: 0.9 M/S
ECHO AV PEAK GRADIENT: 6 MMHG
ECHO AV PEAK VELOCITY: 1.2 M/S
ECHO AV VELOCITY RATIO: 0.92
ECHO BSA: 2.5 M2
ECHO BSA: 2.5 M2
ECHO EST RA PRESSURE: 3 MMHG
ECHO LA DIAMETER INDEX: 1.85 CM/M2
ECHO LA DIAMETER: 4.5 CM
ECHO LA TO AORTIC ROOT RATIO: 1.25
ECHO LA VOL A-L A2C: 51 ML (ref 18–58)
ECHO LA VOL A-L A4C: 67 ML (ref 18–58)
ECHO LA VOL BP: 58 ML (ref 18–58)
ECHO LA VOL MOD A2C: 48 ML (ref 18–58)
ECHO LA VOL MOD A4C: 64 ML (ref 18–58)
ECHO LA VOL/BSA BIPLANE: 24 ML/M2 (ref 16–34)
ECHO LA VOLUME AREA LENGTH: 62 ML
ECHO LA VOLUME INDEX A-L A2C: 21 ML/M2 (ref 16–34)
ECHO LA VOLUME INDEX A-L A4C: 28 ML/M2 (ref 16–34)
ECHO LA VOLUME INDEX AREA LENGTH: 26 ML/M2 (ref 16–34)
ECHO LA VOLUME INDEX MOD A2C: 20 ML/M2 (ref 16–34)
ECHO LA VOLUME INDEX MOD A4C: 26 ML/M2 (ref 16–34)
ECHO LV E' LATERAL VELOCITY: 16 CM/S
ECHO LV E' SEPTAL VELOCITY: 12 CM/S
ECHO LV EDV A2C: 114 ML
ECHO LV EDV A4C: 121 ML
ECHO LV EDV BP: 120 ML (ref 67–155)
ECHO LV EDV INDEX A4C: 50 ML/M2
ECHO LV EDV INDEX BP: 49 ML/M2
ECHO LV EDV NDEX A2C: 47 ML/M2
ECHO LV EF PHYSICIAN: 60 %
ECHO LV EF PHYSICIAN: 62 %
ECHO LV EJECTION FRACTION BIPLANE: 62 % (ref 55–100)
ECHO LV ESV A2C: 45 ML
ECHO LV ESV A4C: 42 ML
ECHO LV ESV BP: 46 ML (ref 22–58)
ECHO LV ESV INDEX A2C: 19 ML/M2
ECHO LV ESV INDEX A4C: 17 ML/M2
ECHO LV ESV INDEX BP: 19 ML/M2
ECHO LV FRACTIONAL SHORTENING: 32 % (ref 28–44)
ECHO LV INTERNAL DIMENSION DIASTOLE INDEX: 2.06 CM/M2
ECHO LV INTERNAL DIMENSION DIASTOLIC: 5 CM (ref 4.2–5.9)
ECHO LV INTERNAL DIMENSION SYSTOLIC INDEX: 1.4 CM/M2
ECHO LV INTERNAL DIMENSION SYSTOLIC: 3.4 CM
ECHO LV IVSD: 0.7 CM (ref 0.6–1)
ECHO LV MASS 2D: 114.7 G (ref 88–224)
ECHO LV MASS INDEX 2D: 47.2 G/M2 (ref 49–115)
ECHO LV POSTERIOR WALL DIASTOLIC: 0.7 CM (ref 0.6–1)
ECHO LV RELATIVE WALL THICKNESS RATIO: 0.28
ECHO LVOT AREA: 4.2 CM2
ECHO LVOT DIAM: 2.3 CM
ECHO LVOT MEAN GRADIENT: 3 MMHG
ECHO LVOT PEAK GRADIENT: 5 MMHG
ECHO LVOT PEAK VELOCITY: 1.1 M/S
ECHO LVOT STROKE VOLUME INDEX: 34 ML/M2
ECHO LVOT SV: 82.6 ML
ECHO LVOT VTI: 19.9 CM
ECHO MV A VELOCITY: 0.56 M/S
ECHO MV E DECELERATION TIME (DT): 122 MS
ECHO MV E VELOCITY: 0.74 M/S
ECHO MV E/A RATIO: 1.32
ECHO MV E/E' LATERAL: 4.63
ECHO MV E/E' RATIO (AVERAGED): 5.4
ECHO MV E/E' SEPTAL: 6.17
ECHO RA AREA 4C: 17.3 CM2
ECHO RA END SYSTOLIC VOLUME APICAL 4 CHAMBER INDEX BSA: 18 ML/M2
ECHO RA VOLUME: 44 ML
ECHO RV BASAL DIMENSION: 3.6 CM
ECHO RV MID DIMENSION: 3.7 CM
ECHO RV TAPSE: 2.4 CM (ref 1.7–?)
STRESS BASELINE DIAS BP: 84 MMHG
STRESS BASELINE HR: 97 BPM
STRESS BASELINE SYS BP: 143 MMHG
STRESS ESTIMATED WORKLOAD: 9.1 METS
STRESS EXERCISE DUR MIN: 5 MIN
STRESS EXERCISE DUR SEC: 55 SEC
STRESS PEAK DIAS BP: 64 MMHG
STRESS PEAK SYS BP: 174 MMHG
STRESS PERCENT HR ACHIEVED: 99 %
STRESS POST PEAK HR: 193 BPM
STRESS RATE PRESSURE PRODUCT: NORMAL BPM*MMHG
STRESS TARGET HR: 195 BPM

## 2025-07-30 PROCEDURE — 93306 TTE W/DOPPLER COMPLETE: CPT | Performed by: INTERNAL MEDICINE

## 2025-08-05 ENCOUNTER — PATIENT MESSAGE (OUTPATIENT)
Dept: FAMILY MEDICINE CLINIC | Age: 25
End: 2025-08-05

## 2025-08-13 ENCOUNTER — TELEMEDICINE (OUTPATIENT)
Dept: FAMILY MEDICINE CLINIC | Age: 25
End: 2025-08-13
Payer: COMMERCIAL

## 2025-08-13 ENCOUNTER — TELEPHONE (OUTPATIENT)
Dept: PHARMACY | Facility: CLINIC | Age: 25
End: 2025-08-13

## 2025-08-13 DIAGNOSIS — R79.82 ELEVATED C-REACTIVE PROTEIN (CRP): ICD-10-CM

## 2025-08-13 DIAGNOSIS — R68.89 EXERCISE INTOLERANCE: Primary | ICD-10-CM

## 2025-08-13 DIAGNOSIS — I10 ESSENTIAL HYPERTENSION: ICD-10-CM

## 2025-08-13 PROCEDURE — 99213 OFFICE O/P EST LOW 20 MIN: CPT | Performed by: STUDENT IN AN ORGANIZED HEALTH CARE EDUCATION/TRAINING PROGRAM

## 2025-08-13 ASSESSMENT — ENCOUNTER SYMPTOMS
ABDOMINAL PAIN: 0
WHEEZING: 0
DIARRHEA: 0
COUGH: 0
SORE THROAT: 0
SINUS PAIN: 0
NAUSEA: 0
BACK PAIN: 0
VOMITING: 0

## 2025-08-25 ENCOUNTER — OFFICE VISIT (OUTPATIENT)
Dept: CARDIOLOGY CLINIC | Age: 25
End: 2025-08-25
Payer: COMMERCIAL

## 2025-08-25 VITALS
BODY MASS INDEX: 36.08 KG/M2 | DIASTOLIC BLOOD PRESSURE: 97 MMHG | HEART RATE: 93 BPM | OXYGEN SATURATION: 98 % | WEIGHT: 266 LBS | SYSTOLIC BLOOD PRESSURE: 146 MMHG

## 2025-08-25 DIAGNOSIS — G43.109 MIGRAINE WITH AURA AND WITHOUT STATUS MIGRAINOSUS, NOT INTRACTABLE: ICD-10-CM

## 2025-08-25 DIAGNOSIS — F32.1 CURRENT MODERATE EPISODE OF MAJOR DEPRESSIVE DISORDER WITHOUT PRIOR EPISODE (HCC): ICD-10-CM

## 2025-08-25 DIAGNOSIS — E55.9 VITAMIN D DEFICIENCY: ICD-10-CM

## 2025-08-25 DIAGNOSIS — E78.5 HYPERLIPIDEMIA, UNSPECIFIED HYPERLIPIDEMIA TYPE: Primary | ICD-10-CM

## 2025-08-25 DIAGNOSIS — I10 ESSENTIAL HYPERTENSION: ICD-10-CM

## 2025-08-25 DIAGNOSIS — F41.0 PANIC ATTACKS: ICD-10-CM

## 2025-08-25 DIAGNOSIS — R07.89 CHEST PAIN, ATYPICAL: ICD-10-CM

## 2025-08-25 DIAGNOSIS — G47.30 MILD SLEEP APNEA: ICD-10-CM

## 2025-08-25 PROCEDURE — 99213 OFFICE O/P EST LOW 20 MIN: CPT | Performed by: INTERNAL MEDICINE

## 2025-08-25 PROCEDURE — 3080F DIAST BP >= 90 MM HG: CPT | Performed by: INTERNAL MEDICINE

## 2025-08-25 PROCEDURE — 3077F SYST BP >= 140 MM HG: CPT | Performed by: INTERNAL MEDICINE

## 2025-08-25 RX ORDER — ROSUVASTATIN CALCIUM 5 MG/1
5 TABLET, COATED ORAL DAILY
COMMUNITY
End: 2025-08-25 | Stop reason: SDUPTHER

## 2025-08-25 RX ORDER — ROSUVASTATIN CALCIUM 5 MG/1
5 TABLET, COATED ORAL DAILY
Qty: 30 TABLET | Refills: 11 | Status: SHIPPED | OUTPATIENT
Start: 2025-08-25